# Patient Record
Sex: FEMALE | Race: BLACK OR AFRICAN AMERICAN | NOT HISPANIC OR LATINO | Employment: UNEMPLOYED | URBAN - METROPOLITAN AREA
[De-identification: names, ages, dates, MRNs, and addresses within clinical notes are randomized per-mention and may not be internally consistent; named-entity substitution may affect disease eponyms.]

---

## 2023-03-23 LAB
ALBUMIN (MG/L) IN URINE: 13.1 MG/L
ALBUMIN/CREATININE (UG/MG) IN URINE: 14.9 UG/MG CRT (ref 0–30)
CHOLESTEROL (MG/DL) IN SER/PLAS: 135 MG/DL (ref 0–199)
CHOLESTEROL IN HDL (MG/DL) IN SER/PLAS: 49.8 MG/DL
CHOLESTEROL/HDL RATIO: 2.7
CREATININE (MG/DL) IN URINE: 88.2 MG/DL (ref 20–320)
LDL: 61 MG/DL (ref 0–99)
TRIGLYCERIDE (MG/DL) IN SER/PLAS: 123 MG/DL (ref 0–149)
VLDL: 25 MG/DL (ref 0–40)

## 2023-04-11 ENCOUNTER — TELEPHONE (OUTPATIENT)
Dept: PRIMARY CARE | Facility: CLINIC | Age: 73
End: 2023-04-11
Payer: MEDICARE

## 2023-04-11 NOTE — TELEPHONE ENCOUNTER
Dr. Capps Pt. Called  to see if she can take a certain medication from her dermatologist  and for her heart monitor results. Please call Pt. Back at [phone].

## 2023-04-12 ENCOUNTER — TELEPHONE (OUTPATIENT)
Dept: PRIMARY CARE | Facility: CLINIC | Age: 73
End: 2023-04-12
Payer: MEDICARE

## 2023-04-12 NOTE — TELEPHONE ENCOUNTER
Miss Martinez would like to speak with you about something medical.    Thank you    At her Mobile Phone

## 2023-04-12 NOTE — TELEPHONE ENCOUNTER
Patient asked me about minoxidil but I warned patient about potential side effects if taking orally

## 2023-04-13 LAB
ALANINE AMINOTRANSFERASE (SGPT) (U/L) IN SER/PLAS: 18 U/L (ref 7–45)
ALBUMIN (G/DL) IN SER/PLAS: 4 G/DL (ref 3.4–5)
ALKALINE PHOSPHATASE (U/L) IN SER/PLAS: 63 U/L (ref 33–136)
ASPARTATE AMINOTRANSFERASE (SGOT) (U/L) IN SER/PLAS: 19 U/L (ref 9–39)
BILIRUBIN DIRECT (MG/DL) IN SER/PLAS: 0.1 MG/DL (ref 0–0.3)
BILIRUBIN TOTAL (MG/DL) IN SER/PLAS: 0.3 MG/DL (ref 0–1.2)
CHOLESTEROL (MG/DL) IN SER/PLAS: 132 MG/DL (ref 0–199)
CHOLESTEROL IN HDL (MG/DL) IN SER/PLAS: 54.1 MG/DL
CHOLESTEROL/HDL RATIO: 2.4
ESTIMATED AVERAGE GLUCOSE FOR HBA1C: 131 MG/DL
HEMOGLOBIN A1C/HEMOGLOBIN TOTAL IN BLOOD: 6.2 %
LDL: 59 MG/DL (ref 0–99)
PROTEIN TOTAL: 6.4 G/DL (ref 6.4–8.2)
TRIGLYCERIDE (MG/DL) IN SER/PLAS: 93 MG/DL (ref 0–149)
VLDL: 19 MG/DL (ref 0–40)

## 2023-04-17 NOTE — TELEPHONE ENCOUNTER
Dr. Capps Pt. Called inquiring do she still need to take the ECG recording because she already have a heart monitor. Pt. Would like for you to call her.

## 2023-04-20 ENCOUNTER — TELEPHONE (OUTPATIENT)
Dept: PRIMARY CARE | Facility: CLINIC | Age: 73
End: 2023-04-20
Payer: MEDICARE

## 2023-04-20 NOTE — TELEPHONE ENCOUNTER
I notified Pt.  Carolina Martinez of Dr. Capps message. Pt. expressed understanding of the message given

## 2023-04-20 NOTE — TELEPHONE ENCOUNTER
----- Message from Ashtyn Capps MD sent at 4/13/2023  9:10 PM EDT -----  Diabetes under good control

## 2023-06-17 DIAGNOSIS — I10 HYPERTENSION, UNSPECIFIED TYPE: ICD-10-CM

## 2023-06-19 RX ORDER — ROSUVASTATIN CALCIUM 20 MG/1
TABLET, COATED ORAL
Qty: 90 TABLET | Refills: 1 | Status: SHIPPED | OUTPATIENT
Start: 2023-06-19 | End: 2023-12-17

## 2023-06-19 RX ORDER — LOSARTAN POTASSIUM 25 MG/1
TABLET ORAL
Qty: 30 TABLET | Refills: 1 | Status: SHIPPED | OUTPATIENT
Start: 2023-06-19 | End: 2023-09-15

## 2023-06-27 ENCOUNTER — TELEPHONE (OUTPATIENT)
Dept: PRIMARY CARE | Facility: CLINIC | Age: 73
End: 2023-06-27
Payer: MEDICARE

## 2023-06-27 NOTE — TELEPHONE ENCOUNTER
Dr. Capps Pt. Called saying that she would like to speak with you to be referred to ENT ASAP having problems with throat call her at [phone].

## 2023-06-28 ENCOUNTER — OFFICE VISIT (OUTPATIENT)
Dept: PRIMARY CARE | Facility: CLINIC | Age: 73
End: 2023-06-28
Payer: MEDICARE

## 2023-06-28 VITALS
OXYGEN SATURATION: 97 % | RESPIRATION RATE: 19 BRPM | DIASTOLIC BLOOD PRESSURE: 76 MMHG | SYSTOLIC BLOOD PRESSURE: 115 MMHG | TEMPERATURE: 96.6 F | BODY MASS INDEX: 39.07 KG/M2 | HEART RATE: 85 BPM | WEIGHT: 199 LBS | HEIGHT: 60 IN

## 2023-06-28 DIAGNOSIS — J30.9 ALLERGIC RHINITIS, UNSPECIFIED SEASONALITY, UNSPECIFIED TRIGGER: ICD-10-CM

## 2023-06-28 DIAGNOSIS — K21.9 GASTROESOPHAGEAL REFLUX DISEASE, UNSPECIFIED WHETHER ESOPHAGITIS PRESENT: ICD-10-CM

## 2023-06-28 DIAGNOSIS — I10 HYPERTENSION, UNSPECIFIED TYPE: ICD-10-CM

## 2023-06-28 DIAGNOSIS — J02.9 SORE THROAT: Primary | ICD-10-CM

## 2023-06-28 DIAGNOSIS — E11.9 TYPE 2 DIABETES MELLITUS WITHOUT COMPLICATION, WITHOUT LONG-TERM CURRENT USE OF INSULIN (MULTI): ICD-10-CM

## 2023-06-28 DIAGNOSIS — E01.0 THYROMEGALY: ICD-10-CM

## 2023-06-28 LAB — POC RAPID STREP: NEGATIVE

## 2023-06-28 PROCEDURE — 1036F TOBACCO NON-USER: CPT | Performed by: PEDIATRICS

## 2023-06-28 PROCEDURE — 3074F SYST BP LT 130 MM HG: CPT | Performed by: PEDIATRICS

## 2023-06-28 PROCEDURE — 87880 STREP A ASSAY W/OPTIC: CPT | Performed by: PEDIATRICS

## 2023-06-28 PROCEDURE — 87635 SARS-COV-2 COVID-19 AMP PRB: CPT

## 2023-06-28 PROCEDURE — 87081 CULTURE SCREEN ONLY: CPT

## 2023-06-28 PROCEDURE — 3044F HG A1C LEVEL LT 7.0%: CPT | Performed by: PEDIATRICS

## 2023-06-28 PROCEDURE — 3078F DIAST BP <80 MM HG: CPT | Performed by: PEDIATRICS

## 2023-06-28 PROCEDURE — 99214 OFFICE O/P EST MOD 30 MIN: CPT | Performed by: PEDIATRICS

## 2023-06-28 PROCEDURE — 1159F MED LIST DOCD IN RCRD: CPT | Performed by: PEDIATRICS

## 2023-06-28 PROCEDURE — 4010F ACE/ARB THERAPY RXD/TAKEN: CPT | Performed by: PEDIATRICS

## 2023-06-28 RX ORDER — HYDROCHLOROTHIAZIDE 25 MG/1
1 TABLET ORAL DAILY
COMMUNITY
Start: 2021-04-07 | End: 2023-06-28 | Stop reason: SDUPTHER

## 2023-06-28 RX ORDER — POTASSIUM CHLORIDE 750 MG/1
TABLET, FILM COATED, EXTENDED RELEASE ORAL
COMMUNITY
Start: 2018-01-23 | End: 2023-06-28 | Stop reason: SDUPTHER

## 2023-06-28 RX ORDER — METOPROLOL TARTRATE 25 MG/1
1 TABLET, FILM COATED ORAL 2 TIMES DAILY
COMMUNITY
Start: 2014-09-23 | End: 2023-08-13

## 2023-06-28 RX ORDER — FLUTICASONE PROPIONATE 50 MCG
SPRAY, SUSPENSION (ML) NASAL
COMMUNITY
Start: 2023-01-12 | End: 2023-06-28 | Stop reason: SDUPTHER

## 2023-06-28 RX ORDER — TRIAMCINOLONE ACETONIDE 1 MG/G
OINTMENT TOPICAL
COMMUNITY
Start: 2023-06-06 | End: 2023-11-29 | Stop reason: WASHOUT

## 2023-06-28 RX ORDER — POTASSIUM CHLORIDE 750 MG/1
TABLET, FILM COATED, EXTENDED RELEASE ORAL
Qty: 90 TABLET | Refills: 1 | Status: SHIPPED | OUTPATIENT
Start: 2023-06-28

## 2023-06-28 RX ORDER — SEMAGLUTIDE 1.34 MG/ML
1 INJECTION, SOLUTION SUBCUTANEOUS WEEKLY
COMMUNITY
Start: 2022-12-02 | End: 2023-08-21 | Stop reason: ALTCHOICE

## 2023-06-28 RX ORDER — FLUTICASONE PROPIONATE 50 MCG
2 SPRAY, SUSPENSION (ML) NASAL DAILY
Qty: 16 G | Refills: 2 | Status: SHIPPED | OUTPATIENT
Start: 2023-06-28

## 2023-06-28 RX ORDER — BUPROPION HYDROCHLORIDE 150 MG/1
1 TABLET, EXTENDED RELEASE ORAL 2 TIMES DAILY
COMMUNITY
Start: 2023-01-12

## 2023-06-28 RX ORDER — OMEPRAZOLE 40 MG/1
40 CAPSULE, DELAYED RELEASE ORAL DAILY
Qty: 90 CAPSULE | Refills: 1 | Status: SHIPPED | OUTPATIENT
Start: 2023-06-28 | End: 2024-02-01

## 2023-06-28 RX ORDER — BLOOD SUGAR DIAGNOSTIC
STRIP MISCELLANEOUS
COMMUNITY
Start: 2021-01-04 | End: 2023-06-28 | Stop reason: SDUPTHER

## 2023-06-28 RX ORDER — ACETAMINOPHEN AND PHENYLEPHRINE HCL 325; 5 MG/1; MG/1
1 TABLET ORAL DAILY
COMMUNITY
Start: 2022-02-13 | End: 2024-04-23 | Stop reason: SDUPTHER

## 2023-06-28 RX ORDER — ALBUTEROL SULFATE 90 UG/1
2 AEROSOL, METERED RESPIRATORY (INHALATION)
COMMUNITY
Start: 2014-09-23 | End: 2023-11-14

## 2023-06-28 RX ORDER — SEMAGLUTIDE 2.68 MG/ML
2 INJECTION, SOLUTION SUBCUTANEOUS
COMMUNITY
Start: 2022-12-02

## 2023-06-28 RX ORDER — OMEPRAZOLE 40 MG/1
1 CAPSULE, DELAYED RELEASE ORAL DAILY
COMMUNITY
Start: 2022-07-11 | End: 2023-06-28 | Stop reason: SDUPTHER

## 2023-06-28 RX ORDER — LANCETS 33 GAUGE
EACH MISCELLANEOUS
COMMUNITY
Start: 2021-01-04

## 2023-06-28 RX ORDER — METFORMIN HYDROCHLORIDE 500 MG/1
1000 TABLET, EXTENDED RELEASE ORAL
COMMUNITY
Start: 2022-03-09 | End: 2023-11-15

## 2023-06-28 RX ORDER — NYSTATIN 100000 U/G
CREAM TOPICAL
COMMUNITY
Start: 2020-10-06 | End: 2023-08-21 | Stop reason: ALTCHOICE

## 2023-06-28 RX ORDER — HYDROCHLOROTHIAZIDE 25 MG/1
25 TABLET ORAL DAILY
Qty: 90 TABLET | Refills: 1 | Status: SHIPPED | OUTPATIENT
Start: 2023-06-28

## 2023-06-28 RX ORDER — TIRZEPATIDE 5 MG/.5ML
INJECTION, SOLUTION SUBCUTANEOUS
COMMUNITY
Start: 2023-05-24 | End: 2023-08-21 | Stop reason: ALTCHOICE

## 2023-06-28 RX ORDER — BLOOD SUGAR DIAGNOSTIC
STRIP MISCELLANEOUS
Qty: 100 STRIP | Refills: 2 | Status: SHIPPED | OUTPATIENT
Start: 2023-06-28

## 2023-06-28 RX ORDER — ASPIRIN 81 MG/1
1 TABLET ORAL DAILY
COMMUNITY
Start: 2021-01-04

## 2023-06-28 ASSESSMENT — PATIENT HEALTH QUESTIONNAIRE - PHQ9
SUM OF ALL RESPONSES TO PHQ9 QUESTIONS 1 AND 2: 0
2. FEELING DOWN, DEPRESSED OR HOPELESS: NOT AT ALL
1. LITTLE INTEREST OR PLEASURE IN DOING THINGS: NOT AT ALL

## 2023-06-28 ASSESSMENT — PAIN SCALES - GENERAL: PAINLEVEL: 8

## 2023-06-28 NOTE — PROGRESS NOTES
Subjective   Patient ID: Carolina Martinez is a 72 y.o. female who presents for Sore Throat and Earache.    HPI   Patient developed burning sore throat angela when laying down for 2 weeks; Has a feeling of lump in the throat which she can't get rid of; had bad cold and cough last week which improved. Has not tried mucinex; Clear phlegm.  Review of Systems    Objective   /76 (BP Location: Left arm, Patient Position: Sitting, BP Cuff Size: Large adult)   Pulse 85   Temp 35.9 °C (96.6 °F) (Temporal)   Resp 19   Ht 1.524 m (5')   Wt 90.3 kg (199 lb)   LMP  (LMP Unknown)   SpO2 97%   BMI 38.86 kg/m²     Physical Exam  Throat is red; Tms normal; thyroid seems to be a bit tender  Lungs clear  Heart reg    Assessment/Plan   Problem List Items Addressed This Visit       Sore throat - Primary    Relevant Orders    Sars-CoV-2 PCR, Symptomatic    POCT rapid strep A manually resulted    Group A Streptococcus, Culture    US thyroid    Allergic rhinitis    Relevant Medications    fluticasone (Flonase) 50 mcg/actuation nasal spray    GERD (gastroesophageal reflux disease)    Relevant Medications    omeprazole (PriLOSEC) 40 mg DR capsule    Thyromegaly    Relevant Orders    US thyroid     Other Visit Diagnoses       Hypertension, unspecified type        Relevant Medications    hydroCHLOROthiazide (HYDRODiuril) 25 mg tablet    potassium chloride CR 10 mEq ER tablet    Type 2 diabetes mellitus without complication, without long-term current use of insulin (CMS/[facility])        Relevant Medications    OneTouch Ultra Test strip

## 2023-06-28 NOTE — PATIENT INSTRUCTIONS
Take flonase, mucinex and for one week take omeprazole twice a day  Return in August as scheduled.

## 2023-06-29 LAB — SARS-COV-2 RESULT: NOT DETECTED

## 2023-06-30 PROBLEM — E11.9 DIABETES TYPE 2, CONTROLLED (MULTI): Status: ACTIVE | Noted: 2023-06-30

## 2023-06-30 PROBLEM — G47.33 OSA ON CPAP: Status: ACTIVE | Noted: 2023-06-30

## 2023-06-30 PROBLEM — R19.8 ABNORMAL FINDINGS ON ESOPHAGOGASTRODUODENOSCOPY (EGD): Status: ACTIVE | Noted: 2023-06-30

## 2023-06-30 PROBLEM — R91.8 MULTIPLE LUNG NODULES ON CT: Status: ACTIVE | Noted: 2023-06-30

## 2023-06-30 PROBLEM — G57.93 NEUROPATHIC PAIN OF BOTH FEET: Status: ACTIVE | Noted: 2023-06-30

## 2023-06-30 PROBLEM — R91.1 INCIDENTAL LUNG NODULE, > 3MM AND < 8MM: Status: ACTIVE | Noted: 2023-06-30

## 2023-06-30 PROBLEM — H91.90 HEARING LOSS: Status: ACTIVE | Noted: 2022-06-14

## 2023-06-30 PROBLEM — R22.42 MASS OF LEFT FOOT: Status: ACTIVE | Noted: 2023-06-30

## 2023-06-30 PROBLEM — M06.9 RHEUMATOID ARTHRITIS (MULTI): Status: ACTIVE | Noted: 2023-02-09

## 2023-06-30 PROBLEM — E55.9 VITAMIN D DEFICIENCY: Status: ACTIVE | Noted: 2023-06-30

## 2023-06-30 PROBLEM — G44.86 CERVICOGENIC HEADACHE: Status: ACTIVE | Noted: 2023-06-30

## 2023-06-30 PROBLEM — M54.50 CHRONIC LOWER BACK PAIN: Status: ACTIVE | Noted: 2023-06-30

## 2023-06-30 PROBLEM — R53.83 FATIGUE: Status: ACTIVE | Noted: 2023-06-30

## 2023-06-30 PROBLEM — R93.1 ELEVATED CORONARY ARTERY CALCIUM SCORE: Status: ACTIVE | Noted: 2023-06-30

## 2023-06-30 PROBLEM — G89.29 CHRONIC LOWER BACK PAIN: Status: ACTIVE | Noted: 2023-06-30

## 2023-06-30 PROBLEM — I73.9 INTERMITTENT CLAUDICATION (CMS-HCC): Status: ACTIVE | Noted: 2023-05-22

## 2023-06-30 PROBLEM — E78.5 HYPERLIPIDEMIA: Status: ACTIVE | Noted: 2023-06-30

## 2023-06-30 PROBLEM — M16.12 ARTHRITIS OF LEFT HIP: Status: ACTIVE | Noted: 2023-06-30

## 2023-06-30 PROBLEM — I10 HYPERTENSION: Status: ACTIVE | Noted: 2023-06-30

## 2023-06-30 PROBLEM — R80.9 MICROALBUMINURIA DUE TO TYPE 2 DIABETES MELLITUS (MULTI): Status: ACTIVE | Noted: 2023-06-30

## 2023-06-30 PROBLEM — E11.42 DIABETIC PERIPHERAL NEUROPATHY (MULTI): Status: ACTIVE | Noted: 2023-06-30

## 2023-06-30 PROBLEM — E83.52 HYPERCALCEMIA: Status: ACTIVE | Noted: 2023-06-30

## 2023-06-30 PROBLEM — E11.29 MICROALBUMINURIA DUE TO TYPE 2 DIABETES MELLITUS (MULTI): Status: ACTIVE | Noted: 2023-06-30

## 2023-06-30 PROBLEM — R49.0 CHRONIC HOARSENESS: Status: ACTIVE | Noted: 2022-05-10

## 2023-06-30 PROBLEM — F41.9 ANXIETY AND DEPRESSION: Status: ACTIVE | Noted: 2023-06-30

## 2023-06-30 PROBLEM — K58.1 IRRITABLE BOWEL SYNDROME WITH CONSTIPATION: Status: ACTIVE | Noted: 2023-06-30

## 2023-06-30 PROBLEM — G31.84 MILD COGNITIVE IMPAIRMENT: Status: ACTIVE | Noted: 2023-06-30

## 2023-06-30 PROBLEM — J44.9 COPD (CHRONIC OBSTRUCTIVE PULMONARY DISEASE) (MULTI): Status: ACTIVE | Noted: 2023-06-30

## 2023-06-30 PROBLEM — K76.0 FATTY LIVER: Status: ACTIVE | Noted: 2023-06-30

## 2023-06-30 PROBLEM — F32.A ANXIETY AND DEPRESSION: Status: ACTIVE | Noted: 2023-06-30

## 2023-06-30 PROBLEM — Z86.19 HISTORY OF HERPES GENITALIS: Status: ACTIVE | Noted: 2023-02-09

## 2023-06-30 LAB — GROUP A STREP SCREEN, CULTURE: NORMAL

## 2023-07-09 DIAGNOSIS — R59.0 ENLARGED LYMPH NODE IN NECK: ICD-10-CM

## 2023-07-09 DIAGNOSIS — E04.1 THYROID NODULE: Primary | ICD-10-CM

## 2023-07-18 ENCOUNTER — TELEPHONE (OUTPATIENT)
Dept: PRIMARY CARE | Facility: CLINIC | Age: 73
End: 2023-07-18
Payer: MEDICARE

## 2023-07-18 NOTE — TELEPHONE ENCOUNTER
I notified Pt. Carolina Martinez  of Dr. Capps message. Pt. expressed understanding of the message given. Pt. Said that the throat is fine it's the roof of her mouth and she did make  the  appointment with ENT which is scheduled for 08/16/23.

## 2023-07-18 NOTE — TELEPHONE ENCOUNTER
----- Message from Ashtyn Capps MD sent at 7/2/2023 10:50 PM EDT -----  The 2 day strep test was also negative; how is the sore throat?

## 2023-08-10 DIAGNOSIS — I10 HYPERTENSION, UNSPECIFIED TYPE: Primary | ICD-10-CM

## 2023-08-13 RX ORDER — METOPROLOL TARTRATE 25 MG/1
25 TABLET, FILM COATED ORAL 2 TIMES DAILY
Qty: 180 TABLET | Refills: 0 | Status: SHIPPED | OUTPATIENT
Start: 2023-08-13

## 2023-08-16 LAB
CREATININE (MG/DL) IN SER/PLAS: 0.84 MG/DL (ref 0.5–1.05)
GFR FEMALE: 74 ML/MIN/1.73M2
UREA NITROGEN (MG/DL) IN SER/PLAS: 13 MG/DL (ref 6–23)

## 2023-08-21 ENCOUNTER — OFFICE VISIT (OUTPATIENT)
Dept: PRIMARY CARE | Facility: CLINIC | Age: 73
End: 2023-08-21
Payer: MEDICARE

## 2023-08-21 VITALS
BODY MASS INDEX: 38.87 KG/M2 | RESPIRATION RATE: 17 BRPM | TEMPERATURE: 97.1 F | WEIGHT: 198 LBS | HEIGHT: 60 IN | DIASTOLIC BLOOD PRESSURE: 65 MMHG | SYSTOLIC BLOOD PRESSURE: 106 MMHG | OXYGEN SATURATION: 97 % | HEART RATE: 72 BPM

## 2023-08-21 DIAGNOSIS — G89.29 NECK PAIN, CHRONIC: ICD-10-CM

## 2023-08-21 DIAGNOSIS — F32.A ANXIETY AND DEPRESSION: ICD-10-CM

## 2023-08-21 DIAGNOSIS — F41.9 ANXIETY AND DEPRESSION: ICD-10-CM

## 2023-08-21 DIAGNOSIS — E04.1 THYROID NODULE: ICD-10-CM

## 2023-08-21 DIAGNOSIS — E11.9 CONTROLLED TYPE 2 DIABETES MELLITUS WITHOUT COMPLICATION, UNSPECIFIED WHETHER LONG TERM INSULIN USE (MULTI): ICD-10-CM

## 2023-08-21 DIAGNOSIS — K21.9 GASTROESOPHAGEAL REFLUX DISEASE, UNSPECIFIED WHETHER ESOPHAGITIS PRESENT: ICD-10-CM

## 2023-08-21 DIAGNOSIS — K76.0 FATTY LIVER: ICD-10-CM

## 2023-08-21 DIAGNOSIS — M06.9 RHEUMATOID ARTHRITIS, INVOLVING UNSPECIFIED SITE, UNSPECIFIED WHETHER RHEUMATOID FACTOR PRESENT (MULTI): ICD-10-CM

## 2023-08-21 DIAGNOSIS — G44.229 CHRONIC TENSION-TYPE HEADACHE, NOT INTRACTABLE: ICD-10-CM

## 2023-08-21 DIAGNOSIS — G44.86 CERVICOGENIC HEADACHE: ICD-10-CM

## 2023-08-21 DIAGNOSIS — J43.9 PULMONARY EMPHYSEMA, UNSPECIFIED EMPHYSEMA TYPE (MULTI): ICD-10-CM

## 2023-08-21 DIAGNOSIS — R06.09 DYSPNEA ON EXERTION: ICD-10-CM

## 2023-08-21 DIAGNOSIS — E66.01 CLASS 2 SEVERE OBESITY DUE TO EXCESS CALORIES WITH SERIOUS COMORBIDITY AND BODY MASS INDEX (BMI) OF 38.0 TO 38.9 IN ADULT (MULTI): ICD-10-CM

## 2023-08-21 DIAGNOSIS — G89.29 CHRONIC BILATERAL LOW BACK PAIN WITH LEFT-SIDED SCIATICA: ICD-10-CM

## 2023-08-21 DIAGNOSIS — E78.5 HYPERLIPIDEMIA, UNSPECIFIED HYPERLIPIDEMIA TYPE: Primary | ICD-10-CM

## 2023-08-21 DIAGNOSIS — M54.2 NECK PAIN, CHRONIC: ICD-10-CM

## 2023-08-21 DIAGNOSIS — M54.42 CHRONIC BILATERAL LOW BACK PAIN WITH LEFT-SIDED SCIATICA: ICD-10-CM

## 2023-08-21 DIAGNOSIS — R91.8 MULTIPLE LUNG NODULES ON CT: ICD-10-CM

## 2023-08-21 DIAGNOSIS — I73.9 INTERMITTENT CLAUDICATION (CMS-HCC): ICD-10-CM

## 2023-08-21 DIAGNOSIS — M16.12 ARTHRITIS OF LEFT HIP: ICD-10-CM

## 2023-08-21 DIAGNOSIS — E11.42 DIABETIC PERIPHERAL NEUROPATHY (MULTI): ICD-10-CM

## 2023-08-21 DIAGNOSIS — I10 HYPERTENSION, UNSPECIFIED TYPE: ICD-10-CM

## 2023-08-21 DIAGNOSIS — K59.00 CONSTIPATION, UNSPECIFIED CONSTIPATION TYPE: ICD-10-CM

## 2023-08-21 PROBLEM — G44.209 TENSION TYPE HEADACHE: Status: ACTIVE | Noted: 2023-08-21

## 2023-08-21 PROBLEM — E83.52 HYPERCALCEMIA: Status: RESOLVED | Noted: 2023-06-30 | Resolved: 2023-08-21

## 2023-08-21 PROBLEM — M25.552 LEFT HIP PAIN: Status: ACTIVE | Noted: 2023-08-21

## 2023-08-21 PROBLEM — R25.2 LEG CRAMPING: Status: ACTIVE | Noted: 2023-08-21

## 2023-08-21 PROBLEM — R94.2 DIFFUSION CAPACITY OF LUNG (DL), DECREASED: Status: ACTIVE | Noted: 2023-08-21

## 2023-08-21 PROBLEM — M54.50 LUMBAR PAIN: Status: ACTIVE | Noted: 2023-08-21

## 2023-08-21 PROBLEM — L65.9 HAIR THINNING: Status: ACTIVE | Noted: 2023-08-21

## 2023-08-21 PROBLEM — E66.813 OBESITY, CLASS III, BMI 40-49.9 (MORBID OBESITY): Status: ACTIVE | Noted: 2017-05-25

## 2023-08-21 PROBLEM — R22.1 MASS OF LEFT SIDE OF NECK: Status: ACTIVE | Noted: 2023-08-21

## 2023-08-21 PROBLEM — T14.8XXA LOCAL INFECTION OF WOUND: Status: ACTIVE | Noted: 2023-08-21

## 2023-08-21 PROBLEM — B37.9 MONILIA INFECTION: Status: ACTIVE | Noted: 2023-08-21

## 2023-08-21 PROBLEM — B37.9 MONILIA INFECTION: Status: RESOLVED | Noted: 2023-08-21 | Resolved: 2023-08-21

## 2023-08-21 PROBLEM — L08.9 LOCAL INFECTION OF WOUND: Status: ACTIVE | Noted: 2023-08-21

## 2023-08-21 PROBLEM — G47.30 SLEEP APNEA: Status: ACTIVE | Noted: 2023-08-21

## 2023-08-21 PROBLEM — R09.81 CONGESTION OF NASAL SINUS: Status: ACTIVE | Noted: 2023-08-21

## 2023-08-21 PROBLEM — J37.0 CHRONIC LARYNGITIS: Status: ACTIVE | Noted: 2022-05-10

## 2023-08-21 PROBLEM — U09.9 POST-COVID-19 CONDITION: Status: ACTIVE | Noted: 2023-08-21

## 2023-08-21 PROBLEM — M20.41 ACQUIRED HAMMERTOES OF BOTH FEET: Status: ACTIVE | Noted: 2023-08-21

## 2023-08-21 PROBLEM — R51.9 HEADACHE: Status: ACTIVE | Noted: 2023-08-21

## 2023-08-21 PROBLEM — M20.42 ACQUIRED HAMMERTOES OF BOTH FEET: Status: ACTIVE | Noted: 2023-08-21

## 2023-08-21 PROBLEM — J02.9 SORE THROAT: Status: RESOLVED | Noted: 2023-06-28 | Resolved: 2023-08-21

## 2023-08-21 PROBLEM — M79.609 LIMB PAIN: Status: ACTIVE | Noted: 2023-08-21

## 2023-08-21 PROBLEM — R63.5 WEIGHT GAIN: Status: ACTIVE | Noted: 2023-08-21

## 2023-08-21 PROBLEM — R42 DIZZINESS OF UNKNOWN CAUSE: Status: ACTIVE | Noted: 2023-08-21

## 2023-08-21 PROBLEM — R94.120 ABNORMAL OTOACOUSTIC EMISSIONS TEST: Status: ACTIVE | Noted: 2023-08-21

## 2023-08-21 PROBLEM — R49.0 CHRONIC HOARSENESS: Status: RESOLVED | Noted: 2022-05-10 | Resolved: 2023-08-21

## 2023-08-21 PROBLEM — R70.0 ELEVATED ERYTHROCYTE SEDIMENTATION RATE: Status: ACTIVE | Noted: 2023-08-21

## 2023-08-21 PROBLEM — U09.9 POST-COVID-19 CONDITION: Status: RESOLVED | Noted: 2023-08-21 | Resolved: 2023-08-21

## 2023-08-21 PROBLEM — E11.65 TYPE 2 DIABETES MELLITUS WITH HYPERGLYCEMIA (MULTI): Status: ACTIVE | Noted: 2023-08-21

## 2023-08-21 PROBLEM — T14.8XXA HEMATOMA: Status: ACTIVE | Noted: 2023-08-21

## 2023-08-21 PROBLEM — E66.812 CLASS 2 SEVERE OBESITY DUE TO EXCESS CALORIES WITH SERIOUS COMORBIDITY AND BODY MASS INDEX (BMI) OF 38.0 TO 38.9 IN ADULT: Status: ACTIVE | Noted: 2017-05-25

## 2023-08-21 PROCEDURE — 1125F AMNT PAIN NOTED PAIN PRSNT: CPT | Performed by: PEDIATRICS

## 2023-08-21 PROCEDURE — 3044F HG A1C LEVEL LT 7.0%: CPT | Performed by: PEDIATRICS

## 2023-08-21 PROCEDURE — 1159F MED LIST DOCD IN RCRD: CPT | Performed by: PEDIATRICS

## 2023-08-21 PROCEDURE — 99214 OFFICE O/P EST MOD 30 MIN: CPT | Performed by: PEDIATRICS

## 2023-08-21 PROCEDURE — 3074F SYST BP LT 130 MM HG: CPT | Performed by: PEDIATRICS

## 2023-08-21 PROCEDURE — 3078F DIAST BP <80 MM HG: CPT | Performed by: PEDIATRICS

## 2023-08-21 PROCEDURE — 3008F BODY MASS INDEX DOCD: CPT | Performed by: PEDIATRICS

## 2023-08-21 PROCEDURE — 4010F ACE/ARB THERAPY RXD/TAKEN: CPT | Performed by: PEDIATRICS

## 2023-08-21 PROCEDURE — 1036F TOBACCO NON-USER: CPT | Performed by: PEDIATRICS

## 2023-08-21 RX ORDER — TIZANIDINE 2 MG/1
2 TABLET ORAL EVERY 6 HOURS PRN
Qty: 30 TABLET | Refills: 0 | Status: SHIPPED | OUTPATIENT
Start: 2023-08-21 | End: 2024-04-23 | Stop reason: SDUPTHER

## 2023-08-21 RX ORDER — IBUPROFEN 100 MG/5ML
1000 SUSPENSION, ORAL (FINAL DOSE FORM) ORAL DAILY
COMMUNITY

## 2023-08-21 RX ORDER — LIDOCAINE 50 MG/G
OINTMENT TOPICAL
COMMUNITY

## 2023-08-21 ASSESSMENT — PAIN SCALES - GENERAL: PAINLEVEL: 10-WORST PAIN EVER

## 2023-08-21 NOTE — PROGRESS NOTES
Subjective   Patient ID: Carolina Martinez is a 72 y.o. female who presents for Diabetes, Hyperlipidemia, and Hypertension.    HPI  Chief complaint are her cervicogenic headaches  Colonoscopy due 2029; per pt was normal at [facility] in 2019.  Mammogram normal in April  Dexa normal in 2021  Eye doctor visit coming up in October  Had pneumonia and shingles vaccines  Cholesterol ok this year  Review of Systems    Objective   /65 (BP Location: Right arm, Patient Position: Sitting, BP Cuff Size: Large adult)   Pulse 72   Temp 36.2 °C (97.1 °F) (Temporal)   Resp 17   Ht 1.524 m (5')   Wt 89.8 kg (198 lb)   LMP  (LMP Unknown)   SpO2 97%   BMI 38.67 kg/m²     Physical Exam  Limited ROM of neck due to pain  Lungs clear  Heart reg  Abd soft NT  Sensation intact in feet  Assessment/Plan   Problem List Items Addressed This Visit       GERD (gastroesophageal reflux disease)    Current Assessment & Plan     Intermittent. Omeprazole helps         Anxiety and depression    Current Assessment & Plan     Wants to stick with 150 mg Wellbutrin         Arthritis of left hip    Current Assessment & Plan     Dr Lester gives cortisone shot every 3 months         Chronic lower back pain    Current Assessment & Plan     Mainly has low back pain and hip pain         Diabetes type 2, controlled (CMS/HCC)    Current Assessment & Plan     Takes sugar at home 100-110. Goal is between 80 and 120. Recent A1C 5.9         Diabetic peripheral neuropathy (CMS/HCC)    Current Assessment & Plan     Hands tingle and feet as well on left         Fatty liver    Relevant Orders    Fibroscan (Liver Elastography)    Cervicogenic headache    Current Assessment & Plan     Afraid of muscle relaxer because daughter committed suicide using them; pain is worse at night         Relevant Medications    tiZANidine (Zanaflex) 2 mg tablet    Other Relevant Orders    Referral to Physical Therapy    Hyperlipidemia - Primary    Current Assessment & Plan      Controlled with rosuvastatin         Hypertension    Current Assessment & Plan     Taking BP at home 110's over 70's         Multiple lung nodules on CT    Current Assessment & Plan     Followed by DR Jaimes and stable         Intermittent claudication (CMS/[facility])    Current Assessment & Plan     Has not had this for a while         Thyroid nodule    Current Assessment & Plan     Getting an US next week; followed by Dr Colby         Dyspnea on exertion    Current Assessment & Plan     Intermittent. Cannot walk fast. Does not have to stop         Class 2 severe obesity due to excess calories with serious comorbidity and body mass index (BMI) of 38.0 to 38.9 in adult (CMS/[facility])    Current Assessment & Plan     Trying Ozempic         Constipation    Current Assessment & Plan     Intermittent. Takes laxative sometimes.         Neck pain, chronic    Pulmonary emphysema (CMS/[facility])    Overview     Formatting of this note might be different from the original. Past h/o CHF, currently not a problem, per patient. Formatting of this note might be different from the original. Formatting of this note might be different from the original. Past h/o CHF, currently not a problem, per patient.         Current Assessment & Plan     Follows with Dr Jaimes         Tension type headache    Current Assessment & Plan     Takes Tylenol  headache daily; Also has headache associated with neck pain          Other Visit Diagnoses       Rheumatoid arthritis, involving unspecified site, unspecified whether rheumatoid factor present (CMS/[facility])             Dr Martinez saw her and did not think she had RA; dx removed

## 2023-08-21 NOTE — PATIENT INSTRUCTIONS
Low fat diet  Don't take Tylenol more than three times a week.  Take Tizanidine at bedtime  Moist heat to neck  Return in 3 or 4 months

## 2023-08-21 NOTE — PROGRESS NOTES
Subjective   Patient ID: Carolina Martinez is a 72 y.o. female who presents for Diabetes, Hyperlipidemia, and Hypertension.    HPI   Headache for a couple of weeks. Takes tylenol with little relief. Associated neck pain.  Mammogram done in 2023  Colonoscopy done in 2019  US of thyroid scheduled for next week.   Review of Systems  No CP  No SOB  Objective   /65 (BP Location: Right arm, Patient Position: Sitting, BP Cuff Size: Large adult)   Pulse 72   Temp 36.2 °C (97.1 °F) (Temporal)   Resp 17   Ht 1.524 m (5')   Wt 89.8 kg (198 lb)   LMP  (LMP Unknown)   SpO2 97%   BMI 38.67 kg/m²     Physical Exam    Assessment/Plan

## 2023-08-28 ENCOUNTER — TELEPHONE (OUTPATIENT)
Dept: PRIMARY CARE | Facility: CLINIC | Age: 73
End: 2023-08-28
Payer: MEDICARE

## 2023-08-28 NOTE — TELEPHONE ENCOUNTER
Dr. Capps Pt. left a voicemail message saying that you schedule her for therapy for her neck. Per Pt. physical therapy [facility] will not schedule her until you refer her to them. Pt. Contact number is [phone].

## 2023-09-14 ENCOUNTER — TELEPHONE (OUTPATIENT)
Dept: PHARMACY | Facility: HOSPITAL | Age: 73
End: 2023-09-14
Payer: MEDICARE

## 2023-09-14 DIAGNOSIS — I10 HYPERTENSION, UNSPECIFIED TYPE: ICD-10-CM

## 2023-09-14 NOTE — TELEPHONE ENCOUNTER
Population Health: Outreach by Ambulatory Pharmacy Team    Payor: [employer]  Reason: Adherence  Medication: Losartan 25mg  Outcome: Patient Reached: Will Refill, 9/14/23 will call [facility] to refill meds, saw PCP less than a month ago    KARUNA SMITH, PharmD

## 2023-09-15 RX ORDER — LOSARTAN POTASSIUM 25 MG/1
TABLET ORAL
Qty: 90 TABLET | Refills: 1 | Status: SHIPPED
Start: 2023-09-15 | End: 2023-11-29

## 2023-09-18 NOTE — TELEPHONE ENCOUNTER
I reviewed the progress note and agree with the resident’s findings and plans as written. Case discussed with resident.    Kale Gutiérrez, PharmD

## 2023-10-01 RX ORDER — TIRZEPATIDE 5 MG/.5ML
5 INJECTION, SOLUTION SUBCUTANEOUS
COMMUNITY
End: 2023-11-29 | Stop reason: WASHOUT

## 2023-10-01 RX ORDER — VITAMIN E MIXED 400 UNIT
1 CAPSULE ORAL DAILY
COMMUNITY
Start: 2022-02-13

## 2023-10-01 RX ORDER — LORATADINE 10 MG/1
10 TABLET ORAL DAILY
COMMUNITY

## 2023-10-04 ENCOUNTER — OFFICE VISIT (OUTPATIENT)
Dept: GASTROENTEROLOGY | Facility: CLINIC | Age: 73
End: 2023-10-04
Payer: MEDICARE

## 2023-10-04 VITALS — HEIGHT: 60 IN | BODY MASS INDEX: 37.03 KG/M2 | HEART RATE: 98 BPM | WEIGHT: 188.6 LBS

## 2023-10-04 DIAGNOSIS — K76.0 FATTY LIVER: Primary | ICD-10-CM

## 2023-10-04 DIAGNOSIS — K59.03 DRUG-INDUCED CONSTIPATION: ICD-10-CM

## 2023-10-04 PROCEDURE — 99214 OFFICE O/P EST MOD 30 MIN: CPT | Performed by: INTERNAL MEDICINE

## 2023-10-04 PROCEDURE — 1159F MED LIST DOCD IN RCRD: CPT | Performed by: INTERNAL MEDICINE

## 2023-10-04 PROCEDURE — 1036F TOBACCO NON-USER: CPT | Performed by: INTERNAL MEDICINE

## 2023-10-04 PROCEDURE — 3008F BODY MASS INDEX DOCD: CPT | Performed by: INTERNAL MEDICINE

## 2023-10-04 PROCEDURE — 3044F HG A1C LEVEL LT 7.0%: CPT | Performed by: INTERNAL MEDICINE

## 2023-10-04 PROCEDURE — 1125F AMNT PAIN NOTED PAIN PRSNT: CPT | Performed by: INTERNAL MEDICINE

## 2023-10-04 PROCEDURE — 4010F ACE/ARB THERAPY RXD/TAKEN: CPT | Performed by: INTERNAL MEDICINE

## 2023-10-04 ASSESSMENT — ENCOUNTER SYMPTOMS
EYE REDNESS: 0
ROS GI COMMENTS: AS PER HPI
FATIGUE: 0
DIFFICULTY URINATING: 0
FEVER: 0
CHILLS: 0
SORE THROAT: 0
HEADACHES: 0
MYALGIAS: 0
BRUISES/BLEEDS EASILY: 0
ARTHRALGIAS: 0
UNEXPECTED WEIGHT CHANGE: 0
NERVOUS/ANXIOUS: 0
ADENOPATHY: 0
SHORTNESS OF BREATH: 0

## 2023-10-04 NOTE — PROGRESS NOTES
Subjective     History of Present Illness:   Carolina Martinez is a 73 y.o. female with PMHx of obesity, EMERY, HTN, HLD, DM2, thyroid nodule, anxiety, depression, COPD who presents to GI clinic for evaluation of fatty liver.  She is concerned about the liver and pancreas  No history of jaundice, liver disease, or history of pancreatitis  Since taking Ozempic (started over a year ago) she has had problems with constipation, goes 3-4 days and has to take something to move her bowels  She takes senna+docusate as needed, sometimes has to take up to 5 pills to have a BM  Not on any daily bowel regimen  No blood in stools  Chart review:  Normal LFTs in April  EGD/Colonoscopy at [facility] in 2019 was normal, repeat in 10 years  Had normal GES in May 2021  Ultrasound RUQ in 2/2021 showed probably fatty liver, then had Fibroscan in 5/2021 that showed F1 (normal-mild fibrosis)    Review of Systems  Review of Systems   Constitutional:  Negative for chills, fatigue, fever and unexpected weight change.   HENT:  Negative for sore throat.    Eyes:  Negative for redness and visual disturbance.   Respiratory:  Negative for shortness of breath.    Cardiovascular:  Negative for chest pain.   Gastrointestinal:         As per HPI   Genitourinary:  Negative for difficulty urinating.   Musculoskeletal:  Negative for arthralgias and myalgias.   Skin:  Negative for rash.   Neurological:  Negative for headaches.   Hematological:  Negative for adenopathy. Does not bruise/bleed easily.   Psychiatric/Behavioral:  The patient is not nervous/anxious.    All other systems reviewed and are negative.      Past Medical History  As above.    Social History  she  reports that she has never smoked. She has never used smokeless tobacco. She reports current alcohol use of about 1.0 standard drink of alcohol per week. She reports that she does not use drugs.     Family History  her family history includes Coronary artery disease in her brother and  sister; Heart attack in her brother; Lung cancer in her sister; Suicide Attempts in her daughter.   No known FHX of colon cancer    Allergies  Allergies   Allergen Reactions    Codeine Hives, Itching and Rash    Ace Inhibitors Swelling and Other    Atorvastatin Other    Penicillin Unknown    Sulfa (Sulfonamide Antibiotics) Other and Unknown       Medications  Current Outpatient Medications   Medication Instructions    albuterol 90 mcg/actuation inhaler 2 puffs, inhalation, Before exercise, may repeat in 4hrs    ascorbic acid (VITAMIN C) 1,000 mg, oral, Daily    aspirin 81 mg EC tablet 1 tablet, oral, Daily    blood pressure test kit (BLOOD PRESSURE MONITOR MISC) USE AS DIRECTED  BP arm cuff with digital readout    buPROPion SR (Wellbutrin SR) 150 mg 12 hr tablet 1 tablet, oral, 2 times daily    cholecalciferol, vitamin D3, 125 mcg (5,000 unit) tablet,disintegrating 1 tablet, oral, Daily    fluticasone (Flonase) 50 mcg/actuation nasal spray 2 sprays, Each Nostril, Daily    hydroCHLOROthiazide (HYDRODIURIL) 25 mg, oral, Daily    lancets 33 gauge misc Use As Directed twice daily    lidocaine (Xylocaine) 5 % ointment     loratadine (CLARITIN) 10 mg, oral, Daily    losartan (Cozaar) 25 mg tablet TAKE 1 TABLET BY MOUNTH ONCE DAILY    metFORMIN XR (GLUCOPHAGE-XR) 1,000 mg, oral, Daily with evening meal    metoprolol tartrate (LOPRESSOR) 25 mg, oral, 2 times daily    Mounjaro 5 mg, subcutaneous, Weekly    omeprazole (PRILOSEC) 40 mg, oral, Daily    OneTouch Ultra Test strip USE 1 STRIP --test daily; type 2 diabetes    Ozempic 2 mg, subcutaneous, Weekly    potassium chloride CR 10 mEq ER tablet Take one a day    rosuvastatin (Crestor) 20 mg tablet TAKE ONE TABLET BY MOUTH ONCE DAILY    tiZANidine (ZANAFLEX) 2 mg, oral, Every 6 hours PRN    triamcinolone (Kenalog) 0.1 % ointment     vitamin E 90 mg (200 unit) capsule 1 tablet, oral, Daily    vitamin-B complex split tablet 1 tablet, oral, Daily        Objective   Visit  Vitals  Pulse 98      Physical Exam  Constitutional:       General: She is not in acute distress.  HENT:      Mouth/Throat:      Mouth: Mucous membranes are moist.   Eyes:      Extraocular Movements: Extraocular movements intact.   Cardiovascular:      Rate and Rhythm: Normal rate and regular rhythm.   Pulmonary:      Breath sounds: Normal breath sounds.   Abdominal:      General: Bowel sounds are normal. There is no distension.      Palpations: Abdomen is soft.      Tenderness: There is no abdominal tenderness. There is no guarding or rebound.   Musculoskeletal:         General: No swelling.   Skin:     General: Skin is warm and dry.   Neurological:      General: No focal deficit present.      Mental Status: She is alert.   Psychiatric:         Mood and Affect: Mood normal.         Behavior: Behavior normal.           Lab Results   Component Value Date    WBC 5.4 09/15/2022    WBC 5.4 12/30/2021    WBC 6.5 09/03/2021    HGB 13.4 09/15/2022    HGB 13.3 12/30/2021    HGB 12.7 09/03/2021    HCT 41.9 09/15/2022    HCT 42.8 12/30/2021    HCT 38.5 09/03/2021     09/15/2022     12/30/2021     09/03/2021     Lab Results   Component Value Date     09/15/2022     12/30/2021     09/03/2021    K 4.1 09/15/2022    K 4.6 12/30/2021    K 3.9 09/03/2021    CL 99 09/15/2022     12/30/2021     09/03/2021    CO2 31 09/15/2022    CO2 26 12/30/2021    CO2 32 09/03/2021    BUN 13 08/16/2023    BUN 18 09/15/2022    BUN 12 12/30/2021    CREATININE 0.84 08/16/2023    CREATININE 0.7 09/15/2022    CREATININE 0.72 12/30/2021    CALCIUM 10.2 09/15/2022    CALCIUM 9.5 12/30/2021    CALCIUM 10.1 09/03/2021    PROT 6.4 04/13/2023    PROT 7.1 09/15/2022    PROT 6.9 03/14/2022    BILITOT 0.3 04/13/2023    BILITOT 0.2 09/15/2022    BILITOT 0.4 12/30/2021    ALKPHOS 63 04/13/2023    ALKPHOS 71 09/15/2022    ALKPHOS 53 12/30/2021    ALT 18 04/13/2023    ALT 16 09/15/2022    ALT 22 12/30/2021    AST 19  04/13/2023    AST 19 09/15/2022    AST 24 12/30/2021    GLUCOSE 139 (H) 09/15/2022    GLUCOSE 123 (H) 12/30/2021    GLUCOSE 138 (H) 09/03/2021           Carolina Martinez is a 73 y.o. female who presents to GI clinic for evaluation of fatty liver, also having constipation as a side effect from Ozempic.    Fatty liver  Discussed that her LFTs are normal, and she had Fibroscan that did not show significant fibrosis.  Discussed that a small portion of patients can develop liver inflammation due to fatty infiltration and that this should be monitored, but currently there are no signs of liver damage.  - discussed lifestyle changes including exercise, weight loss, healthy diet, and managing diabetes and cholesterol as ways to improve fatty liver  - she can have regular lab work including LFTs as done by PCP  - no further GI testing needed at this time, can refer back if she develops LFT abnormalities    Constipation  Constipation likely due to side effect from Ozempic.  She is only taking medication PRN and so will go a few days between BMs then have to take large amounts of laxatives.  Discussed that she would do better with a daily regimen of a stool softener.  - recommend taking Colace (or Colace+Senna) daily to help prevent constipation  - she can also try fiber supplements if she would like     She is due for repeat colonoscopy in 2029.    She can follow up with me on a PRN basis.      Keith Tubbs MD

## 2023-10-04 NOTE — ASSESSMENT & PLAN NOTE
Constipation likely due to side effect from Ozempic.  She is only taking medication PRN and so will go a few days between BMs then have to take large amounts of laxatives.  Discussed that she would do better with a daily regimen of a stool softener.  - recommend taking Colace (or Colace+Senna) daily to help prevent constipation  - she can also try fiber supplements if she would like

## 2023-10-04 NOTE — ASSESSMENT & PLAN NOTE
Discussed that her LFTs are normal, and she had Fibroscan that did not show significant fibrosis.  Discussed that a small portion of patients can develop liver inflammation due to fatty infiltration and that this should be monitored, but currently there are no signs of liver damage.  - discussed lifestyle changes including exercise, weight loss, healthy diet, and managing diabetes and cholesterol as ways to improve fatty liver  - she can have regular lab work including LFTs as done by PCP  - no further GI testing needed at this time, can refer back if she develops LFT abnormalities

## 2023-10-04 NOTE — LETTER
October 4, 2023     Ashtyn Capps MD  [address]  [address]  [facility] [address]    Patient: Carolina Martinez   YOB: 1950   Date of Visit: 10/4/2023       Dear Dr. Ashtyn Capps MD:    Thank you for referring Carolina Martinez to me for evaluation. Below are my notes for this consultation.       Sincerely,     Keith Tubbs MD      CC: No Recipients  ______________________________________________________________________________________    Subjective    History of Present Illness:   Carolina Martinez is a 73 y.o. female with PMHx of obesity, EMERY, HTN, HLD, DM2, thyroid nodule, anxiety, depression, COPD who presents to GI clinic for evaluation of fatty liver.  She is concerned about the liver and pancreas  No history of jaundice, liver disease, or history of pancreatitis  Since taking Ozempic (started over a year ago) she has had problems with constipation, goes 3-4 days and has to take something to move her bowels  She takes senna+docusate as needed, sometimes has to take up to 5 pills to have a BM  Not on any daily bowel regimen  No blood in stools  Chart review:  Normal LFTs in April  EGD/Colonoscopy at [facility] in 2019 was normal, repeat in 10 years  Had normal GES in May 2021  Ultrasound RUQ in 2/2021 showed probably fatty liver, then had Fibroscan in 5/2021 that showed F1 (normal-mild fibrosis)    Review of Systems  Review of Systems   Constitutional:  Negative for chills, fatigue, fever and unexpected weight change.   HENT:  Negative for sore throat.    Eyes:  Negative for redness and visual disturbance.   Respiratory:  Negative for shortness of breath.    Cardiovascular:  Negative for chest pain.   Gastrointestinal:         As per HPI   Genitourinary:  Negative for difficulty urinating.   Musculoskeletal:  Negative for arthralgias and myalgias.   Skin:  Negative for rash.   Neurological:  Negative for headaches.   Hematological:  Negative for adenopathy. Does not  bruise/bleed easily.   Psychiatric/Behavioral:  The patient is not nervous/anxious.    All other systems reviewed and are negative.      Past Medical History  As above.    Social History  she  reports that she has never smoked. She has never used smokeless tobacco. She reports current alcohol use of about 1.0 standard drink of alcohol per week. She reports that she does not use drugs.     Family History  her family history includes Coronary artery disease in her brother and sister; Heart attack in her brother; Lung cancer in her sister; Suicide Attempts in her daughter.   No known FHX of colon cancer    Allergies  Allergies   Allergen Reactions   • Codeine Hives, Itching and Rash   • Ace Inhibitors Swelling and Other   • Atorvastatin Other   • Penicillin Unknown   • Sulfa (Sulfonamide Antibiotics) Other and Unknown       Medications  Current Outpatient Medications   Medication Instructions   • albuterol 90 mcg/actuation inhaler 2 puffs, inhalation, Before exercise, may repeat in 4hrs   • ascorbic acid (VITAMIN C) 1,000 mg, oral, Daily   • aspirin 81 mg EC tablet 1 tablet, oral, Daily   • blood pressure test kit (BLOOD PRESSURE MONITOR MISC) USE AS DIRECTED  BP arm cuff with digital readout   • buPROPion SR (Wellbutrin SR) 150 mg 12 hr tablet 1 tablet, oral, 2 times daily   • cholecalciferol, vitamin D3, 125 mcg (5,000 unit) tablet,disintegrating 1 tablet, oral, Daily   • fluticasone (Flonase) 50 mcg/actuation nasal spray 2 sprays, Each Nostril, Daily   • hydroCHLOROthiazide (HYDRODIURIL) 25 mg, oral, Daily   • lancets 33 gauge misc Use As Directed twice daily   • lidocaine (Xylocaine) 5 % ointment    • loratadine (CLARITIN) 10 mg, oral, Daily   • losartan (Cozaar) 25 mg tablet TAKE 1 TABLET BY [facility] ONCE DAILY   • metFORMIN XR (GLUCOPHAGE-XR) 1,000 mg, oral, Daily with evening meal   • metoprolol tartrate (LOPRESSOR) 25 mg, oral, 2 times daily   • Mounjaro 5 mg, subcutaneous, Weekly   • omeprazole (PRILOSEC) 40 mg,  oral, Daily   • OneTouch Ultra Test strip USE 1 STRIP --test daily; type 2 diabetes   • Ozempic 2 mg, subcutaneous, Weekly   • potassium chloride CR 10 mEq ER tablet Take one a day   • rosuvastatin (Crestor) 20 mg tablet TAKE ONE TABLET BY MOUTH ONCE DAILY   • tiZANidine (ZANAFLEX) 2 mg, oral, Every 6 hours PRN   • triamcinolone (Kenalog) 0.1 % ointment    • vitamin E 90 mg (200 unit) capsule 1 tablet, oral, Daily   • vitamin-B complex split tablet 1 tablet, oral, Daily        Objective  Visit Vitals  Pulse 98      Physical Exam  Constitutional:       General: She is not in acute distress.  HENT:      Mouth/Throat:      Mouth: Mucous membranes are moist.   Eyes:      Extraocular Movements: Extraocular movements intact.   Cardiovascular:      Rate and Rhythm: Normal rate and regular rhythm.   Pulmonary:      Breath sounds: Normal breath sounds.   Abdominal:      General: Bowel sounds are normal. There is no distension.      Palpations: Abdomen is soft.      Tenderness: There is no abdominal tenderness. There is no guarding or rebound.   Musculoskeletal:         General: No swelling.   Skin:     General: Skin is warm and dry.   Neurological:      General: No focal deficit present.      Mental Status: She is alert.   Psychiatric:         Mood and Affect: Mood normal.         Behavior: Behavior normal.           Lab Results   Component Value Date    WBC 5.4 09/15/2022    WBC 5.4 12/30/2021    WBC 6.5 09/03/2021    HGB 13.4 09/15/2022    HGB 13.3 12/30/2021    HGB 12.7 09/03/2021    HCT 41.9 09/15/2022    HCT 42.8 12/30/2021    HCT 38.5 09/03/2021     09/15/2022     12/30/2021     09/03/2021     Lab Results   Component Value Date     09/15/2022     12/30/2021     09/03/2021    K 4.1 09/15/2022    K 4.6 12/30/2021    K 3.9 09/03/2021    CL 99 09/15/2022     12/30/2021     09/03/2021    CO2 31 09/15/2022    CO2 26 12/30/2021    CO2 32 09/03/2021    BUN 13 08/16/2023    BUN  18 09/15/2022    BUN 12 12/30/2021    CREATININE 0.84 08/16/2023    CREATININE 0.7 09/15/2022    CREATININE 0.72 12/30/2021    CALCIUM 10.2 09/15/2022    CALCIUM 9.5 12/30/2021    CALCIUM 10.1 09/03/2021    PROT 6.4 04/13/2023    PROT 7.1 09/15/2022    PROT 6.9 03/14/2022    BILITOT 0.3 04/13/2023    BILITOT 0.2 09/15/2022    BILITOT 0.4 12/30/2021    ALKPHOS 63 04/13/2023    ALKPHOS 71 09/15/2022    ALKPHOS 53 12/30/2021    ALT 18 04/13/2023    ALT 16 09/15/2022    ALT 22 12/30/2021    AST 19 04/13/2023    AST 19 09/15/2022    AST 24 12/30/2021    GLUCOSE 139 (H) 09/15/2022    GLUCOSE 123 (H) 12/30/2021    GLUCOSE 138 (H) 09/03/2021           Carolina Martinez is a 73 y.o. female who presents to GI clinic for evaluation of fatty liver, also having constipation as a side effect from Ozempic.    Fatty liver  Discussed that her LFTs are normal, and she had Fibroscan that did not show significant fibrosis.  Discussed that a small portion of patients can develop liver inflammation due to fatty infiltration and that this should be monitored, but currently there are no signs of liver damage.  - discussed lifestyle changes including exercise, weight loss, healthy diet, and managing diabetes and cholesterol as ways to improve fatty liver  - she can have regular lab work including LFTs as done by PCP  - no further GI testing needed at this time, can refer back if she develops LFT abnormalities    Constipation  Constipation likely due to side effect from Ozempic.  She is only taking medication PRN and so will go a few days between BMs then have to take large amounts of laxatives.  Discussed that she would do better with a daily regimen of a stool softener.  - recommend taking Colace (or Colace+Senna) daily to help prevent constipation  - she can also try fiber supplements if she would like     She is due for repeat colonoscopy in 2029.    She can follow up with me on a PRN basis.      Keith Tubbs MD

## 2023-10-04 NOTE — PATIENT INSTRUCTIONS
For fatty liver, we recommend staying active, continuing to lose weight, and continuing good blood sugar and cholesterol control.    For constipation, you can take a daily stool softener such as Colace (docusate).  Take something daily to help prevent constipation.  You could also try MiraLAX or even psyllium (Benefiber, Metamucil) if needed.    You can change to Pepcid (famotidine) and use this as needed instead of omeprazole for heartburn.

## 2023-10-24 NOTE — PROGRESS NOTES
"Provider Impressions     Right abnormal level 2 node that I cannot appreciate clinically. I also cannot appreciate any worrisome lesions in the head and neck. She has had this discomfort on the left side of her neck and face and ear since COVID. I certainly cannot explain that. After discussion we agreed to get a CT scan of her neck.     I will see her after the CT scan.      Chief Complaint     Consultation for the work-up of a right-sided neck mass      History of Present IllnessThis patient is seen at the request of her family physician. Over the past 3 years she has had some discomfort on the left side of her neck and ear and face. That seems to have been caused by COVID. It really has not changed much over that period of time. The patient had an ultrasound of her thyroid that was done back in July 2023 that showed a \"abnormal\" level 2 node on the right side. The patient has no symptoms related to that side of her throat.        Physical Exam  The patient is alert and oriented. Examination of the external ears, ear canals, and eardrums, is within normal limits. Examination of the anterior and external nose is negative. Examination of the oral cavity and oropharynx is normal. There is no evidence of any mucosal lesions. There is good mobility of the tongue and palate. There is good mandibular excursion. Palpation of the parotid, neck, and thyroid field fails to show any worrisome masses or adenopathies. More specifically I cannot feel this abnormal node at the right level 2.     A flexible laryngoscopy was carried out. Under topical Xylocaine and Clifton-Synephrine the scope was introduced through the nostril. The nasopharynx, base of tongue, hypopharynx, and larynx are visualized. The vocal cords are normally mobile. There is no pooling of secretions in the piriform sinuses. There is no evidence of any mucosal lesions.     "

## 2023-10-25 ENCOUNTER — APPOINTMENT (OUTPATIENT)
Dept: OTOLARYNGOLOGY | Facility: CLINIC | Age: 73
End: 2023-10-25
Payer: MEDICARE

## 2023-11-14 ENCOUNTER — TELEPHONE (OUTPATIENT)
Dept: PRIMARY CARE | Facility: CLINIC | Age: 73
End: 2023-11-14

## 2023-11-14 ENCOUNTER — APPOINTMENT (OUTPATIENT)
Dept: RADIOLOGY | Facility: HOSPITAL | Age: 73
End: 2023-11-14
Payer: MEDICARE

## 2023-11-14 ENCOUNTER — HOSPITAL ENCOUNTER (EMERGENCY)
Facility: HOSPITAL | Age: 73
Discharge: HOME | End: 2023-11-14
Attending: EMERGENCY MEDICINE
Payer: MEDICARE

## 2023-11-14 VITALS
SYSTOLIC BLOOD PRESSURE: 137 MMHG | OXYGEN SATURATION: 96 % | HEART RATE: 80 BPM | DIASTOLIC BLOOD PRESSURE: 79 MMHG | HEIGHT: 60 IN | TEMPERATURE: 98.1 F | RESPIRATION RATE: 16 BRPM | BODY MASS INDEX: 36.12 KG/M2 | WEIGHT: 184 LBS

## 2023-11-14 DIAGNOSIS — J44.1 COPD EXACERBATION (MULTI): Primary | ICD-10-CM

## 2023-11-14 LAB
ALBUMIN SERPL BCP-MCNC: 4.3 G/DL (ref 3.4–5)
ALP SERPL-CCNC: 61 U/L (ref 33–136)
ALT SERPL W P-5'-P-CCNC: 17 U/L (ref 7–45)
ANION GAP SERPL CALC-SCNC: 12 MMOL/L (ref 10–20)
AST SERPL W P-5'-P-CCNC: 21 U/L (ref 9–39)
BILIRUB SERPL-MCNC: 0.5 MG/DL (ref 0–1.2)
BNP SERPL-MCNC: 10 PG/ML (ref 0–99)
BUN SERPL-MCNC: 13 MG/DL (ref 6–23)
CALCIUM SERPL-MCNC: 9.8 MG/DL (ref 8.6–10.3)
CARDIAC TROPONIN I PNL SERPL HS: 4 NG/L (ref 0–13)
CARDIAC TROPONIN I PNL SERPL HS: 4 NG/L (ref 0–13)
CHLORIDE SERPL-SCNC: 99 MMOL/L (ref 98–107)
CO2 SERPL-SCNC: 33 MMOL/L (ref 21–32)
CREAT SERPL-MCNC: 0.75 MG/DL (ref 0.5–1.05)
ERYTHROCYTE [DISTWIDTH] IN BLOOD BY AUTOMATED COUNT: 15.9 % (ref 11.5–14.5)
FLUAV RNA RESP QL NAA+PROBE: NOT DETECTED
FLUBV RNA RESP QL NAA+PROBE: NOT DETECTED
GFR SERPL CREATININE-BSD FRML MDRD: 85 ML/MIN/1.73M*2
GLUCOSE SERPL-MCNC: 93 MG/DL (ref 74–99)
HCT VFR BLD AUTO: 40.9 % (ref 36–46)
HGB BLD-MCNC: 13.2 G/DL (ref 12–16)
MCH RBC QN AUTO: 28.1 PG (ref 26–34)
MCHC RBC AUTO-ENTMCNC: 32.3 G/DL (ref 32–36)
MCV RBC AUTO: 87 FL (ref 80–100)
NRBC BLD-RTO: 0 /100 WBCS (ref 0–0)
PLATELET # BLD AUTO: 271 X10*3/UL (ref 150–450)
POTASSIUM SERPL-SCNC: 3.9 MMOL/L (ref 3.5–5.3)
PROT SERPL-MCNC: 6.8 G/DL (ref 6.4–8.2)
RBC # BLD AUTO: 4.7 X10*6/UL (ref 4–5.2)
SARS-COV-2 RNA RESP QL NAA+PROBE: NOT DETECTED
SODIUM SERPL-SCNC: 140 MMOL/L (ref 136–145)
WBC # BLD AUTO: 5.4 X10*3/UL (ref 4.4–11.3)

## 2023-11-14 PROCEDURE — 71045 X-RAY EXAM CHEST 1 VIEW: CPT

## 2023-11-14 PROCEDURE — 2500000004 HC RX 250 GENERAL PHARMACY W/ HCPCS (ALT 636 FOR OP/ED): Performed by: EMERGENCY MEDICINE

## 2023-11-14 PROCEDURE — 94640 AIRWAY INHALATION TREATMENT: CPT

## 2023-11-14 PROCEDURE — 84484 ASSAY OF TROPONIN QUANT: CPT | Performed by: EMERGENCY MEDICINE

## 2023-11-14 PROCEDURE — 99285 EMERGENCY DEPT VISIT HI MDM: CPT | Mod: 25 | Performed by: EMERGENCY MEDICINE

## 2023-11-14 PROCEDURE — 85027 COMPLETE CBC AUTOMATED: CPT | Performed by: EMERGENCY MEDICINE

## 2023-11-14 PROCEDURE — 71045 X-RAY EXAM CHEST 1 VIEW: CPT | Performed by: RADIOLOGY

## 2023-11-14 PROCEDURE — 87636 SARSCOV2 & INF A&B AMP PRB: CPT | Performed by: EMERGENCY MEDICINE

## 2023-11-14 PROCEDURE — 83880 ASSAY OF NATRIURETIC PEPTIDE: CPT | Performed by: EMERGENCY MEDICINE

## 2023-11-14 PROCEDURE — 36415 COLL VENOUS BLD VENIPUNCTURE: CPT | Performed by: EMERGENCY MEDICINE

## 2023-11-14 PROCEDURE — 80053 COMPREHEN METABOLIC PANEL: CPT | Performed by: EMERGENCY MEDICINE

## 2023-11-14 PROCEDURE — 2500000002 HC RX 250 W HCPCS SELF ADMINISTERED DRUGS (ALT 637 FOR MEDICARE OP, ALT 636 FOR OP/ED): Performed by: EMERGENCY MEDICINE

## 2023-11-14 PROCEDURE — 99283 EMERGENCY DEPT VISIT LOW MDM: CPT | Mod: 25

## 2023-11-14 RX ORDER — PREDNISONE 20 MG/1
20 TABLET ORAL DAILY
Qty: 5 TABLET | Refills: 0 | Status: SHIPPED | OUTPATIENT
Start: 2023-11-14 | End: 2023-11-19

## 2023-11-14 RX ORDER — ALBUTEROL SULFATE 0.83 MG/ML
2.5 SOLUTION RESPIRATORY (INHALATION)
Status: DISPENSED | OUTPATIENT
Start: 2023-11-14 | End: 2023-11-14

## 2023-11-14 RX ORDER — PREDNISONE 20 MG/1
20 TABLET ORAL ONCE
Status: COMPLETED | OUTPATIENT
Start: 2023-11-14 | End: 2023-11-14

## 2023-11-14 RX ORDER — ALBUTEROL SULFATE 90 UG/1
2 AEROSOL, METERED RESPIRATORY (INHALATION) EVERY 4 HOURS PRN
Qty: 18 G | Refills: 0 | Status: SHIPPED | OUTPATIENT
Start: 2023-11-14 | End: 2023-12-14

## 2023-11-14 RX ADMIN — ALBUTEROL SULFATE 2.5 MG: 2.5 SOLUTION RESPIRATORY (INHALATION) at 09:36

## 2023-11-14 RX ADMIN — PREDNISONE 20 MG: 20 TABLET ORAL at 09:36

## 2023-11-14 ASSESSMENT — PAIN DESCRIPTION - DESCRIPTORS
DESCRIPTORS: TIGHTNESS
DESCRIPTORS: ACHING

## 2023-11-14 ASSESSMENT — PAIN SCALES - GENERAL: PAINLEVEL_OUTOF10: 5 - MODERATE PAIN

## 2023-11-14 ASSESSMENT — PAIN - FUNCTIONAL ASSESSMENT: PAIN_FUNCTIONAL_ASSESSMENT: 0-10

## 2023-11-14 ASSESSMENT — PAIN DESCRIPTION - LOCATION: LOCATION: CHEST

## 2023-11-14 ASSESSMENT — COLUMBIA-SUICIDE SEVERITY RATING SCALE - C-SSRS
1. IN THE PAST MONTH, HAVE YOU WISHED YOU WERE DEAD OR WISHED YOU COULD GO TO SLEEP AND NOT WAKE UP?: NO
2. HAVE YOU ACTUALLY HAD ANY THOUGHTS OF KILLING YOURSELF?: NO
6. HAVE YOU EVER DONE ANYTHING, STARTED TO DO ANYTHING, OR PREPARED TO DO ANYTHING TO END YOUR LIFE?: NO

## 2023-11-14 ASSESSMENT — PAIN DESCRIPTION - ORIENTATION: ORIENTATION: MID

## 2023-11-14 NOTE — ED TRIAGE NOTES
Pt presents with the c/o mid sternal chest pressure that she feels into her throat. Pt states that pain started last night. Pt denies SOB, denies dizziness.

## 2023-11-14 NOTE — ED PROVIDER NOTES
HPI   Chief Complaint   Patient presents with    Chest Pain       This is a 73-year-old woman with past medical history of diabetes, hyperlipidemia, hypertension, COPD who does present with complaint of worsening cough and shortness of breath x1 to 2 days.  Positive worsening wheezing.  Did attempt albuterol inhaler minimal improvement.  No fever or chills.  No vomiting or diarrhea.  No dysuria hematuria flank pain.                        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    Other specified soft tissue disorders 09/15/2021    Calf swelling    Personal history of diseases of the blood and blood-forming organs and certain disorders involving the immune mechanism 11/05/2021    History of anemia    Personal history of other diseases of the circulatory system 09/30/2021    History of intermittent claudication    Personal history of other diseases of the respiratory system 09/07/2022    History of chronic obstructive lung disease    Personal history of other specified conditions 08/01/2022    History of dizziness    Shortness of breath 09/01/2021    SOB (shortness of breath) on exertion     History reviewed. No pertinent surgical history.  Family History   Problem Relation Name Age of Onset    Coronary artery disease Sister          Stent patent    Lung cancer Sister      Coronary artery disease Brother      Heart attack Brother      Suicide Attempts Daughter       Social History     Tobacco Use    Smoking status: Never    Smokeless tobacco: Never   Substance Use Topics    Alcohol use: Yes     Alcohol/week: 1.0 standard drink of alcohol     Types: 1 Glasses of wine per week    Drug use: Never       Physical Exam   ED Triage Vitals   Temp Heart Rate Resp BP   11/14/23 0800 11/14/23 0800 11/14/23 0800 11/14/23 0800   36.7 °C (98.1 °F) 87 20 (!) 136/100      SpO2 Temp Source Heart Rate Source Patient Position   11/14/23  0800 11/14/23 0800 11/14/23 0940 11/14/23 0940   100 % Temporal Monitor Sitting      BP Location FiO2 (%)     -- --             Physical Exam  Vitals and nursing note reviewed.   Constitutional:       Appearance: She is well-developed and normal weight.   HENT:      Head: Atraumatic.   Eyes:      Extraocular Movements: Extraocular movements intact.      Pupils: Pupils are equal, round, and reactive to light.   Cardiovascular:      Rate and Rhythm: Normal rate.      Heart sounds: Normal heart sounds.   Pulmonary:      Effort: Tachypnea present.      Breath sounds: No stridor. Examination of the right-upper field reveals wheezing. Examination of the left-upper field reveals wheezing. Examination of the right-middle field reveals wheezing. Examination of the left-middle field reveals wheezing. Wheezing present. No rhonchi or rales.   Abdominal:      General: Bowel sounds are normal.      Palpations: Abdomen is soft.   Musculoskeletal:         General: Normal range of motion.      Cervical back: Normal range of motion and neck supple.   Skin:     General: Skin is warm and dry.      Capillary Refill: Capillary refill takes less than 2 seconds.   Neurological:      General: No focal deficit present.      Mental Status: She is alert and oriented to person, place, and time.   Psychiatric:         Mood and Affect: Mood normal.         Behavior: Behavior normal.         ED Course & MDM   Diagnoses as of 11/14/23 1628   COPD exacerbation (CMS/[facility])       Medical Decision Making  EKG interpreted by me showsNormal sinus rhythm at a rate of 81 with no acute ischemic changes  IV is placed and labs drawn including CBC, CMP, troponin.  Troponin was negative for ACS.  COVID and flu were negative.  No elevated blood cell count or stomach infection.  No acute kidney injury.  No metabolic [place] acidosis.  BNP negative for CHF.  Chest x-ray was negative for acute pathology.  She received albuterol 3 rounds as well as prednisone here in  the ED with resolution of symptoms.  She is feeling improved.  Patient safely discharged home with plan to continue albuterol, prednisone and to follow-up with her outpatient provider.  Return precautions were discussed.        Procedure  Procedures     Samy Alatorre MD  11/14/23 [id number]       Samy Alatorre MD  11/14/23 2124

## 2023-11-14 NOTE — DISCHARGE INSTRUCTIONS
Please use your albuterol every 4 hours needed for wheezing for the next 3 to 5 days.  Please take the prednisone as prescribed to treat for your COPD exacerbation.  Please follow-up with your primary care physician next 1 to 2 days repeat exam to ensure improvement in symptoms or please return to ED for worsening chest pain, palpitation or shortness of breath or any other concerning symptoms.

## 2023-11-15 DIAGNOSIS — E11.9 CONTROLLED TYPE 2 DIABETES MELLITUS WITHOUT COMPLICATION, UNSPECIFIED WHETHER LONG TERM INSULIN USE (MULTI): Primary | ICD-10-CM

## 2023-11-15 RX ORDER — METFORMIN HYDROCHLORIDE 500 MG/1
TABLET, EXTENDED RELEASE ORAL
Qty: 180 TABLET | Refills: 1 | Status: SHIPPED
Start: 2023-11-15 | End: 2024-04-23

## 2023-11-16 ENCOUNTER — OFFICE VISIT (OUTPATIENT)
Dept: NEUROLOGY | Facility: CLINIC | Age: 73
End: 2023-11-16
Payer: MEDICARE

## 2023-11-16 VITALS
HEART RATE: 87 BPM | BODY MASS INDEX: 36.71 KG/M2 | SYSTOLIC BLOOD PRESSURE: 121 MMHG | DIASTOLIC BLOOD PRESSURE: 74 MMHG | WEIGHT: 187 LBS | HEIGHT: 60 IN

## 2023-11-16 DIAGNOSIS — R51.9 NONINTRACTABLE EPISODIC HEADACHE, UNSPECIFIED HEADACHE TYPE: ICD-10-CM

## 2023-11-16 DIAGNOSIS — M25.519 NECK AND SHOULDER PAIN: Primary | ICD-10-CM

## 2023-11-16 DIAGNOSIS — M54.2 NECK AND SHOULDER PAIN: Primary | ICD-10-CM

## 2023-11-16 PROCEDURE — 3078F DIAST BP <80 MM HG: CPT | Performed by: PSYCHIATRY & NEUROLOGY

## 2023-11-16 PROCEDURE — 3008F BODY MASS INDEX DOCD: CPT | Performed by: PSYCHIATRY & NEUROLOGY

## 2023-11-16 PROCEDURE — 4010F ACE/ARB THERAPY RXD/TAKEN: CPT | Performed by: PSYCHIATRY & NEUROLOGY

## 2023-11-16 PROCEDURE — 3074F SYST BP LT 130 MM HG: CPT | Performed by: PSYCHIATRY & NEUROLOGY

## 2023-11-16 PROCEDURE — 1036F TOBACCO NON-USER: CPT | Performed by: PSYCHIATRY & NEUROLOGY

## 2023-11-16 PROCEDURE — 1125F AMNT PAIN NOTED PAIN PRSNT: CPT | Performed by: PSYCHIATRY & NEUROLOGY

## 2023-11-16 PROCEDURE — 99214 OFFICE O/P EST MOD 30 MIN: CPT | Performed by: PSYCHIATRY & NEUROLOGY

## 2023-11-16 PROCEDURE — 1159F MED LIST DOCD IN RCRD: CPT | Performed by: PSYCHIATRY & NEUROLOGY

## 2023-11-16 PROCEDURE — 3044F HG A1C LEVEL LT 7.0%: CPT | Performed by: PSYCHIATRY & NEUROLOGY

## 2023-11-16 NOTE — PROGRESS NOTES
Subjective     Carolina Martinez is a 73 y.o. year old female seen in follow-up for headache, neck pain, suspected small fiber neuropathy in context of diabetes.    HPI    73-year-old -American woman with past medical history significant for type 2 diabetes, hypertension, hyperlipidemia, COPD, fatty liver, former smoking.     I evaluated her initially on 3/14/2022. As detailed in my ambulatory EMR note from that date, she has been bothered by left hemicranial headaches associated with left lateral cervical pain radiating into the shoulder. She has undergone imaging studies and lab evaluations as detailed in my initial note. She additionally complained of pain in both feet without notable findings on exam to suggest a component of large fiber neuropathy. I have suspected small fiber neuropathy related to diabetes but I did send her for labs including SIEP and cryoglobulins which were normal.     I recommended lidocaine 5% ointment applied to the feet twice a day, and baclofen 5 mg 3 times daily for headaches and neck pain.     I evaluated her again on 7/20/2022, by A/V visit. She indicated difficulty tolerating baclofen and was relying strictly on acetaminophen for headaches and neck pain. She continued noting left hemicranial headache associate with left lateral cervical pain about once a week on average. Her previous foot complaints had resolved, possibly with the benefit of lidocaine ointment.    I evaluated her most recently on 3/6/2023 in the office. She continued to experience left hemicranial headaches and also emphasized episodes of left hemibody pain. I discussed a suspected cervicogenic component and recommended a course of physical therapy targeting the cervical spine. I advised that she continue lidocaine ointment for foot pain.     She is evaluated again today in the office.      She indicates headaches have been manageable.  Headache frequency is relatively low.  She takes acetaminophen with  variable efficacy for breakthrough headache.    She still notes pain and subjective weakness in the left limbs which again she attributes to a history of COVID.  However, she is most notably bothered today by pain in the left neck and upper trapezius region as well as to the shoulder.  I reviewed a neck soft tissue CT from 8/30/2023 which shows reversed lordosis and moderate multilevel degenerative change without high-grade canal stenosis.  She has noted limited relief from Tylenol.  She has not tried a TENS unit.    She is stable on her feet without falls or need for assistive devices.    She indicates no visual complaints.    She reports occasional postural lightheadedness.    Neuropathic symptoms in her feet are intermittent and fleeting.    Interval medical history is notable for presentation to the ED 2 days ago with acute on chronic bronchitis.  She was placed on a short prednisone course which has been helping.  She indicates she was not febrile and that she tested negative for COVID and influenza.    Review of Systems    As per the history of present illness    Patient Active Problem List   Diagnosis    Allergic rhinitis    GERD (gastroesophageal reflux disease)    Thyromegaly    Anxiety and depression    Arthritis of left hip    Chronic lower back pain    COPD (chronic obstructive pulmonary disease) (CMS/HCC)    Diabetes type 2, controlled (CMS/HCC)    Diabetic peripheral neuropathy (CMS/HCC)    Elevated coronary artery calcium score    Fatigue    Fatty liver    Cervicogenic headache    Hearing loss    History of herpes genitalis    Hyperlipidemia    Hypertension    Abnormal findings on esophagogastroduodenoscopy (EGD)    Irritable bowel syndrome with constipation    Microalbuminuria due to type 2 diabetes mellitus (CMS/HCC)    Mild cognitive impairment    Multiple lung nodules on CT    Intermittent claudication (CMS/HCC)    Mass of left foot    Neuropathic pain of both feet    EMERY on CPAP    Vitamin D  deficiency    Thyroid nodule    Enlarged lymph node in neck    Abnormal otoacoustic emissions test    Acquired hammertoes of both feet    Chronic laryngitis    Congestion of nasal sinus    Diffusion capacity of lung (dl), decreased    Dizziness of unknown cause    Dyspnea on exertion    Elevated erythrocyte sedimentation rate    Encounter for diabetic foot exam (CMS/[facility])    Hair thinning    Leg cramping    Mass of left side of neck    Morbid obesity (CMS/[facility])    Class 2 severe obesity due to excess calories with serious comorbidity and body mass index (BMI) of 38.0 to 38.9 in adult (CMS/[facility])    Type 2 diabetes mellitus with hyperglycemia (CMS/[facility])    Weight gain    Constipation    Neck pain, chronic    Pulmonary emphysema (CMS/[facility])    Gastroesophageal reflux disease without esophagitis    Sleep apnea    Tension type headache    BMI 40.0-44.9, adult (CMS/[facility])     Past Medical History:   Diagnosis Date    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    Carotidynia    Other specified soft tissue disorders 09/15/2021    Calf swelling    Personal history of diseases of the blood and blood-forming organs and certain disorders involving the immune mechanism 11/05/2021    History of anemia    Personal history of other diseases of the circulatory system 09/30/2021    History of intermittent claudication    Personal history of other diseases of the respiratory system 09/07/2022    History of chronic obstructive lung disease    Personal history of other specified conditions 08/01/2022    History of dizziness    Shortness of breath 09/01/2021    SOB (shortness of breath) on exertion     No past surgical history on file.  Social History     Tobacco Use    Smoking status: Never    Smokeless tobacco: Never   Substance Use Topics    Alcohol use: Yes     Alcohol/week: 1.0 standard drink of alcohol     Types: 1 Glasses of wine per week     family history includes Coronary artery disease in her brother and sister; Heart attack in her brother;  Lung cancer in her sister; Suicide Attempts in her daughter.    Current Outpatient Medications:     albuterol 90 mcg/actuation inhaler, Inhale 2 puffs every 4 hours if needed for wheezing., Disp: 18 g, Rfl: 0    ascorbic acid (Vitamin C) 1,000 mg tablet, Take 1 tablet (1,000 mg) by mouth once daily., Disp: , Rfl:     aspirin 81 mg EC tablet, Take 1 tablet (81 mg) by mouth once daily., Disp: , Rfl:     blood pressure test kit (BLOOD PRESSURE MONITOR [facility]), USE AS DIRECTED  BP arm cuff with digital readout, Disp: , Rfl:     buPROPion SR (Wellbutrin SR) 150 mg 12 hr tablet, Take 1 tablet (150 mg) by mouth 2 times a day., Disp: , Rfl:     cholecalciferol, vitamin D3, 125 mcg (5,000 unit) tablet,disintegrating, Take 1 tablet by mouth once daily., Disp: , Rfl:     fluticasone (Flonase) 50 mcg/actuation nasal spray, Administer 2 sprays into each nostril once daily., Disp: 16 g, Rfl: 2    hydroCHLOROthiazide (HYDRODiuril) 25 mg tablet, Take 1 tablet (25 mg) by mouth once daily., Disp: 90 tablet, Rfl: 1    lancets 33 gauge [facility], Use As Directed twice daily, Disp: , Rfl:     lidocaine (Xylocaine) 5 % ointment, , Disp: , Rfl:     loratadine (Claritin) 10 mg tablet, Take 1 tablet (10 mg) by mouth once daily., Disp: , Rfl:     losartan (Cozaar) 25 mg tablet, TAKE 1 TABLET BY [facility] ONCE DAILY, Disp: 90 tablet, Rfl: 1    metFORMIN  mg 24 hr tablet, TAKE TWO TABLETS BY MOUTH once DAILY WITH EVENING MEAL, Disp: 180 tablet, Rfl: 1    metoprolol tartrate (Lopressor) 25 mg tablet, TAKE 1 TABLET BY MOUTH TWICE A DAY, Disp: 180 tablet, Rfl: 0    omeprazole (PriLOSEC) 40 mg DR capsule, Take 1 capsule (40 mg) by mouth once daily., Disp: 90 capsule, Rfl: 1    OneTouch Ultra Test strip, USE 1 STRIP --test daily; type 2 diabetes, Disp: 100 strip, Rfl: 2    Ozempic 2 mg/dose (8 mg/3 mL) pen injector, Inject 2 mg under the skin 1 (one) time per week., Disp: , Rfl:     potassium chloride CR 10 mEq ER tablet, Take one a day, Disp: 90  tablet, Rfl: 1    predniSONE (Deltasone) 20 mg tablet, Take 1 tablet (20 mg) by mouth once daily for 5 days., Disp: 5 tablet, Rfl: 0    rosuvastatin (Crestor) 20 mg tablet, TAKE ONE TABLET BY MOUTH ONCE DAILY, Disp: 90 tablet, Rfl: 1    tirzepatide (Mounjaro) 5 mg/0.5 mL pen injector, Inject 5 mg under the skin 1 (one) time per week., Disp: , Rfl:     tiZANidine (Zanaflex) 2 mg tablet, Take 1 tablet (2 mg) by mouth every 6 hours if needed for muscle spasms for up to 10 days., Disp: 30 tablet, Rfl: 0    triamcinolone (Kenalog) 0.1 % ointment, , Disp: , Rfl:     vitamin E 90 mg (200 unit) capsule, Take 1 tablet by mouth once daily., Disp: , Rfl:     vitamin-B complex split tablet, Take 1 tablet (2 half tablet) by mouth once daily., Disp: , Rfl:   Allergies   Allergen Reactions    Codeine Hives, Itching and Rash    Ace Inhibitors Swelling and Other    Atorvastatin Other    Penicillin Unknown    Sulfa (Sulfonamide Antibiotics) Other and Unknown       Objective   Neurological Exam  Physical Exam    General: Alert woman who was ambulatory without assistive devices.      Cranial Nerves:  Funduscopic exam was  not well visualized bilaterally on nondilated exam.  Pupils were equal, round and reactive to light with no relative afferent pupillary defect.  Extraocular movements were intact and conjugate without nystagmus.  No ptosis.  Visual fields were full to confrontation tested binocularly.  Facial sensation was symmetric to pin.  Facial motor function was symmetrically intact.  Hearing was grossly intact.  No dysarthria.  Shoulder shrug was symmetric.  Tongue protrusion was midline.    Motor: Muscle bulk and tone were normal throughout. There was no pronator drift or asymmetry of finger taps. Confrontation strength was symmetrically 5/5 throughout the upper and lower extremities.     Sensation: Vibration thresholds were normal at both great toes.    Station: Intact and stable.    Gait: Stable and unremarkable.    Other:  Left shoulder active range of motion was full including reaching her hand to the back of her neck, reaching her hand to her low back and raising the arm above her head in a statue of liberty posture.  Neck active range of motion was full and associated with modest discomfort on rotation of her head to either side      Assessment/Plan     She reports a stable low headache frequency.    We mainly discussed her left neck and upper trapezius/shoulder region pain today.  There is a likely cervicogenic basis as supported by multilevel degenerative change on the recent neck soft tissue CT, although there may also be some pathology at the shoulder joint.  The latter however would not appear to be severe as she does not have a painful catch on shoulder range of motion.  Tylenol is not adequately helping.  I suggested use of a TENS unit and I reviewed with her how this works.  I gave her an order in suggested she try the TENS unit for 20 minutes at a time on a as needed basis, as tolerated.  She is to notify my office if this is not effective and then I might suggest a formal pain management consultation.    I advised her to follow-up in the office in 8 months.

## 2023-11-16 NOTE — PATIENT INSTRUCTIONS
Your neurologic exam is stable today.    I am glad to hear that your headaches are not occurring frequently.    We discussed that Tylenol is not adequately helping your neck and shoulder pain.  I recommend trying a TENS unit.  This is a transcutaneous electrical stimulator.  You attach electrodes to your skin over the painful area using sticky pads.  There are different stimulator settings that can help to relieve the pain.  You can do this for 15-20 minutes throughout the day as needed when you are noting more pain.  See if this helps.    Please see me in the office in 8 months.

## 2023-11-29 ENCOUNTER — OFFICE VISIT (OUTPATIENT)
Dept: PRIMARY CARE | Facility: CLINIC | Age: 73
End: 2023-11-29
Payer: MEDICARE

## 2023-11-29 VITALS
SYSTOLIC BLOOD PRESSURE: 143 MMHG | DIASTOLIC BLOOD PRESSURE: 89 MMHG | WEIGHT: 187 LBS | BODY MASS INDEX: 36.52 KG/M2 | TEMPERATURE: 98.2 F | HEART RATE: 73 BPM

## 2023-11-29 DIAGNOSIS — I10 HYPERTENSION, UNSPECIFIED TYPE: ICD-10-CM

## 2023-11-29 DIAGNOSIS — R94.2 DIFFUSION CAPACITY OF LUNG (DL), DECREASED: ICD-10-CM

## 2023-11-29 DIAGNOSIS — F32.A ANXIETY AND DEPRESSION: ICD-10-CM

## 2023-11-29 DIAGNOSIS — G47.30 SLEEP APNEA, UNSPECIFIED TYPE: ICD-10-CM

## 2023-11-29 DIAGNOSIS — J42 CHRONIC BRONCHITIS, UNSPECIFIED CHRONIC BRONCHITIS TYPE (MULTI): Primary | ICD-10-CM

## 2023-11-29 DIAGNOSIS — J43.9 PULMONARY EMPHYSEMA, UNSPECIFIED EMPHYSEMA TYPE (MULTI): ICD-10-CM

## 2023-11-29 DIAGNOSIS — E78.5 HYPERLIPIDEMIA, UNSPECIFIED HYPERLIPIDEMIA TYPE: ICD-10-CM

## 2023-11-29 DIAGNOSIS — F41.9 ANXIETY AND DEPRESSION: ICD-10-CM

## 2023-11-29 DIAGNOSIS — R91.8 MULTIPLE LUNG NODULES ON CT: ICD-10-CM

## 2023-11-29 DIAGNOSIS — K21.9 GASTROESOPHAGEAL REFLUX DISEASE, UNSPECIFIED WHETHER ESOPHAGITIS PRESENT: ICD-10-CM

## 2023-11-29 DIAGNOSIS — E11.9 CONTROLLED TYPE 2 DIABETES MELLITUS WITHOUT COMPLICATION, UNSPECIFIED WHETHER LONG TERM INSULIN USE (MULTI): ICD-10-CM

## 2023-11-29 DIAGNOSIS — Z83.719 FAMILY HISTORY OF COLONIC POLYPS: ICD-10-CM

## 2023-11-29 DIAGNOSIS — E55.9 VITAMIN D DEFICIENCY: ICD-10-CM

## 2023-11-29 DIAGNOSIS — G47.33 OSA ON CPAP: ICD-10-CM

## 2023-11-29 PROBLEM — R22.1 MASS OF LEFT SIDE OF NECK: Status: RESOLVED | Noted: 2023-08-21 | Resolved: 2023-11-29

## 2023-11-29 LAB
EST. AVERAGE GLUCOSE BLD GHB EST-MCNC: 126 MG/DL
HBA1C MFR BLD: 6 %

## 2023-11-29 PROCEDURE — 99214 OFFICE O/P EST MOD 30 MIN: CPT | Performed by: PEDIATRICS

## 2023-11-29 PROCEDURE — 1159F MED LIST DOCD IN RCRD: CPT | Performed by: PEDIATRICS

## 2023-11-29 PROCEDURE — 1125F AMNT PAIN NOTED PAIN PRSNT: CPT | Performed by: PEDIATRICS

## 2023-11-29 PROCEDURE — 83036 HEMOGLOBIN GLYCOSYLATED A1C: CPT

## 2023-11-29 PROCEDURE — 1036F TOBACCO NON-USER: CPT | Performed by: PEDIATRICS

## 2023-11-29 PROCEDURE — 3008F BODY MASS INDEX DOCD: CPT | Performed by: PEDIATRICS

## 2023-11-29 PROCEDURE — 36415 COLL VENOUS BLD VENIPUNCTURE: CPT

## 2023-11-29 PROCEDURE — 3079F DIAST BP 80-89 MM HG: CPT | Performed by: PEDIATRICS

## 2023-11-29 PROCEDURE — 3077F SYST BP >= 140 MM HG: CPT | Performed by: PEDIATRICS

## 2023-11-29 PROCEDURE — 3044F HG A1C LEVEL LT 7.0%: CPT | Performed by: PEDIATRICS

## 2023-11-29 PROCEDURE — 4010F ACE/ARB THERAPY RXD/TAKEN: CPT | Performed by: PEDIATRICS

## 2023-11-29 RX ORDER — LOSARTAN POTASSIUM 50 MG/1
50 TABLET ORAL DAILY
Qty: 90 TABLET | Refills: 0 | Status: SHIPPED | OUTPATIENT
Start: 2023-11-29 | End: 2024-02-29 | Stop reason: SDUPTHER

## 2023-11-29 RX ORDER — ACETAMINOPHEN 500 MG
TABLET ORAL
Qty: 1 KIT | Refills: 0 | Status: SHIPPED | OUTPATIENT
Start: 2023-11-29

## 2023-11-29 RX ORDER — BUDESONIDE, GLYCOPYRROLATE, AND FORMOTEROL FUMARATE 160; 9; 4.8 UG/1; UG/1; UG/1
2 AEROSOL, METERED RESPIRATORY (INHALATION)
Qty: 10.7 G | Refills: 3 | Status: SHIPPED | OUTPATIENT
Start: 2023-11-29

## 2023-11-29 NOTE — PATIENT INSTRUCTIONS
Return in 3 months   Increase Losartan to 50  Take Omeprazole to twice a day and stop caffeine; Let me know if that does not work

## 2023-11-29 NOTE — PROGRESS NOTES
Subjective   Patient ID: Carolina Martinez is a 73 y.o. female who presents for COPD    HPI     Seen in ED 11/14 for SOB, hx of COPD. Work-up unremarkable, improved with albuterol and prednisone, finished steroids Sunday. Coughs up white frothy mucus;  Covid and flu negative  Colonoscopy negative in 2019 at [facility] but states dad had colon polyps  DEXA due 2026  Sugars 108, 106    Heartburn despite being on omeprazole for 2 months, feels like lump in throat; occ stomach discomfot  Review of Systems    Objective   /89   Pulse 73   Temp 36.8 °C (98.2 °F)   Wt 84.8 kg (187 lb)   LMP  (LMP Unknown)   BMI 36.52 kg/m²     Physical Exam  Lungs clear  Heart RRR  Feet ok  Assessment/Plan   Problem List Items Addressed This Visit             ICD-10-CM    GERD (gastroesophageal reflux disease) K21.9     Omeprazole not helping, feels like lump in throat         Relevant Orders    H. pylori antigen, stool    EGD    Anxiety and depression F41.9, F32.A     Well controlled on welbutrin         COPD (chronic obstructive pulmonary disease) (CMS/HCC) - Primary J44.9    Relevant Medications    budesonide-glycopyr-formoterol (Breztri Aerosphere) 160-9-4.8 mcg/actuation HFA aerosol inhaler    dextromethorphan-guaifenesin (Mucinex DM)  mg 12 hr tablet    Diabetes type 2, controlled (CMS/HCC) E11.9     Follows with endocrinologist   Last A1C good at 6.2         Relevant Orders    Hemoglobin A1C    Hemoglobin A1C (Completed)    Hyperlipidemia E78.5     Cholesterol 132 seven months ago; continue rosuva         Hypertension I10     On losartan 25 mg, metoprolol 25 mg, hydrochlorothiazide 25 mg         Relevant Medications    losartan (Cozaar) 50 mg tablet    blood pressure monitor kit    Multiple lung nodules on CT R91.8    EMERY on CPAP G47.33    Vitamin D deficiency E55.9     Cont vit D supplementation         Diffusion capacity of lung (dl), decreased R94.2    Pulmonary emphysema (CMS/HCC) J43.9    Sleep apnea G47.30      Wears cpap         Family history of colonic polyps Z83.719    Relevant Orders    Colonoscopy Screening; High Risk Patient

## 2023-12-04 DIAGNOSIS — Z12.11 SPECIAL SCREENING FOR MALIGNANT NEOPLASMS, COLON: ICD-10-CM

## 2023-12-05 RX ORDER — POLYETHYLENE GLYCOL 3350, SODIUM SULFATE ANHYDROUS, SODIUM BICARBONATE, SODIUM CHLORIDE, POTASSIUM CHLORIDE 236; 22.74; 6.74; 5.86; 2.97 G/4L; G/4L; G/4L; G/4L; G/4L
POWDER, FOR SOLUTION ORAL
Qty: 4000 ML | Refills: 0 | Status: SHIPPED
Start: 2023-12-05 | End: 2024-04-23 | Stop reason: ALTCHOICE

## 2023-12-05 NOTE — PROGRESS NOTES
"Provider Impressions     Right abnormal level 2 node that I could not appreciate clinically. I also could not appreciate any worrisome lesions in the head and neck. She has had this discomfort on the left side of her neck and face and ear since COVID. I certainly cannot explain that. After discussion we agreed to get a CT scan of her neck.     I will see her on a as needed basis.    Chief Complaint     Follow-up regarding a right-sided neck mass      History of Present Illness    This patient is seen at the request of her family physician. Over the past 3 years she has had some discomfort on the left side of her neck and ear and face. That seems to have been caused by COVID. It really has not changed much over that period of time. The patient had an ultrasound of her thyroid that was done back in July 2023 that showed a \"abnormal\" level 2 node on the right side. The patient has no symptoms related to that side of her throat.  She had a CT scan of her neck done in August 2023.  I personally reviewed that scan and do not appreciate anything worrisome.  Her only complaint today is a little scratchiness in her throat.    Physical Exam    Examination of the oral cavity and oropharynx is normal. There is no evidence of any mucosal lesions. There is good mobility of the tongue and palate. There is good mandibular excursion. Palpation of the parotid, neck, and thyroid field fails to show any worrisome masses or adenopathies. More specifically I cannot feel this abnormal node at the right level 2.     A flexible laryngoscopy was carried out. Under topical Xylocaine and Clifton-Synephrine the scope was introduced through the nostril. The nasopharynx, base of tongue, hypopharynx, and larynx are visualized. The vocal cords are normally mobile. There is no pooling of secretions in the piriform sinuses. There is no evidence of any mucosal lesions.   "

## 2023-12-06 ENCOUNTER — OFFICE VISIT (OUTPATIENT)
Dept: OTOLARYNGOLOGY | Facility: CLINIC | Age: 73
End: 2023-12-06
Payer: MEDICARE

## 2023-12-06 VITALS — BODY MASS INDEX: 37.6 KG/M2 | WEIGHT: 191.5 LBS | HEIGHT: 60 IN | TEMPERATURE: 97.7 F

## 2023-12-06 DIAGNOSIS — R59.0 ENLARGED LYMPH NODE IN NECK: Primary | ICD-10-CM

## 2023-12-06 PROCEDURE — 1036F TOBACCO NON-USER: CPT | Performed by: OTOLARYNGOLOGY

## 2023-12-06 PROCEDURE — 1125F AMNT PAIN NOTED PAIN PRSNT: CPT | Performed by: OTOLARYNGOLOGY

## 2023-12-06 PROCEDURE — 31575 DIAGNOSTIC LARYNGOSCOPY: CPT | Performed by: OTOLARYNGOLOGY

## 2023-12-06 PROCEDURE — 4010F ACE/ARB THERAPY RXD/TAKEN: CPT | Performed by: OTOLARYNGOLOGY

## 2023-12-06 PROCEDURE — 3044F HG A1C LEVEL LT 7.0%: CPT | Performed by: OTOLARYNGOLOGY

## 2023-12-06 PROCEDURE — 1159F MED LIST DOCD IN RCRD: CPT | Performed by: OTOLARYNGOLOGY

## 2023-12-06 PROCEDURE — 99213 OFFICE O/P EST LOW 20 MIN: CPT | Performed by: OTOLARYNGOLOGY

## 2023-12-06 PROCEDURE — 1160F RVW MEDS BY RX/DR IN RCRD: CPT | Performed by: OTOLARYNGOLOGY

## 2023-12-06 PROCEDURE — 3008F BODY MASS INDEX DOCD: CPT | Performed by: OTOLARYNGOLOGY

## 2023-12-12 ENCOUNTER — OFFICE VISIT (OUTPATIENT)
Dept: ORTHOPEDIC SURGERY | Facility: CLINIC | Age: 73
End: 2023-12-12
Payer: MEDICARE

## 2023-12-12 DIAGNOSIS — M16.12 ARTHRITIS OF LEFT HIP: Primary | ICD-10-CM

## 2023-12-12 PROCEDURE — 4010F ACE/ARB THERAPY RXD/TAKEN: CPT | Performed by: FAMILY MEDICINE

## 2023-12-12 PROCEDURE — 3008F BODY MASS INDEX DOCD: CPT | Performed by: FAMILY MEDICINE

## 2023-12-12 PROCEDURE — 99213 OFFICE O/P EST LOW 20 MIN: CPT | Performed by: FAMILY MEDICINE

## 2023-12-12 PROCEDURE — 3044F HG A1C LEVEL LT 7.0%: CPT | Performed by: FAMILY MEDICINE

## 2023-12-12 PROCEDURE — 1125F AMNT PAIN NOTED PAIN PRSNT: CPT | Performed by: FAMILY MEDICINE

## 2023-12-12 PROCEDURE — 1159F MED LIST DOCD IN RCRD: CPT | Performed by: FAMILY MEDICINE

## 2023-12-12 PROCEDURE — 20611 DRAIN/INJ JOINT/BURSA W/US: CPT | Performed by: FAMILY MEDICINE

## 2023-12-12 PROCEDURE — 1160F RVW MEDS BY RX/DR IN RCRD: CPT | Performed by: FAMILY MEDICINE

## 2023-12-12 PROCEDURE — 1036F TOBACCO NON-USER: CPT | Performed by: FAMILY MEDICINE

## 2023-12-12 RX ORDER — TRIAMCINOLONE ACETONIDE 40 MG/ML
80 INJECTION, SUSPENSION INTRA-ARTICULAR; INTRAMUSCULAR
Status: COMPLETED | OUTPATIENT
Start: 2023-12-12 | End: 2023-12-12

## 2023-12-12 RX ORDER — LIDOCAINE HYDROCHLORIDE 10 MG/ML
4 INJECTION INFILTRATION; PERINEURAL
Status: COMPLETED | OUTPATIENT
Start: 2023-12-12 | End: 2023-12-12

## 2023-12-12 RX ADMIN — LIDOCAINE HYDROCHLORIDE 4 ML: 10 INJECTION INFILTRATION; PERINEURAL at 07:56

## 2023-12-12 RX ADMIN — TRIAMCINOLONE ACETONIDE 80 MG: 40 INJECTION, SUSPENSION INTRA-ARTICULAR; INTRAMUSCULAR at 07:56

## 2023-12-12 NOTE — PROGRESS NOTES
** Please excuse any errors in grammar or translation related to this dictation. Voice recognition software was utilized to prepare this document. **    Assessment & Plan:  Patient here for ongoing management of left hip arthritis. Did well following last CSI in September.  Given previous positive response to steroid injection, offered to repeat today which patient was agreeable to have completed. Can have this repeated in 3 or more months as dictated by symptoms. Continue otc Tylenol as needed. If symptoms progressing, next option would be for referral to surgeon to discuss arthroplasty. All questions answered and patient agrees to this plan.       Chief complaint: L hip pain    HPI:  12/12/23:  Patient reports injection on 9/12 provided close to the 3 months relief. Occasional use of Tylenol if needed for breakthrough pain but this is infrequent.   Denies any new injuries.    9/12/23: F/u for left hip arthritis management. Patient reports injection on 6/15 provided close to 3 months relief. No reported progression in symptoms. She is requesting another injection.      6/15/23: Follow-up for left hip pain secondary to arthritis. Reports that steroid injection helped provide pain relief for nearly 3 months. Physical therapy has also been helping with her strengthening. States her therapist told her her range of motion of her hip is also been improving.      3/15/23: 71 y/o F, hx of obesity, DM and HTN, presents with left hip pain. Pain has been intermittently occurring for the past 4-5 years. Most recent pain has been continuous for 4 weeks. Pain is present in lateral hip and into groin. Pain has reduced her ROM. Pain increases with activity along with quick changes in weather temperature. Has been managing with otc analgesics Tylenol and Aleve which do not help at all. No previous injections or therapy for hip.    Exam:  No erythema, warmth or ecchymosis overlying injection site of left hip joint.   Limited hip  flexion and IR.  + FADIR.    Results:  3/15/23: Xrays of L hip demonstrate moderate degenerative changes.     Procedure:  Patient ID: Carolina Martinez is a 73 y.o. female.    L Inj/Asp: L hip joint on 12/12/2023 7:56 AM  Indications: pain  Details: 22 G needle, ultrasound-guided anterior approach  Medications: 80 mg triamcinolone acetonide 40 mg/mL; 4 mL lidocaine 10 mg/mL (1 %)  Outcome: tolerated well, no immediate complications  Procedure, treatment alternatives, risks and benefits explained, specific risks discussed. Consent was given by the patient. Immediately prior to procedure a time out was called to verify the correct patient, procedure, equipment, support staff and site/side marked as required. Patient was prepped and draped in the usual sterile fashion.

## 2023-12-14 DIAGNOSIS — I10 HYPERTENSION, UNSPECIFIED TYPE: ICD-10-CM

## 2023-12-14 NOTE — PROGRESS NOTES
FUV for diabetes. LV with me 2023.    History of Present Illness   73 y.o. female with hx of type 2 diabetes, obesity class III, EMERY, HTN, and HLD.     Dx:   HbA1c: 6.0% 2023), 5.9%(2023), 6.0% (2023), 6.8% (2022), 6.6% (2022), 6.5% (2021)  Current regimen: metformin  mg Qevening, Ozempic 2 mg/week  Past medications: glipizide, Trulicity (injection site rash)  Complications: neuropathy  Comorbidities: obesity, HTN, HLD, EMERY     SMBG: once a day (fasting)  Fastins-120s     Hypoglycemia: no     Diet: dinner is biggest meal  Grazes during the day on cookies, peanuts, pieces of chicken  Dinner (*biggest meal): subway/corned beef sandwich     Exercise: nothing regular    ROS  General: no fever or chills  CV: no chest pain   Respiratory: no shortness of breath  MSK: no lower extremity edema  Neuro: no headache or dizziness  See HPI for Endocrine ROS    Past Medical History:   Diagnosis Date    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    Carotidynia    Other specified soft tissue disorders 09/15/2021    Calf swelling    Personal history of diseases of the blood and blood-forming organs and certain disorders involving the immune mechanism 2021    History of anemia    Personal history of other diseases of the circulatory system 2021    History of intermittent claudication    Personal history of other diseases of the respiratory system 2022    History of chronic obstructive lung disease    Personal history of other specified conditions 2022    History of dizziness    Shortness of breath 2021    SOB (shortness of breath) on exertion       No past surgical history on file.    Social History     Socioeconomic History    Marital status:      Spouse name: Not on file    Number of children: Not on file    Years of education: Not on file    Highest education level: Not on file   Occupational History    Not on file   Tobacco Use    Smoking status: Former      Packs/day: 1.00     Years: 33.00     Additional pack years: 0.00     Total pack years: 33.00     Types: Cigarettes     Quit date:      Years since quittin.9    Smokeless tobacco: Never   Substance and Sexual Activity    Alcohol use: Yes     Alcohol/week: 1.0 standard drink of alcohol     Types: 1 Glasses of wine per week    Drug use: Never    Sexual activity: Not on file   Other Topics Concern    Not on file   Social History Narrative    Not on file     Social Determinants of Health     Financial Resource Strain: Not on file   Food Insecurity: Not on file   Transportation Needs: Not on file   Physical Activity: Not on file   Stress: Not on file   Social Connections: Not on file   Intimate Partner Violence: Not on file   Housing Stability: Not on file       Physical Exam   height is 1.524 m (5') and weight is 84.9 kg (187 lb 3.2 oz). Her blood pressure is 142/76 and her pulse is 88.   General: not in acute distress  HEENT: ARMAND TSE  Thyroid: no goiter  Neuro: alert and oriented x 3    Current Outpatient Medications   Medication Sig Dispense Refill    ascorbic acid (Vitamin C) 1,000 mg tablet Take 1 tablet (1,000 mg) by mouth once daily.      aspirin 81 mg EC tablet Take 1 tablet (81 mg) by mouth once daily.      blood pressure monitor kit Check BP daily 1 kit 0    blood pressure test kit (BLOOD PRESSURE MONITOR MISC) USE AS DIRECTED  BP arm cuff with digital readout      budesonide-glycopyr-formoterol (Breztri Aerosphere) 160-9-4.8 mcg/actuation HFA aerosol inhaler Inhale 2 puffs 2 times a day. 10.7 g 3    buPROPion SR (Wellbutrin SR) 150 mg 12 hr tablet Take 1 tablet (150 mg) by mouth 2 times a day.      cholecalciferol, vitamin D3, 125 mcg (5,000 unit) tablet,disintegrating Take 1 tablet by mouth once daily.      fluticasone (Flonase) 50 mcg/actuation nasal spray Administer 2 sprays into each nostril once daily. 16 g 2    hydroCHLOROthiazide (HYDRODiuril) 25 mg tablet Take 1 tablet (25 mg) by mouth once  daily. 90 tablet 1    lancets 33 gauge misc Use As Directed twice daily      lidocaine (Xylocaine) 5 % ointment       loratadine (Claritin) 10 mg tablet Take 1 tablet (10 mg) by mouth once daily.      losartan (Cozaar) 50 mg tablet Take 1 tablet (50 mg) by mouth once daily. 90 tablet 0    metFORMIN  mg 24 hr tablet TAKE TWO TABLETS BY MOUTH once DAILY WITH EVENING MEAL 180 tablet 1    metoprolol tartrate (Lopressor) 25 mg tablet TAKE 1 TABLET BY MOUTH TWICE A  tablet 0    omeprazole (PriLOSEC) 40 mg DR capsule Take 1 capsule (40 mg) by mouth once daily. 90 capsule 1    OneTouch Ultra Test strip USE 1 STRIP --test daily; type 2 diabetes 100 strip 2    Ozempic 2 mg/dose (8 mg/3 mL) pen injector Inject 2 mg under the skin 1 (one) time per week.      potassium chloride CR 10 mEq ER tablet Take one a day 90 tablet 1    rosuvastatin (Crestor) 20 mg tablet TAKE 1 TABLET BY MOUTH ONCE DAILY 90 tablet 0    vitamin E 90 mg (200 unit) capsule Take 1 tablet by mouth once daily.      vitamin-B complex split tablet Take 1 tablet (2 half tablet) by mouth once daily.      albuterol 90 mcg/actuation inhaler Inhale 2 puffs every 4 hours if needed for wheezing. 18 g 0    polyethylene glycol-electrolytes (Golytely) 420 gram solution Begin drinking first half of prep 6pm the night before procedure. Start second half 5 hours before procedure time. (Patient not taking: Reported on 12/19/2023) 4000 mL 0    tiZANidine (Zanaflex) 2 mg tablet Take 1 tablet (2 mg) by mouth every 6 hours if needed for muscle spasms for up to 10 days. 30 tablet 0     No current facility-administered medications for this visit.       Assessment and Plan  73 y.o. female with hx of type 2 diabetes, obesity class III, EMERY, HTN, and HLD, here for management of diabetes and obesity.     1. Type 2 diabetes mellitus, controlled  2. Class III obesity  HbA1c: 6.0% 11/29/2023), 5.9% (7/18/2023), 6.0% (03/2023), 6.8% (9/7/2022), 6.6% (5/17/2022), 6.5%  (09/2021)  Current regimen: metformin 500 mg Q evening, Ozempic 2 mg/week  Eye exam: no DR per patient (10/2023)  Urine microalbumin: 14.9 ug/gm (03/2023)  Podiatry: LV 3/22/2023  Lipids: HDL 50, LDL 61,  (03/2023) on rosuvastatin 20 mg     A1c: 6.0% in November.  Continues to maintain excellent control.  She reports one episode of having symptoms of hypoglycemia (sweating, shakiness). She barely had anything to eat the day before and was busy the next morning as well.  She did not check her blood sugars but symptoms resolved with glucose tablets.  It would be unusual for her current diabetes regimen to cause hypoglycemia. She does not think she took metformin the evening before.   She may have experience relative hypoglycemia given the prolonged fasting.  Advised her to check her blood sugars next time she has these symptoms.  Of note, she has not had any hypoglycemia or hypoglycemia symptoms for the past several years.     PLAN:  -continue Ozempic 2 mg/week (enrolled in [place] PAP-reapply in July 2024)  -continue metformin  mg Qevening  -check blood sugars once a day (alternate fasting and before dinner)  -check HbA1c, urine microalbumin, lipids before next visit    Follow-up in 5 months (labs prior)

## 2023-12-17 RX ORDER — ROSUVASTATIN CALCIUM 20 MG/1
20 TABLET, COATED ORAL DAILY
Qty: 90 TABLET | Refills: 0 | Status: SHIPPED | OUTPATIENT
Start: 2023-12-17 | End: 2024-03-19

## 2023-12-19 ENCOUNTER — OFFICE VISIT (OUTPATIENT)
Dept: ENDOCRINOLOGY | Facility: CLINIC | Age: 73
End: 2023-12-19
Payer: MEDICARE

## 2023-12-19 VITALS
DIASTOLIC BLOOD PRESSURE: 76 MMHG | WEIGHT: 187.2 LBS | HEART RATE: 88 BPM | HEIGHT: 60 IN | BODY MASS INDEX: 36.75 KG/M2 | SYSTOLIC BLOOD PRESSURE: 142 MMHG

## 2023-12-19 DIAGNOSIS — E11.65 TYPE 2 DIABETES MELLITUS WITH HYPERGLYCEMIA, WITHOUT LONG-TERM CURRENT USE OF INSULIN (MULTI): Primary | ICD-10-CM

## 2023-12-19 PROCEDURE — 3077F SYST BP >= 140 MM HG: CPT | Performed by: STUDENT IN AN ORGANIZED HEALTH CARE EDUCATION/TRAINING PROGRAM

## 2023-12-19 PROCEDURE — 1125F AMNT PAIN NOTED PAIN PRSNT: CPT | Performed by: STUDENT IN AN ORGANIZED HEALTH CARE EDUCATION/TRAINING PROGRAM

## 2023-12-19 PROCEDURE — 1160F RVW MEDS BY RX/DR IN RCRD: CPT | Performed by: STUDENT IN AN ORGANIZED HEALTH CARE EDUCATION/TRAINING PROGRAM

## 2023-12-19 PROCEDURE — 99215 OFFICE O/P EST HI 40 MIN: CPT | Performed by: STUDENT IN AN ORGANIZED HEALTH CARE EDUCATION/TRAINING PROGRAM

## 2023-12-19 PROCEDURE — 3044F HG A1C LEVEL LT 7.0%: CPT | Performed by: STUDENT IN AN ORGANIZED HEALTH CARE EDUCATION/TRAINING PROGRAM

## 2023-12-19 PROCEDURE — 1159F MED LIST DOCD IN RCRD: CPT | Performed by: STUDENT IN AN ORGANIZED HEALTH CARE EDUCATION/TRAINING PROGRAM

## 2023-12-19 PROCEDURE — 4010F ACE/ARB THERAPY RXD/TAKEN: CPT | Performed by: STUDENT IN AN ORGANIZED HEALTH CARE EDUCATION/TRAINING PROGRAM

## 2023-12-19 PROCEDURE — 3078F DIAST BP <80 MM HG: CPT | Performed by: STUDENT IN AN ORGANIZED HEALTH CARE EDUCATION/TRAINING PROGRAM

## 2023-12-19 PROCEDURE — 3008F BODY MASS INDEX DOCD: CPT | Performed by: STUDENT IN AN ORGANIZED HEALTH CARE EDUCATION/TRAINING PROGRAM

## 2023-12-19 PROCEDURE — 1036F TOBACCO NON-USER: CPT | Performed by: STUDENT IN AN ORGANIZED HEALTH CARE EDUCATION/TRAINING PROGRAM

## 2023-12-19 NOTE — PATIENT INSTRUCTIONS
Please have blood and urine tests a few days before your next appointment with me    2.   Continue Ozempic and metformin as you are

## 2024-01-29 ENCOUNTER — DOCUMENTATION (OUTPATIENT)
Dept: PHYSICAL THERAPY | Facility: CLINIC | Age: 74
End: 2024-01-29
Payer: MEDICARE

## 2024-01-29 NOTE — PROGRESS NOTES
Physical Therapy    Discharge Summary    Name: Carolina Martinez  MRN: [id number]  : 1950  Date: 24    Discharge Summary: PT    Discharge Information: Date of discharge 24    Therapy Summary: pt doing well overall    Discharge Status: discharge to HEP     Rehab Discharge Reason: Achieved all and/or the most significant goals(s)

## 2024-01-31 DIAGNOSIS — K21.9 GASTROESOPHAGEAL REFLUX DISEASE, UNSPECIFIED WHETHER ESOPHAGITIS PRESENT: ICD-10-CM

## 2024-02-01 RX ORDER — OMEPRAZOLE 40 MG/1
40 CAPSULE, DELAYED RELEASE ORAL DAILY
Qty: 90 CAPSULE | Refills: 0 | Status: SHIPPED | OUTPATIENT
Start: 2024-02-01 | End: 2024-04-30

## 2024-02-06 ENCOUNTER — OFFICE VISIT (OUTPATIENT)
Dept: GASTROENTEROLOGY | Facility: EXTERNAL LOCATION | Age: 74
End: 2024-02-06
Payer: MEDICARE

## 2024-02-06 DIAGNOSIS — K57.30 DIVERTICULOSIS OF LARGE INTESTINE WITHOUT DIVERTICULITIS: Primary | ICD-10-CM

## 2024-02-06 DIAGNOSIS — D12.3 BENIGN NEOPLASM OF TRANSVERSE COLON: ICD-10-CM

## 2024-02-06 DIAGNOSIS — K44.9 HIATAL HERNIA: ICD-10-CM

## 2024-02-06 DIAGNOSIS — K21.9 GASTROESOPHAGEAL REFLUX DISEASE, UNSPECIFIED WHETHER ESOPHAGITIS PRESENT: ICD-10-CM

## 2024-02-06 DIAGNOSIS — Z12.11 SPECIAL SCREENING FOR MALIGNANT NEOPLASMS, COLON: ICD-10-CM

## 2024-02-06 DIAGNOSIS — R12 HEARTBURN: ICD-10-CM

## 2024-02-06 DIAGNOSIS — K64.8 INTERNAL HEMORRHOIDS: ICD-10-CM

## 2024-02-06 DIAGNOSIS — Z83.719 FAMILY HISTORY OF COLONIC POLYPS: ICD-10-CM

## 2024-02-06 PROCEDURE — 43239 EGD BIOPSY SINGLE/MULTIPLE: CPT | Performed by: INTERNAL MEDICINE

## 2024-02-06 PROCEDURE — 1036F TOBACCO NON-USER: CPT | Performed by: INTERNAL MEDICINE

## 2024-02-06 PROCEDURE — 0753T DGTZ GLS MCRSCP SLD LEVEL IV: CPT

## 2024-02-06 PROCEDURE — 45380 COLONOSCOPY AND BIOPSY: CPT | Performed by: INTERNAL MEDICINE

## 2024-02-06 PROCEDURE — 3008F BODY MASS INDEX DOCD: CPT | Performed by: INTERNAL MEDICINE

## 2024-02-06 PROCEDURE — 1157F ADVNC CARE PLAN IN RCRD: CPT | Performed by: INTERNAL MEDICINE

## 2024-02-06 PROCEDURE — 1125F AMNT PAIN NOTED PAIN PRSNT: CPT | Performed by: INTERNAL MEDICINE

## 2024-02-06 PROCEDURE — 4010F ACE/ARB THERAPY RXD/TAKEN: CPT | Performed by: INTERNAL MEDICINE

## 2024-02-06 PROCEDURE — 88305 TISSUE EXAM BY PATHOLOGIST: CPT | Performed by: PATHOLOGY

## 2024-02-06 PROCEDURE — 88305 TISSUE EXAM BY PATHOLOGIST: CPT

## 2024-02-07 ENCOUNTER — LAB REQUISITION (OUTPATIENT)
Dept: LAB | Facility: HOSPITAL | Age: 74
End: 2024-02-07
Payer: MEDICARE

## 2024-02-09 LAB
LABORATORY COMMENT REPORT: NORMAL
PATH REPORT.FINAL DX SPEC: NORMAL
PATH REPORT.GROSS SPEC: NORMAL
PATH REPORT.RELEVANT HX SPEC: NORMAL
PATH REPORT.TOTAL CANCER: NORMAL

## 2024-02-23 NOTE — RESULT ENCOUNTER NOTE
The biopsies from your esophagus are normal. The biopsies from your stomach show mild nonspecific inflammation but no evidence of infection.     The polyp removed during your colonoscopy is an adenoma which is benign, but has the potential to turn into cancer if not removed.  Your polyp was completely removed.  Since adenomas have a tendency to recur, I recommend a repeat colonoscopy in 7 years.  We will contact you at that time to schedule an appointment. Please contact the office with any questions or concerns.      Lakeshia Hairston

## 2024-02-29 DIAGNOSIS — I10 HYPERTENSION, UNSPECIFIED TYPE: ICD-10-CM

## 2024-03-01 RX ORDER — LOSARTAN POTASSIUM 50 MG/1
100 TABLET ORAL DAILY
Qty: 90 TABLET | Refills: 0 | Status: SHIPPED | OUTPATIENT
Start: 2024-03-01 | End: 2024-04-20

## 2024-03-12 ENCOUNTER — OFFICE VISIT (OUTPATIENT)
Dept: ORTHOPEDIC SURGERY | Facility: CLINIC | Age: 74
End: 2024-03-12
Payer: MEDICARE

## 2024-03-12 DIAGNOSIS — M16.12 ARTHRITIS OF LEFT HIP: ICD-10-CM

## 2024-03-12 PROCEDURE — 20611 DRAIN/INJ JOINT/BURSA W/US: CPT | Performed by: FAMILY MEDICINE

## 2024-03-12 PROCEDURE — 1125F AMNT PAIN NOTED PAIN PRSNT: CPT | Performed by: FAMILY MEDICINE

## 2024-03-12 PROCEDURE — 4010F ACE/ARB THERAPY RXD/TAKEN: CPT | Performed by: FAMILY MEDICINE

## 2024-03-12 PROCEDURE — 1036F TOBACCO NON-USER: CPT | Performed by: FAMILY MEDICINE

## 2024-03-12 PROCEDURE — 1157F ADVNC CARE PLAN IN RCRD: CPT | Performed by: FAMILY MEDICINE

## 2024-03-12 PROCEDURE — 99214 OFFICE O/P EST MOD 30 MIN: CPT | Performed by: FAMILY MEDICINE

## 2024-03-12 PROCEDURE — 1159F MED LIST DOCD IN RCRD: CPT | Performed by: FAMILY MEDICINE

## 2024-03-12 PROCEDURE — 3008F BODY MASS INDEX DOCD: CPT | Performed by: FAMILY MEDICINE

## 2024-03-12 RX ORDER — TRIAMCINOLONE ACETONIDE 40 MG/ML
80 INJECTION, SUSPENSION INTRA-ARTICULAR; INTRAMUSCULAR
Status: COMPLETED | OUTPATIENT
Start: 2024-03-12 | End: 2024-03-12

## 2024-03-12 RX ORDER — LIDOCAINE HYDROCHLORIDE 10 MG/ML
4 INJECTION INFILTRATION; PERINEURAL
Status: COMPLETED | OUTPATIENT
Start: 2024-03-12 | End: 2024-03-12

## 2024-03-12 RX ADMIN — LIDOCAINE HYDROCHLORIDE 4 ML: 10 INJECTION INFILTRATION; PERINEURAL at 07:47

## 2024-03-12 RX ADMIN — TRIAMCINOLONE ACETONIDE 80 MG: 40 INJECTION, SUSPENSION INTRA-ARTICULAR; INTRAMUSCULAR at 07:47

## 2024-03-12 NOTE — PROGRESS NOTES
** Please excuse any errors in grammar or translation related to this dictation. Voice recognition software was utilized to prepare this document. **    Assessment & Plan:  Patient here for ongoing management of left hip arthritis. Recent exacerbation of symptoms.  Did well following last CSI in December 2023. Given previous positive response to steroid injection, offered to repeat today which patient was agreeable to have completed. Can have this repeated in 3 or more months as dictated by symptoms. Continue otc Tylenol as needed. If symptoms progressing, next option would be for referral to surgeon to discuss arthroplasty. All questions answered and patient agrees to this plan.       Chief complaint: L hip pain    HPI:  3/12/24:  Patient reports injection on 12/12/23 provided 3 months of relief. She only started having pain the last few days.  Denies any new injuries. Notes that she is using Ozempic for weight loss and that this has helped lessen the severity of her symptoms.  Will take Tylenol or naproxen intermittently for breakthrough symptoms.     12/12/23:  Patient reports injection on 9/12 provided close to the 3 months relief. Occasional use of Tylenol if needed for breakthrough pain but this is infrequent.   Denies any new injuries.    9/12/23: F/u for left hip arthritis management. Patient reports injection on 6/15 provided close to 3 months relief. No reported progression in symptoms. She is requesting another injection.      6/15/23: Follow-up for left hip pain secondary to arthritis. Reports that steroid injection helped provide pain relief for nearly 3 months. Physical therapy has also been helping with her strengthening. States her therapist told her her range of motion of her hip is also been improving.      3/15/23: 71 y/o F, hx of obesity, DM and HTN, presents with left hip pain. Pain has been intermittently occurring for the past 4-5 years. Most recent pain has been continuous for 4 weeks. Pain  is present in lateral hip and into groin. Pain has reduced her ROM. Pain increases with activity along with quick changes in weather temperature. Has been managing with otc analgesics Tylenol and Aleve which do not help at all. No previous injections or therapy for hip.    Exam:  No erythema, warmth or ecchymosis overlying injection site of left hip joint.   Limited hip flexion and IR.  + FADIR.    General Exam:  Constitutional - NAD, AAO x 3, conversing appropriately.  HEENT- Normocephalic and atraumatic. EOMI, PERRLA, No scleral icterus. No facial deformities. Hearing grossly normal.  Lungs - Breathing non-labored with normal rate. No accessory muscle use.  CV - Extremities warm and well-perfused, brisk capillary refill present.   Neuro - CN II-XII grossly intact.    Results:  3/15/23: Xrays of L hip demonstrate moderate degenerative changes.     Procedure:  Patient ID: Carolina Martinez is a 73 y.o. female.    L Inj/Asp: L hip joint on 3/12/2024 7:47 AM  Indications: pain  Details: 22 G needle, ultrasound-guided anterior approach  Medications: 80 mg triamcinolone acetonide 40 mg/mL; 4 mL lidocaine 10 mg/mL (1 %)  Outcome: tolerated well, no immediate complications    Procedure risk factors to include increased pain, bleeding, infection, neurovascular injury, soft tissue injury, transient elevation of blood glucose and blood pressure, and adverse reaction to medication were discussed with the patient. Patient understands there is a moderate risk of morbidity from undergoing the procedure.      Procedure, treatment alternatives, risks and benefits explained, specific risks discussed. Consent was given by the patient. Immediately prior to procedure a time out was called to verify the correct patient, procedure, equipment, support staff and site/side marked as required. Patient was prepped and draped in the usual sterile fashion.

## 2024-03-16 DIAGNOSIS — I10 HYPERTENSION, UNSPECIFIED TYPE: ICD-10-CM

## 2024-03-19 RX ORDER — ROSUVASTATIN CALCIUM 20 MG/1
20 TABLET, COATED ORAL DAILY
Qty: 90 TABLET | Refills: 0 | Status: SHIPPED | OUTPATIENT
Start: 2024-03-19

## 2024-04-02 ENCOUNTER — APPOINTMENT (OUTPATIENT)
Dept: RADIOLOGY | Facility: HOSPITAL | Age: 74
End: 2024-04-02
Payer: MEDICARE

## 2024-04-02 ENCOUNTER — APPOINTMENT (OUTPATIENT)
Dept: CARDIOLOGY | Facility: HOSPITAL | Age: 74
End: 2024-04-02
Payer: MEDICARE

## 2024-04-02 ENCOUNTER — HOSPITAL ENCOUNTER (EMERGENCY)
Facility: HOSPITAL | Age: 74
Discharge: HOME | End: 2024-04-02
Payer: MEDICARE

## 2024-04-02 VITALS
RESPIRATION RATE: 16 BRPM | TEMPERATURE: 98.7 F | BODY MASS INDEX: 34.36 KG/M2 | HEART RATE: 78 BPM | OXYGEN SATURATION: 97 % | HEIGHT: 60 IN | WEIGHT: 175 LBS | SYSTOLIC BLOOD PRESSURE: 154 MMHG | DIASTOLIC BLOOD PRESSURE: 88 MMHG

## 2024-04-02 DIAGNOSIS — M54.12 CERVICAL RADICULOPATHY: Primary | ICD-10-CM

## 2024-04-02 DIAGNOSIS — M50.20 HERNIATED CERVICAL DISC: ICD-10-CM

## 2024-04-02 DIAGNOSIS — M47.12 OSTEOARTHRITIS OF CERVICAL SPINE WITH MYELOPATHY: ICD-10-CM

## 2024-04-02 LAB
P OFFSET: 130 MS
P ONSET: 110 MS
Q ONSET: 224 MS
QRS COUNT: 12 BEATS
QRS DURATION: 54 MS
QT INTERVAL: 354 MS
QTC CALCULATION(BAZETT): 400 MS
QTC FREDERICIA: 384 MS
R AXIS: 25 DEGREES
T AXIS: 36 DEGREES
T OFFSET: 401 MS
VENTRICULAR RATE: 77 BPM

## 2024-04-02 PROCEDURE — 2500000004 HC RX 250 GENERAL PHARMACY W/ HCPCS (ALT 636 FOR OP/ED): Performed by: PHYSICIAN ASSISTANT

## 2024-04-02 PROCEDURE — 99284 EMERGENCY DEPT VISIT MOD MDM: CPT | Mod: 25

## 2024-04-02 PROCEDURE — 96372 THER/PROPH/DIAG INJ SC/IM: CPT | Performed by: PHYSICIAN ASSISTANT

## 2024-04-02 PROCEDURE — 72125 CT NECK SPINE W/O DYE: CPT | Performed by: RADIOLOGY

## 2024-04-02 PROCEDURE — 93005 ELECTROCARDIOGRAM TRACING: CPT

## 2024-04-02 PROCEDURE — 72125 CT NECK SPINE W/O DYE: CPT

## 2024-04-02 RX ORDER — ORPHENADRINE CITRATE 30 MG/ML
30 INJECTION INTRAMUSCULAR; INTRAVENOUS ONCE
Status: COMPLETED | OUTPATIENT
Start: 2024-04-02 | End: 2024-04-02

## 2024-04-02 RX ORDER — KETOROLAC TROMETHAMINE 30 MG/ML
30 INJECTION, SOLUTION INTRAMUSCULAR; INTRAVENOUS ONCE
Status: COMPLETED | OUTPATIENT
Start: 2024-04-02 | End: 2024-04-02

## 2024-04-02 RX ORDER — GABAPENTIN 100 MG/1
100 CAPSULE ORAL EVERY 8 HOURS PRN
Qty: 15 CAPSULE | Refills: 0 | Status: SHIPPED | OUTPATIENT
Start: 2024-04-02 | End: 2024-04-23 | Stop reason: ALTCHOICE

## 2024-04-02 RX ORDER — METHOCARBAMOL 500 MG/1
500 TABLET, FILM COATED ORAL EVERY 12 HOURS PRN
Qty: 20 TABLET | Refills: 0 | Status: SHIPPED | OUTPATIENT
Start: 2024-04-02

## 2024-04-02 RX ADMIN — KETOROLAC TROMETHAMINE 30 MG: 30 INJECTION, SOLUTION INTRAMUSCULAR at 10:37

## 2024-04-02 RX ADMIN — ORPHENADRINE CITRATE 30 MG: 60 INJECTION INTRAMUSCULAR; INTRAVENOUS at 10:37

## 2024-04-02 ASSESSMENT — COLUMBIA-SUICIDE SEVERITY RATING SCALE - C-SSRS
2. HAVE YOU ACTUALLY HAD ANY THOUGHTS OF KILLING YOURSELF?: NO
1. IN THE PAST MONTH, HAVE YOU WISHED YOU WERE DEAD OR WISHED YOU COULD GO TO SLEEP AND NOT WAKE UP?: NO
6. HAVE YOU EVER DONE ANYTHING, STARTED TO DO ANYTHING, OR PREPARED TO DO ANYTHING TO END YOUR LIFE?: NO

## 2024-04-02 ASSESSMENT — PAIN SCALES - GENERAL: PAINLEVEL_OUTOF10: 10 - WORST POSSIBLE PAIN

## 2024-04-02 ASSESSMENT — PAIN - FUNCTIONAL ASSESSMENT: PAIN_FUNCTIONAL_ASSESSMENT: 0-10

## 2024-04-02 NOTE — ED PROVIDER NOTES
HPI   Chief Complaint   Patient presents with    Torticollis    Shoulder Pain       History of present illness: 73-year-old female complains of neck and shoulder pain for 1 day.  She says she has had similar pain in the past but it has not been recent.  Says the pain is 10 and 10, aching, rating from her right shoulder down her right arm.  Denies any specific injuries.  Denies any paresthesias or weakness.  Denies headache, diplopia, blurred vision, incontinence, chest pain, shortness of breath, lightheadedness, dizziness.  Patient has tried Salonpas and Tylenol arthritis and heating pad which is not provided significant improvement of symptoms.    Review of systems: Constitutional, eye, ENT, cardiovascular, respiratory, gastrointestinal, genitourinary, neurologic, musculoskeletal, dermatologic, hematologic, endocrine systems were evaluated and were negative unless otherwise specified in history of present illness.    Medications: Reviewed and per nursing note.    Family history: Denies relevant medical conditions.    Social history: Denies tobacco, alcohol, drug use.      Physical exam:    Appearance: Well-developed, well-nourished, nontoxic-appearing, alert and oriented x3. Talking in complete sentences.    HEENT:  Head normocephalic atraumatic, extraocular movements intact, pupils equal round reactive to light, mucous membranes are moist and pink.  Normal tympanic membranes.    NECK: Right paracervical tenderness and spasm with no vertebral point tenderness.  Nml Inspection, no meningismus, no thyromegaly, no lymphadenopathy, no JVD, trachea is midline.    Respiratory: Clear to auscultation bilaterally with normal bilateral excursion. No wheezes, rhonchi, crackles.    Cardiovascular: Regular rate and rhythm, no murmurs, rubs or gallops. Pulses 2+ symmetrically in the dorsalis pedis and radial pulses.    Abdomen/GI:  Soft, nontender, nondistended, normal bowel sounds x4. No masses or organomegaly.    :  No CVA  tenderness    Neuro:  Oriented x 3, Speech Clear, cranial nerves grossly intact. Normal sensation to light touch in all 4 extremities.    Musculoskeletal: Patient spontaneously moves all 4 extremities.    Skin:  No cyanosis, clubbing, edema, open wounds, or rashes.                          No data recorded                   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    Carotidynia    Other specified soft tissue disorders 09/15/2021    Calf swelling    Personal history of diseases of the blood and blood-forming organs and certain disorders involving the immune mechanism 2021    History of anemia    Personal history of other diseases of the circulatory system 2021    History of intermittent claudication    Personal history of other diseases of the respiratory system 2022    History of chronic obstructive lung disease    Personal history of other specified conditions 2022    History of dizziness    Shortness of breath 2021    SOB (shortness of breath) on exertion     History reviewed. No pertinent surgical history.  Family History   Problem Relation Name Age of Onset    Coronary artery disease Sister          Stent patent    Lung cancer Sister      Coronary artery disease Brother      Heart attack Brother      Suicide Attempts Daughter       Social History     Tobacco Use    Smoking status: Former     Packs/day: 1.00     Years: 33.00     Additional pack years: 0.00     Total pack years: 33.00     Types: Cigarettes     Quit date:      Years since quittin.2    Smokeless tobacco: Never   Substance Use Topics    Alcohol use: Yes     Alcohol/week: 1.0 standard drink of alcohol     Types: 1 Glasses of wine per week    Drug use: Never       Physical Exam   ED Triage Vitals [24 1006]   Temperature Heart Rate Respirations BP   37.1 °C (98.7 °F) 82 15 154/88      Pulse Ox Temp src Heart Rate Source Patient Position   98 % -- -- --       BP Location FiO2 (%)     -- --       Physical Exam    ED Course & MDM   Diagnoses as of 04/02/24 1332   Cervical radiculopathy   Osteoarthritis of cervical spine with myelopathy   Herniated cervical disc       Medical Decision Making  EKG: Normal sinus rhythm with no ST segment elevation or ectopy.  Baseline quality is poor in some leads.  First-degree AV block as has been seen previously.  QTc 400.  No clear acute ischemia.  This interpreted by myself.    CT cervical spine wo IV contrast   Final Result    Mild multilevel cervical spondylosis.          Straightening of cervical lordosis and mild dextro convexity of the    cervical spine.          No sign of acute fracture or subluxation. Mild multilevel bulging    disc with facet and uncovertebral arthrosis. There is mild to    moderate multilevel bilateral neural foraminal narrowing as detailed    above. There is no significant central canal stenosis.                MACRO:    None          Signed by: Doroteo Nagy 4/2/2024 12:26 PM    Dictation workstation:   [id number]         73-year-old female complains of neck pain since yesterday.  Differential diagnosis of fracture dislocation sprain strain cervical radiculopathy carotid dissection coronary syndrome.  No carotid bruit or neurologic symptoms making carotid dissection unlikely.  Patient has paracervical tenderness and spasm with radicular type pain rating down the right arm.  This is worse with movement.    CT cervical spine EKG ordered.  Given Toradol Norflex IM.    Patient had some improvement of symptoms.  CT shows disc herniations and degenerative changes with neuroforaminal narrowing.  No fracture or subluxation.  Presentation consistent with cervical radiculopathy likely secondary to herniated disc and osteoarthritis.    Patient will be discharged to home with prescription for gabapentin and Robaxin.  Patient is educated in signs and symptoms of worsening symptoms and reasons to come back to the  emergency department.  Will need to follow up with spine.  Patient does not report social determinants of health impacting ability to obtain care that is needed.  Patient agrees with plan.    This is a transcription.  Text was reviewed for errors, but some transcription errors may remain.  Please call for any questions.              Procedure  Procedures     Emanuel Rodriguez PA-C  04/02/24 5312

## 2024-04-02 NOTE — ED TRIAGE NOTES
PT TO ED FROM HOME WITH C/O RIGHT SIDED NECK AND SHOULDER PAIN THAT STARTED YESTERDAY WITH NO KNOWN INJURY. PAIN NOT RELIVED WITH OTC MEDICATIONS

## 2024-04-16 ENCOUNTER — OFFICE VISIT (OUTPATIENT)
Dept: ORTHOPEDIC SURGERY | Facility: CLINIC | Age: 74
End: 2024-04-16
Payer: MEDICARE

## 2024-04-16 VITALS — HEIGHT: 60 IN | BODY MASS INDEX: 34.36 KG/M2 | WEIGHT: 175 LBS

## 2024-04-16 DIAGNOSIS — M54.12 CERVICAL RADICULOPATHY: ICD-10-CM

## 2024-04-16 PROCEDURE — 3008F BODY MASS INDEX DOCD: CPT | Performed by: ORTHOPAEDIC SURGERY

## 2024-04-16 PROCEDURE — 1159F MED LIST DOCD IN RCRD: CPT | Performed by: ORTHOPAEDIC SURGERY

## 2024-04-16 PROCEDURE — 4010F ACE/ARB THERAPY RXD/TAKEN: CPT | Performed by: ORTHOPAEDIC SURGERY

## 2024-04-16 PROCEDURE — 99203 OFFICE O/P NEW LOW 30 MIN: CPT | Performed by: ORTHOPAEDIC SURGERY

## 2024-04-16 PROCEDURE — 1157F ADVNC CARE PLAN IN RCRD: CPT | Performed by: ORTHOPAEDIC SURGERY

## 2024-04-16 PROCEDURE — 99213 OFFICE O/P EST LOW 20 MIN: CPT | Performed by: ORTHOPAEDIC SURGERY

## 2024-04-16 ASSESSMENT — PAIN - FUNCTIONAL ASSESSMENT: PAIN_FUNCTIONAL_ASSESSMENT: 0-10

## 2024-04-16 NOTE — LETTER
April 16, 2024     Ashtyn Capps MD  [address]  [address]  [facility] [address]    Patient: Carolina Martinez   YOB: 1950   Date of Visit: 4/16/2024       Dear Dr. Ashtyn Capps MD:    Thank you for referring Carolina Martinez to me for evaluation. Below are my notes for this consultation.  If you have questions, please do not hesitate to call me. I look forward to following your patient along with you.       Sincerely,     Keith Arias MD      CC: No Recipients  ______________________________________________________________________________________    HPI:Caroilna Martinez is a 73-year-old woman, who comes in with complaints of acute right-sided neck pain shoulder pain arm pain.  It was bad enough that she went to the emergency room.  The symptoms have gradually improved on gabapentin and Tylenol arthritis.  She has not had any recent physical therapy.  She denies any myelopathic symptoms.      ROS:  Reviewed on EMR and patient intake sheet.    PMH/SH:   Reviewed on EMR and patient intake sheet.    Exam:  Physical Exam    Constitutional: Well appearing; no acute distress  Eyes: pupils are equal and round  Psych: normal affect  Respiratory: non-labored breathing  Cardiovascular: regular rate and rhythm  GI: non-distended abdomen  Musculoskeletal: no pain with range of motion of the shoulders bilaterally; no signs of impingement  Neurologic: [4]/5 strength in the upper extremities bilaterally]; [negative] Serna's; [no hyper-reflexia]    Radiology:  CT scan demonstrates moderate foraminal narrowing at C5-6 secondary to a disc osteophyte complex.  No significant central canal stenosis.    Diagnosis:  Acute cervical radiculopathy     Assessment and Plan:   73-year-old woman, with an acute cervical radiculopathy secondary to foraminal stenosis.  At this time I recommended physical therapy and cervical traction.  If her symptoms persist despite this management, then an MRI and follow-up would be  appropriate.    The patient was in agreement with the plan. At the end of the visit today, the patient felt that all questions had been answered satisfactorily.  The patient was pleased with the visit and very appreciative for the care rendered.     Thank you very much for the kind referral.  It is a privilege, and a pleasure, to partner with you in the care of your patients.  I would be delighted to assist you with any further consultations as needed.      Keith Arias MD    Chief of Spine Surgery, [facility]  Director of Spine Service, [facility]  , Department of Orthopaedics  [facility] [employer]  [address] [id number]  P: [phone]  [facility]    This note was dictated with voice recognition software.  It has not been proofread for grammatical errors, typographical mistakes or other semantic inconsistencies.

## 2024-04-16 NOTE — PROGRESS NOTES
HPI:Carolina Martinez is a 73-year-old woman, who comes in with complaints of acute right-sided neck pain shoulder pain arm pain.  It was bad enough that she went to the emergency room.  The symptoms have gradually improved on gabapentin and Tylenol arthritis.  She has not had any recent physical therapy.  She denies any myelopathic symptoms.      ROS:  Reviewed on EMR and patient intake sheet.    PMH/SH:   Reviewed on EMR and patient intake sheet.    Exam:  Physical Exam    Constitutional: Well appearing; no acute distress  Eyes: pupils are equal and round  Psych: normal affect  Respiratory: non-labored breathing  Cardiovascular: regular rate and rhythm  GI: non-distended abdomen  Musculoskeletal: no pain with range of motion of the shoulders bilaterally; no signs of impingement  Neurologic: [4]/5 strength in the upper extremities bilaterally]; [negative] Serna's; [no hyper-reflexia]    Radiology:  CT scan demonstrates moderate foraminal narrowing at C5-6 secondary to a disc osteophyte complex.  No significant central canal stenosis.    Diagnosis:  Acute cervical radiculopathy     Assessment and Plan:   73-year-old woman, with an acute cervical radiculopathy secondary to foraminal stenosis.  At this time I recommended physical therapy and cervical traction.  If her symptoms persist despite this management, then an MRI and follow-up would be appropriate.    The patient was in agreement with the plan. At the end of the visit today, the patient felt that all questions had been answered satisfactorily.  The patient was pleased with the visit and very appreciative for the care rendered.     Thank you very much for the kind referral.  It is a privilege, and a pleasure, to partner with you in the care of your patients.  I would be delighted to assist you with any further consultations as needed.      Keith Arias MD    Chief of Spine Surgery, [facility]  Director of Spine Service,  [facility]  , Department of Orthopaedics  [facility] [employer]  [address]  [place] [id number]  P: [phone]  [email or web address]    This note was dictated with voice recognition software.  It has not been proofread for grammatical errors, typographical mistakes or other semantic inconsistencies.

## 2024-04-19 DIAGNOSIS — I10 HYPERTENSION, UNSPECIFIED TYPE: ICD-10-CM

## 2024-04-20 RX ORDER — LOSARTAN POTASSIUM 50 MG/1
100 TABLET ORAL DAILY
Qty: 30 TABLET | Refills: 0 | Status: SHIPPED | OUTPATIENT
Start: 2024-04-20

## 2024-04-23 ENCOUNTER — OFFICE VISIT (OUTPATIENT)
Dept: PRIMARY CARE | Facility: CLINIC | Age: 74
End: 2024-04-23
Payer: MEDICARE

## 2024-04-23 VITALS
OXYGEN SATURATION: 100 % | SYSTOLIC BLOOD PRESSURE: 116 MMHG | HEIGHT: 60 IN | RESPIRATION RATE: 19 BRPM | BODY MASS INDEX: 34.55 KG/M2 | HEART RATE: 80 BPM | WEIGHT: 176 LBS | DIASTOLIC BLOOD PRESSURE: 69 MMHG | TEMPERATURE: 97.5 F

## 2024-04-23 DIAGNOSIS — R42 DIZZINESS OF UNKNOWN CAUSE: ICD-10-CM

## 2024-04-23 DIAGNOSIS — G47.33 OSA ON CPAP: ICD-10-CM

## 2024-04-23 DIAGNOSIS — R59.0 ENLARGED LYMPH NODE IN NECK: ICD-10-CM

## 2024-04-23 DIAGNOSIS — G44.86 CERVICOGENIC HEADACHE: ICD-10-CM

## 2024-04-23 DIAGNOSIS — E11.42 DIABETIC PERIPHERAL NEUROPATHY (MULTI): ICD-10-CM

## 2024-04-23 DIAGNOSIS — M54.12 CERVICAL RADICULOPATHY: Primary | ICD-10-CM

## 2024-04-23 DIAGNOSIS — E04.1 THYROID NODULE: ICD-10-CM

## 2024-04-23 DIAGNOSIS — Z83.719 FAMILY HISTORY OF COLONIC POLYPS: ICD-10-CM

## 2024-04-23 DIAGNOSIS — I10 HYPERTENSION, UNSPECIFIED TYPE: ICD-10-CM

## 2024-04-23 DIAGNOSIS — M16.12 ARTHRITIS OF LEFT HIP: ICD-10-CM

## 2024-04-23 DIAGNOSIS — E66.01 CLASS 2 SEVERE OBESITY DUE TO EXCESS CALORIES WITH SERIOUS COMORBIDITY AND BODY MASS INDEX (BMI) OF 36.0 TO 36.9 IN ADULT (MULTI): ICD-10-CM

## 2024-04-23 DIAGNOSIS — E11.65 TYPE 2 DIABETES MELLITUS WITH HYPERGLYCEMIA, WITHOUT LONG-TERM CURRENT USE OF INSULIN (MULTI): ICD-10-CM

## 2024-04-23 DIAGNOSIS — E11.9 CONTROLLED TYPE 2 DIABETES MELLITUS WITHOUT COMPLICATION, UNSPECIFIED WHETHER LONG TERM INSULIN USE (MULTI): ICD-10-CM

## 2024-04-23 DIAGNOSIS — Z12.31 SCREENING MAMMOGRAM FOR BREAST CANCER: ICD-10-CM

## 2024-04-23 DIAGNOSIS — E78.5 HYPERLIPIDEMIA, UNSPECIFIED HYPERLIPIDEMIA TYPE: ICD-10-CM

## 2024-04-23 DIAGNOSIS — K76.0 FATTY LIVER: ICD-10-CM

## 2024-04-23 PROBLEM — E01.0 THYROMEGALY: Status: RESOLVED | Noted: 2023-06-28 | Resolved: 2024-04-23

## 2024-04-23 PROBLEM — R53.83 FATIGUE: Status: RESOLVED | Noted: 2023-06-30 | Resolved: 2024-04-23

## 2024-04-23 PROBLEM — M54.50 CHRONIC LOWER BACK PAIN: Status: RESOLVED | Noted: 2023-06-30 | Resolved: 2024-04-23

## 2024-04-23 PROBLEM — G89.29 CHRONIC LOWER BACK PAIN: Status: RESOLVED | Noted: 2023-06-30 | Resolved: 2024-04-23

## 2024-04-23 PROBLEM — R63.5 WEIGHT GAIN: Status: RESOLVED | Noted: 2023-08-21 | Resolved: 2024-04-23

## 2024-04-23 LAB
CREAT UR-MCNC: 63.5 MG/DL (ref 20–320)
MICROALBUMIN UR-MCNC: <7 MG/L
MICROALBUMIN/CREAT UR: NORMAL MG/G{CREAT}

## 2024-04-23 PROCEDURE — 85025 COMPLETE CBC W/AUTO DIFF WBC: CPT | Performed by: PEDIATRICS

## 2024-04-23 PROCEDURE — 1126F AMNT PAIN NOTED NONE PRSNT: CPT | Performed by: PEDIATRICS

## 2024-04-23 PROCEDURE — 99214 OFFICE O/P EST MOD 30 MIN: CPT | Performed by: PEDIATRICS

## 2024-04-23 PROCEDURE — 80053 COMPREHEN METABOLIC PANEL: CPT | Performed by: PEDIATRICS

## 2024-04-23 PROCEDURE — 3078F DIAST BP <80 MM HG: CPT | Performed by: PEDIATRICS

## 2024-04-23 PROCEDURE — 82570 ASSAY OF URINE CREATININE: CPT

## 2024-04-23 PROCEDURE — 84443 ASSAY THYROID STIM HORMONE: CPT | Performed by: PEDIATRICS

## 2024-04-23 PROCEDURE — 1170F FXNL STATUS ASSESSED: CPT | Performed by: PEDIATRICS

## 2024-04-23 PROCEDURE — 82043 UR ALBUMIN QUANTITATIVE: CPT

## 2024-04-23 PROCEDURE — 80061 LIPID PANEL: CPT | Performed by: PEDIATRICS

## 2024-04-23 PROCEDURE — 83036 HEMOGLOBIN GLYCOSYLATED A1C: CPT | Performed by: PEDIATRICS

## 2024-04-23 PROCEDURE — 3062F POS MACROALBUMINURIA REV: CPT | Performed by: PEDIATRICS

## 2024-04-23 PROCEDURE — 3008F BODY MASS INDEX DOCD: CPT | Performed by: PEDIATRICS

## 2024-04-23 PROCEDURE — 4010F ACE/ARB THERAPY RXD/TAKEN: CPT | Performed by: PEDIATRICS

## 2024-04-23 PROCEDURE — 1157F ADVNC CARE PLAN IN RCRD: CPT | Performed by: PEDIATRICS

## 2024-04-23 PROCEDURE — 1159F MED LIST DOCD IN RCRD: CPT | Performed by: PEDIATRICS

## 2024-04-23 PROCEDURE — 3074F SYST BP LT 130 MM HG: CPT | Performed by: PEDIATRICS

## 2024-04-23 PROCEDURE — 1036F TOBACCO NON-USER: CPT | Performed by: PEDIATRICS

## 2024-04-23 RX ORDER — METFORMIN HYDROCHLORIDE 500 MG/1
500 TABLET, EXTENDED RELEASE ORAL
Qty: 90 TABLET | Refills: 1 | Status: SHIPPED | OUTPATIENT
Start: 2024-04-23

## 2024-04-23 RX ORDER — CHOLECALCIFEROL (VITAMIN D3) 50 MCG
TABLET ORAL
COMMUNITY

## 2024-04-23 RX ORDER — GABAPENTIN 300 MG/1
300 CAPSULE ORAL 3 TIMES DAILY
Qty: 90 CAPSULE | Refills: 5 | Status: SHIPPED | OUTPATIENT
Start: 2024-04-23 | End: 2024-10-20

## 2024-04-23 RX ORDER — TIZANIDINE 2 MG/1
TABLET ORAL
Qty: 60 TABLET | Refills: 1 | Status: SHIPPED | OUTPATIENT
Start: 2024-04-23

## 2024-04-23 ASSESSMENT — ACTIVITIES OF DAILY LIVING (ADL)
GROCERY_SHOPPING: INDEPENDENT
DOING_HOUSEWORK: INDEPENDENT
TAKING_MEDICATION: INDEPENDENT
BATHING: INDEPENDENT
DRESSING: INDEPENDENT
MANAGING_FINANCES: INDEPENDENT

## 2024-04-23 ASSESSMENT — PATIENT HEALTH QUESTIONNAIRE - PHQ9
2. FEELING DOWN, DEPRESSED OR HOPELESS: NOT AT ALL
1. LITTLE INTEREST OR PLEASURE IN DOING THINGS: NOT AT ALL
SUM OF ALL RESPONSES TO PHQ9 QUESTIONS 1 AND 2: 0

## 2024-04-23 ASSESSMENT — PAIN SCALES - GENERAL: PAINLEVEL: 0-NO PAIN

## 2024-04-23 NOTE — PROGRESS NOTES
Subjective   Reason for Visit: Carolina Martinez is an 73 y.o. female here for a Medicare Wellness visit.          Reviewed all medications by prescribing practitioner or clinical pharmacist (such as prescriptions, OTCs, herbal therapies and supplements) and documented in the medical record.    HPI  Patient was in ER for neck pain with radiation down right arm (sometimes left arm in the past) recently and anticipates going to PT. Gabapentin 200 has helped bring pain down from 10 to 7. Will try 300 mg;  Notices stomach gets upset on Metformin 1000; sugars low 100s; will try cutting Metformin to 500.  Colonoscopy due  Dexa due 2026  Patient Care Team:  Ashtyn Capps MD as PCP - General (Pediatrics)  Ashtyn Capps MD as PCP - [employer] PCP     Review of Systems  Had a recent cold; might have white phlegm when she coughs  Objective   Vitals:  /69 (BP Location: Right arm, Patient Position: Sitting, BP Cuff Size: Large adult)   Pulse 80   Temp 36.4 °C (97.5 °F) (Temporal)   Resp 19   Ht 1.524 m (5')   Wt 79.8 kg (176 lb)   LMP  (LMP Unknown)   SpO2 100%   BMI 34.37 kg/m²       Physical Exam  Vitals reviewed.   Constitutional:       General: She is not in acute distress.  HENT:      Head: Normocephalic.      Right Ear: Tympanic membrane normal.      Left Ear: Tympanic membrane normal.      Nose: Nose normal.      Mouth/Throat:      Pharynx: Oropharynx is clear.   Cardiovascular:      Rate and Rhythm: Normal rate and regular rhythm.      Heart sounds: Normal heart sounds.   Pulmonary:      Breath sounds: Normal breath sounds.   Abdominal:      Palpations: Abdomen is soft.      Tenderness: There is no abdominal tenderness.   Musculoskeletal:         General: No tenderness.   Skin:     Findings: No rash.   Neurological:      General: No focal deficit present.      Mental Status: She is alert.   Psychiatric:         Mood and Affect: Mood normal.         Assessment/Plan   Problem List Items  Addressed This Visit    None    Problem List Items Addressed This Visit       Arthritis of left hip    Current Assessment & Plan     Dr Lester gives her an injection every 3 months         Diabetes type 2, controlled (Multi)    Relevant Medications    metFORMIN  mg 24 hr tablet    Diabetic peripheral neuropathy (Multi)    Fatty liver    Relevant Orders    Fibroscan (Liver Elastography) GI    Cervicogenic headache    Relevant Medications    tiZANidine (Zanaflex) 2 mg tablet    Hyperlipidemia    Relevant Orders    Comprehensive Metabolic Panel (Completed)    Lipid Panel (Completed)    Thyroid Stimulating Hormone (Completed)    Hypertension    EMERY on CPAP    Current Assessment & Plan     Uses CPAP and oxygen at night         Relevant Orders    CBC (Completed)    RESOLVED: Thyroid nodule    Enlarged lymph node in neck    Relevant Orders    US neck    RESOLVED: Dizziness of unknown cause    Class 2 severe obesity due to excess calories with serious comorbidity and body mass index (BMI) of 36.0 to 36.9 in adult (Multi)    Type 2 diabetes mellitus with hyperglycemia (Multi)    Relevant Orders    Albumin , Urine Random (Completed)    Hemoglobin A1C (Completed)    Family history of colonic polyps    Cervical radiculopathy - Primary    Relevant Medications    gabapentin (Neurontin) 300 mg capsule     Other Visit Diagnoses       Screening mammogram for breast cancer        Relevant Orders    BI mammo bilateral screening tomosynthesis

## 2024-04-27 DIAGNOSIS — K21.9 GASTROESOPHAGEAL REFLUX DISEASE, UNSPECIFIED WHETHER ESOPHAGITIS PRESENT: ICD-10-CM

## 2024-04-30 RX ORDER — OMEPRAZOLE 40 MG/1
40 CAPSULE, DELAYED RELEASE ORAL DAILY
Qty: 90 CAPSULE | Refills: 0 | Status: SHIPPED | OUTPATIENT
Start: 2024-04-30

## 2024-05-01 ENCOUNTER — APPOINTMENT (OUTPATIENT)
Dept: PULMONOLOGY | Facility: CLINIC | Age: 74
End: 2024-05-01
Payer: MEDICARE

## 2024-05-01 ENCOUNTER — HOSPITAL ENCOUNTER (OUTPATIENT)
Dept: RADIOLOGY | Facility: CLINIC | Age: 74
Discharge: HOME | End: 2024-05-01
Payer: MEDICARE

## 2024-05-01 VITALS — HEIGHT: 60 IN | WEIGHT: 175.93 LBS | BODY MASS INDEX: 34.54 KG/M2

## 2024-05-01 DIAGNOSIS — R59.0 ENLARGED LYMPH NODE IN NECK: ICD-10-CM

## 2024-05-01 DIAGNOSIS — Z12.31 SCREENING MAMMOGRAM FOR BREAST CANCER: ICD-10-CM

## 2024-05-01 PROCEDURE — 76536 US EXAM OF HEAD AND NECK: CPT

## 2024-05-01 PROCEDURE — 77067 SCR MAMMO BI INCL CAD: CPT | Performed by: RADIOLOGY

## 2024-05-01 PROCEDURE — 77067 SCR MAMMO BI INCL CAD: CPT

## 2024-05-01 PROCEDURE — 76536 US EXAM OF HEAD AND NECK: CPT | Performed by: RADIOLOGY

## 2024-05-01 PROCEDURE — 77063 BREAST TOMOSYNTHESIS BI: CPT | Performed by: RADIOLOGY

## 2024-05-10 ENCOUNTER — TELEPHONE (OUTPATIENT)
Dept: PHARMACY | Facility: HOSPITAL | Age: 74
End: 2024-05-10
Payer: MEDICARE

## 2024-05-10 NOTE — TELEPHONE ENCOUNTER
Population Health: Outreach by Ambulatory Pharmacy Team    Payor: [employer] [place]  Reason: Adherence  Medication: Losartan 50mg  Outcome: Left Voicemail    Mica Beltran, PharmD

## 2024-05-13 ENCOUNTER — CLINICAL SUPPORT (OUTPATIENT)
Dept: GASTROENTEROLOGY | Facility: HOSPITAL | Age: 74
End: 2024-05-13
Payer: MEDICARE

## 2024-05-13 DIAGNOSIS — K76.0 FATTY LIVER: ICD-10-CM

## 2024-05-13 PROCEDURE — 91200 LIVER ELASTOGRAPHY: CPT | Performed by: INTERNAL MEDICINE

## 2024-05-13 NOTE — LETTER
May 17, 2024     Ashtyn Capps MD  [address]  [address]  [facility] [address]    Patient: Carolian Martinez   YOB: 1950   Date of Visit: 5/13/2024       Dear Dr. Ashtyn Capps MD:    Thank you for referring Carolina Martinez to me for evaluation. Below are my notes for this consultation.  If you have questions, please do not hesitate to call me. I look forward to following your patient along with you.       Sincerely,     [id number]      CC: No Recipients  ______________________________________________________________________________________      Results:  E (median liver stiffness measurement):  4.8  kPa  CAP (controlled attenuation parameter):  229  dB/m    Interpretation:  This was a technically adequate study. The Fibrosis score is consistent with Metavir F0. The CAP score is consistent with 0 - 5 % hepatocyte steatosis (steatosis stage 0 of 3 ) .    Eduardo Joyner MD  Hepatology

## 2024-05-17 ENCOUNTER — EVALUATION (OUTPATIENT)
Dept: PHYSICAL THERAPY | Facility: CLINIC | Age: 74
End: 2024-05-17
Payer: MEDICARE

## 2024-05-17 DIAGNOSIS — G89.29 NECK PAIN, CHRONIC: Primary | ICD-10-CM

## 2024-05-17 DIAGNOSIS — M54.2 NECK PAIN, CHRONIC: Primary | ICD-10-CM

## 2024-05-17 DIAGNOSIS — M54.12 CERVICAL RADICULOPATHY: ICD-10-CM

## 2024-05-17 PROCEDURE — 97110 THERAPEUTIC EXERCISES: CPT | Mod: GP | Performed by: PHYSICAL THERAPIST

## 2024-05-17 PROCEDURE — 97161 PT EVAL LOW COMPLEX 20 MIN: CPT | Mod: GP | Performed by: PHYSICAL THERAPIST

## 2024-05-17 ASSESSMENT — ENCOUNTER SYMPTOMS
OCCASIONAL FEELINGS OF UNSTEADINESS: 0
DEPRESSION: 0
LOSS OF SENSATION IN FEET: 0

## 2024-05-17 NOTE — PROGRESS NOTES
Physical Therapy    Physical Therapy Evaluation and Treatment      Patient Name: Carolina Martinez  MRN: [id number]  Today's Date: 5/17/2024  Time Calculation  Start Time: 0815  Stop Time: 0900  Time Calculation (min): 45 min  PT Evaluation Time Entry  PT Evaluation (Low) Time Entry: 30  PT Therapeutic Procedures Time Entry  Therapeutic Exercise Time Entry: 10     Insurance: [employer]  Certification: 5/17/24 - 8/15/24   PT visit limit: Medical necessity  Visit #1    Assessment:  Carolina is a 73 y.o. R hand dominant female presenting with B neck and shoulder/arm pain. Exam shows increased tone and muscle tension in upper trapezius/levators, decreased strength in scapular stabilizers and 3/4 positive tests in cervical radiculopathy cluster. She is a good candidate for skilled PT to address these impairments and reduce pain to improve ADLs.    Plan:  OP PT Plan  Treatment/Interventions: Cryotherapy, Dry needling, Education/ Instruction, Hot pack, Manual therapy, Mechanical traction, Therapeutic exercises  PT Plan: Skilled PT  PT Frequency: 1 time per week  Duration: 8 weeks  Onset Date: 04/01/24  Certification Period Start Date: 05/17/24  Certification Period End Date: 08/15/24  Number of Treatments Authorized: Medical necessity  Rehab Potential: Good  Plan of Care Agreement: Patient    Current Problem:   1. Neck pain, chronic  Follow Up In Physical Therapy      2. Cervical radiculopathy  Referral to Physical Therapy    Follow Up In Physical Therapy        General  Reason for Referral: Cervical radiculopathy  Referred By: Dr. Arias  Preferred Learning Style: verbal, visual, written    Subjective    Carolina presents with c/o B neck and radiating pain into arms. She has had chronic neck pain for dating back to 2019 and had acute exacerbation 1 1/2 months ago without specific incident. She went to the ED on 4/2/24 secondary to severe pain in R shoulder and arm. She had CT which showed disc herniations and degenerative  "changes with neuroforaminal narrowing. She states today L shoulder and arm symptoms are worse than R. She occasionally experiences n/t into 5th fingers. “My pain is mainly on the L side but sometimes shifts over to the R. It's bad in the L right now.” She has been taking Gabapentin and Tylenol and using heating pad which helps. She denies any dizziness or nausea. She endorses headaches \"seldomly.\" She is limited in lifting, otherwise states \"I can do just about anything but I have to take my time.\" Pain is aggravated with cold air and better with her meds and heat. PMH: HTN, diabetes, COPD    Pt Goals: “Maybe I can do some things to help it feel better.”    Precautions:  Precautions  STEADI Fall Risk Score (The score of 4 or more indicates an increased risk of falling): 0  Pain:  Constant 8-9/10 currently, up to 10/10 “excruciating at times”    Prior Level of Function:  Independent in all ADLs.    Objective   Observation: moderate forward head and rounded shoulders; increased thoracic kyphosis; B scapular protraction    Palpation: TTP and increased tone in B upper trap and levator     Neck AROM (R/L in degrees):   Flexion- 50  Extension- 35  Lateral flexion- 35 / 35  Rotation- 60 / 62    Shoulder AROM symmetrical and WFL    Dermatomes symmetrical and WNL    Myotomes symmetrical and WNL    Shoulder MMT (R/L):   Flexion- 5/5; 5/5  Abduction- 5/5; 5/5  ER- 4+/5; 4+/5  IR- 4+/5; 4+/5  Serratus anterior- 3-/5; 3-/5    Special tests:   (+) Cervical Distraction  (+) Spurling's  (+) ULTT-A    Outcome Measures:  Neck Disability Index: 30 = 60%    Treatments:  Therapeutic Exercise:   Supine chin tucks x10 w/ 3 sec hold  Supine yellow band horizontal abd/ER x10 ea   Seated scap retractions x10 w/ 3 sec hold    EDUCATION:  Issued/reviewed HEP to be performed 1-2x/day.    Goals:  Active       PT Problem       Increase B serratus anterior MMT to at least 4/5.       Start:  05/17/24    Expected End:  07/31/24            Pt will " report at least 75% decrease in B neck and shoulder/arm symptoms to improve ADLs.       Start:  05/17/24    Expected End:  07/31/24            Improve NDI score by at least 10 points.       Start:  05/17/24    Expected End:  07/31/24            Pt will demonstrate improved postural awareness to reduce stress on neck and shoulder while sitting and standing.       Start:  05/17/24    Expected End:  07/31/24            Pt will demonstrate independence with HEP.       Start:  05/17/24    Expected End:  07/31/24

## 2024-05-17 NOTE — PROGRESS NOTES
Results:  E (median liver stiffness measurement):  4.8  kPa  CAP (controlled attenuation parameter):  229  dB/m    Interpretation:  This was a technically adequate study. The Fibrosis score is consistent with Metavir F0. The CAP score is consistent with 0 - 5 % hepatocyte steatosis (steatosis stage 0 of 3 ) .    Eduardo Joyner MD  Hepatology

## 2024-05-22 NOTE — PROGRESS NOTES
FUV for diabetes. LV with me 2023.    History of Present Illness   73 y.o. female with hx of type 2 diabetes, obesity class III, EMERY, HTN, and HLD.     Dx:   HbA1c: 5.8% (2024), 6.1% (2024), 6.0% (2023), 5.9%(2023), 6.0% (2023), 6.8% (2022), 6.6% (2022), 6.5% (2021)  Current regimen: metformin  mg Qevening, Ozempic 2 mg/week  Past medications: glipizide, Trulicity (injection site rash)  Complications: neuropathy  Comorbidities: obesity, HTN, HLD, EMERY     SMBG: once a day (fasting)  Fastins-120s     Hypoglycemia: no     Diet: dinner is biggest meal  Grazes during the day on cookies, peanuts, pieces of chicken  Dinner (*biggest meal): subway/corned beef sandwich     Exercise: nothing regular    ROS  General: no fever or chills  CV: no chest pain   Respiratory: no shortness of breath  MSK: no lower extremity edema  Neuro: no headache or dizziness  See HPI for Endocrine ROS    Past Medical History:   Diagnosis Date    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    Carotidynia    Other specified soft tissue disorders 09/15/2021    Calf swelling    Personal history of diseases of the blood and blood-forming organs and certain disorders involving the immune mechanism 2021    History of anemia    Personal history of other diseases of the circulatory system 2021    History of intermittent claudication    Personal history of other diseases of the respiratory system 2022    History of chronic obstructive lung disease    Personal history of other specified conditions 2022    History of dizziness    Shortness of breath 2021    SOB (shortness of breath) on exertion       No past surgical history on file.    Social History     Socioeconomic History    Marital status:      Spouse name: Not on file    Number of children: Not on file    Years of education: Not on file    Highest education level: Not on file   Occupational History    Not on file   Tobacco  Use    Smoking status: Former     Current packs/day: 0.00     Average packs/day: 1 pack/day for 33.0 years (33.0 ttl pk-yrs)     Types: Cigarettes     Start date:      Quit date: 2006     Years since quittin.4    Smokeless tobacco: Never   Substance and Sexual Activity    Alcohol use: Yes     Alcohol/week: 1.0 standard drink of alcohol     Types: 1 Glasses of wine per week    Drug use: Never    Sexual activity: Not on file   Other Topics Concern    Not on file   Social History Narrative    Not on file     Social Determinants of Health     Financial Resource Strain: Not on file   Food Insecurity: Not on file   Transportation Needs: Not on file   Physical Activity: Not on file   Stress: Not on file   Social Connections: Not on file   Intimate Partner Violence: Not on file   Housing Stability: Not on file       Physical Exam   height is 1.524 m (5') and weight is 78.9 kg (174 lb). Her tympanic temperature is 36.7 °C (98.1 °F). Her blood pressure is 127/73 and her pulse is 88. Her respiration is 22.   General: not in acute distress  HEENT: ARMAND TSE  Thyroid: no goiter  Neuro: alert and oriented x 3    Current Outpatient Medications   Medication Sig Dispense Refill    ascorbic acid (Vitamin C) 1,000 mg tablet Take 1 tablet (1,000 mg) by mouth once daily.      aspirin 81 mg EC tablet Take 1 tablet (81 mg) by mouth once daily.      blood pressure monitor kit Check BP daily 1 kit 0    budesonide-glycopyr-formoterol (Breztri Aerosphere) 160-9-4.8 mcg/actuation HFA aerosol inhaler Inhale 2 puffs 2 times a day. 10.7 g 3    cholecalciferol (Vitamin D-3) 50 MCG (2000 UT) tablet Take by mouth.      fluticasone (Flonase) 50 mcg/actuation nasal spray Administer 2 sprays into each nostril once daily. 16 g 2    gabapentin (Neurontin) 300 mg capsule Take 1 capsule (300 mg) by mouth 3 times a day. 90 capsule 5    hydroCHLOROthiazide (HYDRODiuril) 25 mg tablet Take 1 tablet (25 mg) by mouth once daily. 90 tablet 1    lancets  33 gauge misc Use As Directed twice daily      lidocaine (Xylocaine) 5 % ointment       loratadine (Claritin) 10 mg tablet Take 1 tablet (10 mg) by mouth once daily.      losartan (Cozaar) 50 mg tablet TAKE TWO TABLETS BY MOUTH ONCE DAILY 30 tablet 0    metFORMIN  mg 24 hr tablet Take 1 tablet (500 mg) by mouth once daily in the evening. Take with meals. Do not crush, chew, or split. 90 tablet 1    methocarbamol (Robaxin) 500 mg tablet Take 1 tablet (500 mg) by mouth every 12 hours if needed for muscle spasms (May cause sleepiness). May cause sleepiness 20 tablet 0    metoprolol tartrate (Lopressor) 25 mg tablet TAKE 1 TABLET BY MOUTH TWICE A  tablet 0    omeprazole (PriLOSEC) 40 mg DR capsule TAKE ONE CAPSULE BY MOUTH ONCE DAILY 90 capsule 0    OneTouch Ultra Test strip USE 1 STRIP --test daily; type 2 diabetes 100 strip 2    Ozempic 2 mg/dose (8 mg/3 mL) pen injector Inject 2 mg under the skin 1 (one) time per week.      potassium chloride CR 10 mEq ER tablet Take one a day 90 tablet 1    rosuvastatin (Crestor) 20 mg tablet TAKE 1 TABLET BY MOUTH ONCE DAILY 90 tablet 0    tiZANidine (Zanaflex) 2 mg tablet Take one  or two at bedtime 60 tablet 1    vitamin E 90 mg (200 unit) capsule Take 1 tablet by mouth once daily.      vitamin-B complex split tablet Take 1 tablet (2 half tablet) by mouth once daily.      albuterol 90 mcg/actuation inhaler Inhale 2 puffs every 4 hours if needed for wheezing. 18 g 0    blood pressure test kit (BLOOD PRESSURE MONITOR MISC) USE AS DIRECTED  BP arm cuff with digital readout      buPROPion SR (Wellbutrin SR) 150 mg 12 hr tablet Take 1 tablet (150 mg) by mouth 2 times a day.       No current facility-administered medications for this visit.       Assessment and Plan  73 y.o. female with hx of type 2 diabetes, obesity class III, EMERY, HTN, and HLD, here for management of diabetes and obesity.     1. Type 2 diabetes mellitus, controlled  2. Class III obesity  HbA1c: 6.1%  (04/2024), 6.0% 11/29/2023), 5.9% (7/18/2023), 6.0% (03/2023), 6.8% (9/7/2022), 6.6% (5/17/2022), 6.5% (09/2021)  Current regimen: metformin 500 mg Q evening, Ozempic 2 mg/week  Eye exam: no DR per patient (10/2023)  Urine microalbumin: neg (04/2024)  Podiatry: LV 04/2024  Lipids: HDL NA, LDL 59, TG 95 (04/2024) on rosuvastatin 20 mg     A1c: 5.8% today.  Continues to do very well.  Has lost 40 lbs since starting Ozempic in 12/2022. She is happy with this.    Provided patient with [employer] forms today for re-enrollment.  She prefers to  Ozempic at the main campus. She will drop off her forms there as well.  Prefers to be seen at [place].    PLAN:  -continue Ozempic 2 mg/week (enrolled in [employer] PAP-reapply in July 2024)  -continue metformin  mg Qevening  -check blood sugars once a day (alternate fasting and before dinner)    Follow-up in 6 months

## 2024-05-28 ENCOUNTER — OFFICE VISIT (OUTPATIENT)
Dept: ENDOCRINOLOGY | Facility: CLINIC | Age: 74
End: 2024-05-28
Payer: MEDICARE

## 2024-05-28 VITALS
HEART RATE: 88 BPM | WEIGHT: 174 LBS | SYSTOLIC BLOOD PRESSURE: 127 MMHG | DIASTOLIC BLOOD PRESSURE: 73 MMHG | TEMPERATURE: 98.1 F | HEIGHT: 60 IN | RESPIRATION RATE: 22 BRPM | BODY MASS INDEX: 34.16 KG/M2

## 2024-05-28 DIAGNOSIS — E11.65 TYPE 2 DIABETES MELLITUS WITH HYPERGLYCEMIA, WITHOUT LONG-TERM CURRENT USE OF INSULIN (MULTI): ICD-10-CM

## 2024-05-28 LAB — POC HEMOGLOBIN A1C: 5.8 % (ref 4.2–6.5)

## 2024-05-28 PROCEDURE — 3078F DIAST BP <80 MM HG: CPT | Performed by: STUDENT IN AN ORGANIZED HEALTH CARE EDUCATION/TRAINING PROGRAM

## 2024-05-28 PROCEDURE — 1157F ADVNC CARE PLAN IN RCRD: CPT | Performed by: STUDENT IN AN ORGANIZED HEALTH CARE EDUCATION/TRAINING PROGRAM

## 2024-05-28 PROCEDURE — 83036 HEMOGLOBIN GLYCOSYLATED A1C: CPT | Performed by: STUDENT IN AN ORGANIZED HEALTH CARE EDUCATION/TRAINING PROGRAM

## 2024-05-28 PROCEDURE — 1036F TOBACCO NON-USER: CPT | Performed by: STUDENT IN AN ORGANIZED HEALTH CARE EDUCATION/TRAINING PROGRAM

## 2024-05-28 PROCEDURE — 4010F ACE/ARB THERAPY RXD/TAKEN: CPT | Performed by: STUDENT IN AN ORGANIZED HEALTH CARE EDUCATION/TRAINING PROGRAM

## 2024-05-28 PROCEDURE — 3074F SYST BP LT 130 MM HG: CPT | Performed by: STUDENT IN AN ORGANIZED HEALTH CARE EDUCATION/TRAINING PROGRAM

## 2024-05-28 PROCEDURE — 1159F MED LIST DOCD IN RCRD: CPT | Performed by: STUDENT IN AN ORGANIZED HEALTH CARE EDUCATION/TRAINING PROGRAM

## 2024-05-28 PROCEDURE — 99214 OFFICE O/P EST MOD 30 MIN: CPT | Performed by: STUDENT IN AN ORGANIZED HEALTH CARE EDUCATION/TRAINING PROGRAM

## 2024-05-28 PROCEDURE — 3008F BODY MASS INDEX DOCD: CPT | Performed by: STUDENT IN AN ORGANIZED HEALTH CARE EDUCATION/TRAINING PROGRAM

## 2024-05-28 PROCEDURE — 3062F POS MACROALBUMINURIA REV: CPT | Performed by: STUDENT IN AN ORGANIZED HEALTH CARE EDUCATION/TRAINING PROGRAM

## 2024-05-30 ENCOUNTER — TREATMENT (OUTPATIENT)
Dept: PHYSICAL THERAPY | Facility: CLINIC | Age: 74
End: 2024-05-30
Payer: MEDICARE

## 2024-05-30 DIAGNOSIS — M54.12 CERVICAL RADICULOPATHY: ICD-10-CM

## 2024-05-30 DIAGNOSIS — M54.2 NECK PAIN, CHRONIC: Primary | ICD-10-CM

## 2024-05-30 DIAGNOSIS — G89.29 NECK PAIN, CHRONIC: Primary | ICD-10-CM

## 2024-05-30 PROCEDURE — 97140 MANUAL THERAPY 1/> REGIONS: CPT | Mod: GP | Performed by: PHYSICAL THERAPIST

## 2024-05-30 PROCEDURE — 97110 THERAPEUTIC EXERCISES: CPT | Mod: GP | Performed by: PHYSICAL THERAPIST

## 2024-05-30 NOTE — PROGRESS NOTES
"Physical Therapy    Physical Therapy Treatment    Patient Name: Carolina Martinez  MRN: [id number]  Today's Date: 5/30/2024  Time Calculation  Start Time: 1115  Stop Time: 1200  Time Calculation (min): 45 min     PT Therapeutic Procedures Time Entry  Manual Therapy Time Entry: 10  Therapeutic Exercise Time Entry: 30  Insurance: [facility] Medicare  Certification: 5/17/24 - 8/15/24   PT visit limit: Medical necessity  Visit #1    Assessment:  Shows good pain response to tx today. Needs mod verbal and tactile cues to maintain elbows extended during supine band horiz abd.    Plan:  Continue manual therapy and deep neck flexor/ scapular strengthening.    Current Problem  1. Neck pain, chronic  Follow Up In Physical Therapy      2. Cervical radiculopathy  Follow Up In Physical Therapy          Subjective    \"When I do the exercises it makes a difference. The cold air aggravates.\"    Pain  7/10 pre-tx, 4/10 post-tx    Objective   Treatments:  Manual Therapy:   Supine manual c-spine traction  Suboccipital release    Therapeutic Exercise:   Supine chin tucks x20 w/ 3 sec hold  Supine yellow band horizontal abd/ER 2x10 ea  Supine serratus press 1# 2x10   Seated scap retractions 2x10 w/ 3 sec hold  Serratus wall slides w/ towel 2x10    OP EDUCATION:  Updated HEP.    Goals:  Active       PT Problem       Increase B serratus anterior MMT to at least 4/5.       Start:  05/17/24    Expected End:  07/31/24            Pt will report at least 75% decrease in B neck and shoulder/arm symptoms to improve ADLs.       Start:  05/17/24    Expected End:  07/31/24            Improve NDI score by at least 10 points.       Start:  05/17/24    Expected End:  07/31/24            Pt will demonstrate improved postural awareness to reduce stress on neck and shoulder while sitting and standing.       Start:  05/17/24    Expected End:  07/31/24            Pt will demonstrate independence with HEP.       Start:  05/17/24    Expected End:  07/31/24          "

## 2024-06-04 ENCOUNTER — TREATMENT (OUTPATIENT)
Dept: PHYSICAL THERAPY | Facility: CLINIC | Age: 74
End: 2024-06-04
Payer: MEDICARE

## 2024-06-04 DIAGNOSIS — M54.12 CERVICAL RADICULOPATHY: ICD-10-CM

## 2024-06-04 DIAGNOSIS — M54.2 NECK PAIN, CHRONIC: ICD-10-CM

## 2024-06-04 DIAGNOSIS — G89.29 NECK PAIN, CHRONIC: ICD-10-CM

## 2024-06-04 PROCEDURE — 97110 THERAPEUTIC EXERCISES: CPT | Mod: GP,CQ

## 2024-06-04 PROCEDURE — 97140 MANUAL THERAPY 1/> REGIONS: CPT | Mod: GP,CQ

## 2024-06-04 NOTE — PROGRESS NOTES
"  Physical Therapy    Physical Therapy Treatment    Patient Name: Carolina Martinez  MRN: [id number]  Today's Date: 6/4/2024  Time Calculation  Start Time: 0755  Stop Time: 0845  Time Calculation (min): 50 min     PT Therapeutic Procedures Time Entry  Manual Therapy Time Entry: 15  Therapeutic Exercise Time Entry: 30  Insurance: [facility] Medicare  Certification: 5/17/24 - 8/15/24   PT visit limit: Medical necessity  Visit #3    Assessment:  Tenderness at L clavicle/anterior shoulder.  Pt was able to gently progress    Plan:  Continue manual therapy and deep neck flexor/ scapular strengthening.  Bent over rows and hor abd, lat pulls    Current Problem  1. Neck pain, chronic  Follow Up In Physical Therapy      2. Cervical radiculopathy  Follow Up In Physical Therapy          Subjective    \"Ex's OK, no pain so far this morning.\"\"    Pain  AROM c-spine rotation 51` R, 62` L, w/ bilat UT pain    Objective   Treatments:  Manual Therapy:   Sub occ release, distraction lat glides, scalenes/clavicle, L shoulder    Therapeutic Exercise:   Supine pec/biceps stretch x 3 min  Supine chin tucks x10 w/ 3 sec hold  DNF w/ towel lift off x 10  Supine red band horizontal abd/ER 2x10 ea  Supine serratus press 2# 2x10   Seated scap retractions 2x10 w/ 3 sec hold  Serratus wall slides w/ towel 2x10    OP EDUCATION:  Updated HEP.    Goals:  Active       PT Problem       Increase B serratus anterior MMT to at least 4/5.       Start:  05/17/24    Expected End:  07/31/24            Pt will report at least 75% decrease in B neck and shoulder/arm symptoms to improve ADLs.       Start:  05/17/24    Expected End:  07/31/24            Improve NDI score by at least 10 points.       Start:  05/17/24    Expected End:  07/31/24            Pt will demonstrate improved postural awareness to reduce stress on neck and shoulder while sitting and standing.       Start:  05/17/24    Expected End:  07/31/24            Pt will demonstrate independence with HEP.  "      Start:  05/17/24    Expected End:  07/31/24

## 2024-06-11 ENCOUNTER — TREATMENT (OUTPATIENT)
Dept: PHYSICAL THERAPY | Facility: CLINIC | Age: 74
End: 2024-06-11
Payer: MEDICARE

## 2024-06-11 DIAGNOSIS — G89.29 NECK PAIN, CHRONIC: ICD-10-CM

## 2024-06-11 DIAGNOSIS — M54.12 CERVICAL RADICULOPATHY: ICD-10-CM

## 2024-06-11 DIAGNOSIS — M54.2 NECK PAIN, CHRONIC: ICD-10-CM

## 2024-06-11 DIAGNOSIS — I10 HYPERTENSION, UNSPECIFIED TYPE: ICD-10-CM

## 2024-06-11 PROCEDURE — 97140 MANUAL THERAPY 1/> REGIONS: CPT | Mod: GP,CQ

## 2024-06-11 PROCEDURE — 97110 THERAPEUTIC EXERCISES: CPT | Mod: GP,CQ

## 2024-06-11 NOTE — PROGRESS NOTES
"  Physical Therapy    Physical Therapy Treatment    Patient Name: Carolina Martinez  MRN: [id number]  Today's Date: 6/11/2024  Time Calculation  Start Time: 0800  Stop Time: 0850  Time Calculation (min): 50 min     PT Therapeutic Procedures Time Entry  Manual Therapy Time Entry: 15  Therapeutic Exercise Time Entry: 30  Insurance: [facility] Medicare  Certification: 5/17/24 - 8/15/24   PT visit limit: Medical necessity  Visit #4    Assessment:  VC's to increase posterior shoulder strength    Plan:  Continue manual therapy and deep neck flexor/ scapular strengthening.  Lat pulls, upright posture    Current Problem  1. Neck pain, chronic  Follow Up In Physical Therapy      2. Cervical radiculopathy  Follow Up In Physical Therapy          Subjective    \"Ex's OK, a little bilat UT pain today.\"    Pain  AROM c-spine rotation 58` R, 57` L    Objective   Treatments:  Manual Therapy:   Sub occ release, distraction lat gliraoul scalenes    Therapeutic Exercise:   Supine pec/biceps stretch x 3 min  Supine chin tucks x10 w/ 3 sec hold  DNF w/ towel lift off x 10  Supine red band horizontal abd/ER 2x10 ea  Supine serratus press 2# 2x10   Serratus wall slides w/ towel 2x10  Red band rows x 20 ea  Bent over hor abd, rows w/ #2 x 20 ea    OP EDUCATION:  Updated HEP.    Goals:  Active       PT Problem       Increase B serratus anterior MMT to at least 4/5.       Start:  05/17/24    Expected End:  07/31/24            Pt will report at least 75% decrease in B neck and shoulder/arm symptoms to improve ADLs.       Start:  05/17/24    Expected End:  07/31/24            Improve NDI score by at least 10 points.       Start:  05/17/24    Expected End:  07/31/24            Pt will demonstrate improved postural awareness to reduce stress on neck and shoulder while sitting and standing.       Start:  05/17/24    Expected End:  07/31/24            Pt will demonstrate independence with HEP.       Start:  05/17/24    Expected End:  07/31/24              "

## 2024-06-12 DIAGNOSIS — I10 HYPERTENSION, UNSPECIFIED TYPE: Primary | ICD-10-CM

## 2024-06-12 RX ORDER — ROSUVASTATIN CALCIUM 20 MG/1
20 TABLET, COATED ORAL DAILY
Qty: 90 TABLET | Refills: 0 | Status: SHIPPED | OUTPATIENT
Start: 2024-06-12

## 2024-06-12 RX ORDER — LOSARTAN POTASSIUM 50 MG/1
100 TABLET ORAL DAILY
Qty: 30 TABLET | Refills: 0 | OUTPATIENT
Start: 2024-06-12

## 2024-06-12 RX ORDER — METOPROLOL TARTRATE 25 MG/1
25 TABLET, FILM COATED ORAL 2 TIMES DAILY
Qty: 180 TABLET | Refills: 0 | Status: SHIPPED | OUTPATIENT
Start: 2024-06-12

## 2024-06-12 RX ORDER — HYDROCHLOROTHIAZIDE 25 MG/1
25 TABLET ORAL DAILY
Qty: 90 TABLET | Refills: 0 | Status: SHIPPED | OUTPATIENT
Start: 2024-06-12

## 2024-06-12 RX ORDER — LOSARTAN POTASSIUM 100 MG/1
100 TABLET ORAL DAILY
Qty: 90 TABLET | Refills: 0 | Status: SHIPPED | OUTPATIENT
Start: 2024-06-12 | End: 2025-06-12

## 2024-06-18 ENCOUNTER — HOSPITAL ENCOUNTER (OUTPATIENT)
Dept: RADIOLOGY | Facility: EXTERNAL LOCATION | Age: 74
Discharge: HOME | End: 2024-06-18

## 2024-06-18 ENCOUNTER — APPOINTMENT (OUTPATIENT)
Dept: ORTHOPEDIC SURGERY | Facility: CLINIC | Age: 74
End: 2024-06-18
Payer: MEDICARE

## 2024-06-18 DIAGNOSIS — M16.12 ARTHRITIS OF LEFT HIP: Primary | ICD-10-CM

## 2024-06-18 PROCEDURE — 1157F ADVNC CARE PLAN IN RCRD: CPT | Performed by: FAMILY MEDICINE

## 2024-06-18 PROCEDURE — 3062F POS MACROALBUMINURIA REV: CPT | Performed by: FAMILY MEDICINE

## 2024-06-18 PROCEDURE — 4010F ACE/ARB THERAPY RXD/TAKEN: CPT | Performed by: FAMILY MEDICINE

## 2024-06-18 PROCEDURE — 1159F MED LIST DOCD IN RCRD: CPT | Performed by: FAMILY MEDICINE

## 2024-06-18 PROCEDURE — 20611 DRAIN/INJ JOINT/BURSA W/US: CPT | Performed by: FAMILY MEDICINE

## 2024-06-18 PROCEDURE — 3008F BODY MASS INDEX DOCD: CPT | Performed by: FAMILY MEDICINE

## 2024-06-18 PROCEDURE — 99213 OFFICE O/P EST LOW 20 MIN: CPT | Performed by: FAMILY MEDICINE

## 2024-06-18 PROCEDURE — 1036F TOBACCO NON-USER: CPT | Performed by: FAMILY MEDICINE

## 2024-06-18 RX ORDER — TRIAMCINOLONE ACETONIDE 40 MG/ML
80 INJECTION, SUSPENSION INTRA-ARTICULAR; INTRAMUSCULAR
Status: COMPLETED | OUTPATIENT
Start: 2024-06-18 | End: 2024-06-18

## 2024-06-18 RX ORDER — LIDOCAINE HYDROCHLORIDE 10 MG/ML
4 INJECTION INFILTRATION; PERINEURAL
Status: COMPLETED | OUTPATIENT
Start: 2024-06-18 | End: 2024-06-18

## 2024-06-18 NOTE — PROGRESS NOTES
** Please excuse any errors in grammar or translation related to this dictation. Voice recognition software was utilized to prepare this document. **    Assessment & Plan:  Patient here for ongoing management of left hip arthritis.  Did well following last CSI in March. Given previous positive response to steroid injection, offered to repeat today which patient was agreeable to have completed. Can have this repeated in 3 or more months as dictated by symptoms. Continue otc Tylenol as needed. If symptoms progressing, next option would be for referral to surgeon to discuss arthroplasty. All questions answered and patient agrees to this plan.       Chief complaint: L hip pain    HPI:  6/18/24: Patient reports injection from 3/12 provided relief up until the last week.  Denies any new injuries.  Takes tylenol or ibuprofen as needed.     3/12/24:  Patient reports injection on 12/12/23 provided 3 months of relief. She only started having pain the last few days.  Denies any new injuries. Notes that she is using Ozempic for weight loss and that this has helped lessen the severity of her symptoms.  Will take Tylenol or naproxen intermittently for breakthrough symptoms.     12/12/23:  Patient reports injection on 9/12 provided close to the 3 months relief. Occasional use of Tylenol if needed for breakthrough pain but this is infrequent.   Denies any new injuries.    9/12/23: F/u for left hip arthritis management. Patient reports injection on 6/15 provided close to 3 months relief. No reported progression in symptoms. She is requesting another injection.      6/15/23: Follow-up for left hip pain secondary to arthritis. Reports that steroid injection helped provide pain relief for nearly 3 months. Physical therapy has also been helping with her strengthening. States her therapist told her her range of motion of her hip is also been improving.      3/15/23: 73 y/o F, hx of obesity, DM and HTN, presents with left hip pain. Pain  has been intermittently occurring for the past 4-5 years. Most recent pain has been continuous for 4 weeks. Pain is present in lateral hip and into groin. Pain has reduced her ROM. Pain increases with activity along with quick changes in weather temperature. Has been managing with otc analgesics Tylenol and Aleve which do not help at all. No previous injections or therapy for hip.    Exam:  No erythema, warmth or ecchymosis overlying injection site of left hip joint.   Limited hip flexion and IR.  + FADIR.    General Exam:  Constitutional - NAD, AAO x 3, conversing appropriately.  HEENT- Normocephalic and atraumatic. EOMI, PERRLA, No scleral icterus. No facial deformities. Hearing grossly normal.  Lungs - Breathing non-labored with normal rate. No accessory muscle use.  CV - Extremities warm and well-perfused, brisk capillary refill present.   Neuro - CN II-XII grossly intact.    Results:  3/15/23: Xrays of L hip demonstrate moderate degenerative changes.     4/23/24 HbA1c 6.1%     Procedure:  Patient ID: Carolina Martinez is a 73 y.o. female.    L Inj/Asp: L hip joint on 6/18/2024 7:51 AM  Indications: pain  Details: 22 G needle, ultrasound-guided anterior approach  Medications: 80 mg triamcinolone acetonide 40 mg/mL; 4 mL lidocaine 10 mg/mL (1 %)  Outcome: tolerated well, no immediate complications    Procedure risk factors to include increased pain, bleeding, infection, neurovascular injury, soft tissue injury, transient elevation of blood glucose and blood pressure, and adverse reaction to medication were discussed with the patient. Patient understands there is a moderate risk of morbidity from undergoing the procedure.    Procedure, treatment alternatives, risks and benefits explained, specific risks discussed. Consent was given by the patient. Immediately prior to procedure a time out was called to verify the correct patient, procedure, equipment, support staff and site/side marked as required. Patient was  prepped and draped in the usual sterile fashion.

## 2024-06-20 ENCOUNTER — OFFICE VISIT (OUTPATIENT)
Dept: PULMONOLOGY | Facility: HOSPITAL | Age: 74
End: 2024-06-20
Payer: MEDICARE

## 2024-06-20 ENCOUNTER — APPOINTMENT (OUTPATIENT)
Dept: PHYSICAL THERAPY | Facility: CLINIC | Age: 74
End: 2024-06-20
Payer: MEDICARE

## 2024-06-20 VITALS
BODY MASS INDEX: 34.76 KG/M2 | TEMPERATURE: 96.7 F | SYSTOLIC BLOOD PRESSURE: 130 MMHG | WEIGHT: 178 LBS | OXYGEN SATURATION: 99 % | DIASTOLIC BLOOD PRESSURE: 76 MMHG | HEART RATE: 77 BPM

## 2024-06-20 DIAGNOSIS — R06.09 DYSPNEA ON EXERTION: Primary | ICD-10-CM

## 2024-06-20 DIAGNOSIS — R91.8 MULTIPLE LUNG NODULES ON CT: ICD-10-CM

## 2024-06-20 DIAGNOSIS — J30.9 ALLERGIC RHINITIS, UNSPECIFIED SEASONALITY, UNSPECIFIED TRIGGER: ICD-10-CM

## 2024-06-20 PROCEDURE — 3062F POS MACROALBUMINURIA REV: CPT | Performed by: NURSE PRACTITIONER

## 2024-06-20 PROCEDURE — 3078F DIAST BP <80 MM HG: CPT | Performed by: NURSE PRACTITIONER

## 2024-06-20 PROCEDURE — 1036F TOBACCO NON-USER: CPT | Performed by: NURSE PRACTITIONER

## 2024-06-20 PROCEDURE — 4010F ACE/ARB THERAPY RXD/TAKEN: CPT | Performed by: NURSE PRACTITIONER

## 2024-06-20 PROCEDURE — 1157F ADVNC CARE PLAN IN RCRD: CPT | Performed by: NURSE PRACTITIONER

## 2024-06-20 PROCEDURE — 99214 OFFICE O/P EST MOD 30 MIN: CPT | Performed by: NURSE PRACTITIONER

## 2024-06-20 PROCEDURE — 3075F SYST BP GE 130 - 139MM HG: CPT | Performed by: NURSE PRACTITIONER

## 2024-06-20 PROCEDURE — 1159F MED LIST DOCD IN RCRD: CPT | Performed by: NURSE PRACTITIONER

## 2024-06-20 PROCEDURE — 1126F AMNT PAIN NOTED NONE PRSNT: CPT | Performed by: NURSE PRACTITIONER

## 2024-06-20 PROCEDURE — 3008F BODY MASS INDEX DOCD: CPT | Performed by: NURSE PRACTITIONER

## 2024-06-20 RX ORDER — ALBUTEROL SULFATE 90 UG/1
2 AEROSOL, METERED RESPIRATORY (INHALATION) EVERY 4 HOURS PRN
Qty: 8 G | Refills: 5 | Status: SHIPPED | OUTPATIENT
Start: 2024-06-20 | End: 2025-06-20

## 2024-06-20 RX ORDER — FLUTICASONE PROPIONATE 50 MCG
2 SPRAY, SUSPENSION (ML) NASAL DAILY
Qty: 16 G | Refills: 11 | Status: SHIPPED | OUTPATIENT
Start: 2024-06-20

## 2024-06-20 SDOH — ECONOMIC STABILITY: FOOD INSECURITY: WITHIN THE PAST 12 MONTHS, YOU WORRIED THAT YOUR FOOD WOULD RUN OUT BEFORE YOU GOT MONEY TO BUY MORE.: NEVER TRUE

## 2024-06-20 SDOH — ECONOMIC STABILITY: FOOD INSECURITY: WITHIN THE PAST 12 MONTHS, THE FOOD YOU BOUGHT JUST DIDN'T LAST AND YOU DIDN'T HAVE MONEY TO GET MORE.: NEVER TRUE

## 2024-06-20 ASSESSMENT — ENCOUNTER SYMPTOMS
MYALGIAS: 0
DIARRHEA: 0
FEVER: 0
NUMBNESS: 0
VOMITING: 0
HEADACHES: 0
PALPITATIONS: 0
DIZZINESS: 0
NECK PAIN: 1
JOINT SWELLING: 0
SINUS PRESSURE: 0
EYE PAIN: 0
NERVOUS/ANXIOUS: 0
NAUSEA: 0
BACK PAIN: 1
FATIGUE: 1
RHINORRHEA: 0
ABDOMINAL PAIN: 0
ARTHRALGIAS: 0
VOICE CHANGE: 0
AGITATION: 0
WEAKNESS: 0

## 2024-06-20 ASSESSMENT — LIFESTYLE VARIABLES
HOW MANY STANDARD DRINKS CONTAINING ALCOHOL DO YOU HAVE ON A TYPICAL DAY: 1 OR 2
SKIP TO QUESTIONS 9-10: 0
HOW OFTEN DO YOU HAVE SIX OR MORE DRINKS ON ONE OCCASION: LESS THAN MONTHLY
AUDIT-C TOTAL SCORE: 2
HOW OFTEN DO YOU HAVE A DRINK CONTAINING ALCOHOL: MONTHLY OR LESS

## 2024-06-20 ASSESSMENT — PATIENT HEALTH QUESTIONNAIRE - PHQ9
SUM OF ALL RESPONSES TO PHQ9 QUESTIONS 1 & 2: 0
1. LITTLE INTEREST OR PLEASURE IN DOING THINGS: NOT AT ALL
2. FEELING DOWN, DEPRESSED OR HOPELESS: NOT AT ALL

## 2024-06-20 ASSESSMENT — PAIN SCALES - GENERAL: PAINLEVEL: 0-NO PAIN

## 2024-06-20 NOTE — PROGRESS NOTES
Patient: Carolina Martinez    [id number]  : 1950 -- AGE 73 y.o.    Provider: AN Bryson-CNP     Location [facility]   Service Date: 2024              [facility]  Follow up visit note      HISTORY OF PRESENT ILLNESS       HISTORY OF PRESENT ILLNESS     On today's visit, the patient reports she rarely needs to use inhalers. She has a sample of breztri with her, but does not remember the last time she used it.   She has small episodes of her left nostril getting blocked. States if she blows her nose - then it clears. She states she has a rare dry cough - more of a tickle in her throat.  She rarely notices wheezing. She was told in the ED a year or two ago that she has chronic bronchitis. She sometimes will notice OVERTON going up the stairs -- has lost weight and she feels this has helped. She has been on ozempic and she has found it helpful. She denies any SOB at rest or CP. She feels her GERD is under good control on daily omeprazole.  She is using flonase daily for allergies with good control. She is currently out of her flonase - has more congestion since.     Dr. José - 23   Interval HPI:  Doing well  Breathing is well  Currently on ozempic for diabetes.   Interval labs and radiology:  CT scan of the chest 23 - stable lung nodules 2- 6 mm, unchanged from .  --  72-year-old AA female, ex smoker (30-pack year smoker, quit in ), EMERY on CPAP, HTN, hyperlipidemia, DM2 (on metformin), h/o COVID in Dec 2020, carotid artery wall thickening (post covid on prednisone) here for evaluation of respiratory symptoms.  Work up so far:  PFTs 4/15/21 - FEV1 80%, %, DLCO 61%  Echo - 3/25/21 - impaired relax, normal LV and RV, RVSP 41  CT cardiac scoring - multiple lung nodules <6 mm  Nuclear stress test - 21 - normal  CT angio 3/4/2021 - resolution of lung opacities.  CT scan of the chest 2022 - stable lung nodules,  pleural nodules <6 mm.  PFT 4/22: FEV1 84%, TLC 86%, DLCO 61% (Stable)  3/25/22: 6MWD - 101% 358m  9/27/22: Repeat echo - EF 65%, diastology indeterminate, no valvular issues, RVSP 20.3  4/20/23: CT scan of the chest 4/20/23 - stable lung nodules 2- 6 mm, unchanged from 2022.    Sleep: EMERY on CPAP -- has oxygen bled in (not sure how much)     ALLERGIES AND MEDICATIONS     ALLERGIES  Allergies   Allergen Reactions    Codeine Hives, Itching and Rash    Ace Inhibitors Swelling and Other    Atorvastatin Other    Penicillin Unknown    Sulfa (Sulfonamide Antibiotics) Other and Unknown       MEDICATIONS  Current Outpatient Medications   Medication Sig Dispense Refill    albuterol 90 mcg/actuation inhaler Inhale 2 puffs every 4 hours if needed for wheezing. 18 g 0    ascorbic acid (Vitamin C) 1,000 mg tablet Take 1 tablet (1,000 mg) by mouth once daily.      aspirin 81 mg EC tablet Take 1 tablet (81 mg) by mouth once daily.      blood pressure monitor kit Check BP daily 1 kit 0    blood pressure test kit (BLOOD PRESSURE MONITOR MISC) USE AS DIRECTED  BP arm cuff with digital readout      cholecalciferol (Vitamin D-3) 50 MCG (2000 UT) tablet Take by mouth.      gabapentin (Neurontin) 300 mg capsule Take 1 capsule (300 mg) by mouth 3 times a day. 90 capsule 5    hydroCHLOROthiazide (HYDRODiuril) 25 mg tablet TAKE ONE TABLET BY MOUTH ONCE DAILY 90 tablet 0    lancets 33 gauge misc Use As Directed twice daily      lidocaine (Xylocaine) 5 % ointment       losartan (Cozaar) 100 mg tablet Take 1 tablet (100 mg) by mouth once daily. 90 tablet 0    metFORMIN  mg 24 hr tablet Take 1 tablet (500 mg) by mouth once daily in the evening. Take with meals. Do not crush, chew, or split. 90 tablet 1    methocarbamol (Robaxin) 500 mg tablet Take 1 tablet (500 mg) by mouth every 12 hours if needed for muscle spasms (May cause sleepiness). May cause sleepiness 20 tablet 0    metoprolol tartrate (Lopressor) 25 mg tablet TAKE ONE TABLET BY  MOUTH TWICE DAILY 180 tablet 0    omeprazole (PriLOSEC) 40 mg DR capsule TAKE ONE CAPSULE BY MOUTH ONCE DAILY 90 capsule 0    OneTouch Ultra Test strip USE 1 STRIP --test daily; type 2 diabetes 100 strip 2    Ozempic 2 mg/dose (8 mg/3 mL) pen injector Inject 2 mg under the skin 1 (one) time per week.      potassium chloride CR 10 mEq ER tablet Take one a day 90 tablet 1    rosuvastatin (Crestor) 20 mg tablet TAKE ONE TABLET BY MOUTH ONCE DAILY 90 tablet 0    tiZANidine (Zanaflex) 2 mg tablet Take one  or two at bedtime 60 tablet 1    vitamin E 90 mg (200 unit) capsule Take 1 tablet by mouth once daily.      vitamin-B complex split tablet Take 1 tablet (2 half tablet) by mouth once daily.      albuterol (Ventolin HFA) 90 mcg/actuation inhaler Inhale 2 puffs every 4 hours if needed for wheezing or shortness of breath. 8 g 5    buPROPion SR (Wellbutrin SR) 150 mg 12 hr tablet Take 1 tablet (150 mg) by mouth 2 times a day.      fluticasone (Flonase) 50 mcg/actuation nasal spray Administer 2 sprays into each nostril once daily. 16 g 11     No current facility-administered medications for this visit.         PAST HISTORY     PAST MEDICAL HISTORY  - EMERY - on CPAP   - HTN   - HLD   - DMII   - GERD   - cataracts     PAST SURGICAL HISTORY  No past surgical history on file.    IMMUNIZATION HISTORY  Immunization History   Administered Date(s) Administered    Flu vaccine, quadrivalent, high-dose, preservative free, age 65y+ (FLUZONE) 11/01/2022    Influenza, High Dose Seasonal, Preservative Free 11/08/2016, 11/13/2017, 10/08/2019    Influenza, Seasonal, Quadrivalent, Adjuvanted 09/15/2021, 11/07/2023    Influenza, seasonal, injectable 10/28/2010, 10/22/2013    Influenza, seasonal, injectable, preservative free 10/07/2014, 10/19/2015    Influenza, trivalent, adjuvanted 10/17/2018    Pfizer Purple Cap SARS-CoV-2 04/28/2021, 05/19/2021, 12/19/2021    Pneumococcal conjugate vaccine, 13-valent (PREVNAR 13) 11/07/2018    Pneumococcal  polysaccharide vaccine, 23-valent, age 2 years and older (PNEUMOVAX 23) 2010, 10/22/2013, 2017    RESPIRATORY SYNCYTIAL VIRUS (RSV), ELIGIBLE PREGNANT PTS, 0.5 ML (ABRYSVO) 2024    Zoster vaccine, recombinant, adult (SHINGRIX) 2022, 2022       SOCIAL HISTORY  Smokin-56 - 12 cigarettes ~24 pack years   Alcohol: one drink per week    Illicit drugs:  none     OCCUPATIONAL/ENVIRONMENTAL HISTORY  Retired -  previously did more manual labor jobs/ lifting. Some dust exposure     FAMILY HISTORY  Sister -  lung cancer and COPD    RESULTS/DATA     Pulmonary Function Test Results     3/31/10 - FEV1/FVC 0.83/ FEV1 1.13 (63%)/ FVC 1.48 (67%)/ TLC 3.59 (103%)/ DLCO 62%   3/25/22 - FEV1/ FVC 0.73/ FEV1 1.39 (84%)/ FVC 1.76 (84%)/ TLC 3.17 (86%)/ DLCO 61%     6MWT - 3/25/22 - 358m () 98-92% - NOE 0     Chest Radiograph     XR chest 1 view 2023  Impression  No acute cardiopulmonary process.      Chest CT Scan     20 - Patchy peripheral areas of septal thickening and ground-glass type infiltrates. Pneumonia including COVID pneumonia should be considered.    20 -Limited evaluation due to suboptimal timing of the contrast bolus. Within those limitations, no evidence of any central pulmonary embolism to the level of the patricia. 2. Significant interval improvement in bilateral patchy ground-glass opacities suggestive of resolving pneumonia when compared to recent chest CT from 2020.    3/4/21 - No acute pulmonary embolus to the segmental level.   No CT evidence for an acute intrathoracic process.    3/14/22 -  No acute cardiopulmonary process. 2. Numerous solid pulmonary nodules within the bilateral lungs measuring up to 0.6 cm which are stable when compared to exam dated 2021. If patient is high risk, may consider CT follow-up in 1 year to re-evaluate stability. 3. Stable appearance of a 1.4 cm nodular opacity within the right breast soft tissues. Correlate with  "mammography recommended 4. Moderate coronary artery calcifications. 5. Small sliding hiatal hernia.    4/20/23 - Multiple unchanged 2-6 mm pulmonary nodules, since 2022. No new nodules identified. No thoracic adenopathy.   A non-calcified pulmonary nodule/ multiple non-calcified pulmonary nodules measuring less than 6 mm, likely benign. No further follow-up is required, however, if the patient has high risk factors for primary lung malignancy, follow-up noncontrast CT scan chest in 12 months may be obtained.     Echocardiogram     3/25/21 -  The left ventricular systolic function is normal with a 55% estimated ejection fraction.   2. Spectral Doppler shows an impaired relaxation pattern of left ventricular diastolic filling.   3. TAPSE normal.   4. Mildly elevated RVSP.  RA normal size, RV normal size and function     9/27/22 -  Left ventricular systolic function is normal with a 55-60% estimated ejection fraction.   2. No significant valvular abnormalities. RA normal size, RV normal size and function     Labs/ Other testing      Eosinophils Absolute   Date Value   09/15/2022 0.10 K/UL   12/30/2021 0.09 x10E9/L   09/03/2021 0.03 x10E9/L     No results found for: \"IGE\"    REVIEW OF SYSTEMS     REVIEW OF SYSTEMS  Review of Systems   Constitutional:  Positive for fatigue. Negative for fever.   HENT:  Negative for congestion, postnasal drip, rhinorrhea, sinus pressure and voice change.    Eyes:  Negative for pain and visual disturbance.   Cardiovascular:  Negative for chest pain, palpitations and leg swelling.   Gastrointestinal:  Negative for abdominal pain, diarrhea, nausea and vomiting.   Endocrine: Negative for cold intolerance and heat intolerance.   Musculoskeletal:  Positive for back pain and neck pain. Negative for arthralgias, joint swelling and myalgias.   Skin:  Negative for rash.   Neurological:  Negative for dizziness, weakness, numbness and headaches.   Psychiatric/Behavioral:  Negative for agitation. The " patient is not nervous/anxious.          PHYSICAL EXAM     VITAL SIGNS: /76 (BP Location: Right arm, Patient Position: Sitting)   Pulse 77   Temp 35.9 °C (96.7 °F) (Temporal)   Wt 80.7 kg (178 lb)   LMP  (LMP Unknown)   SpO2 99% Comment: EDUARD  BMI 34.76 kg/m²      CURRENT WEIGHT: [unfilled]  BMI: [unfilled]  PREVIOUS WEIGHTS:  Wt Readings from Last 3 Encounters:   06/20/24 80.7 kg (178 lb)   05/28/24 78.9 kg (174 lb)   05/01/24 79.8 kg (175 lb 14.8 oz)       Physical Exam  Vitals reviewed.   Constitutional:       General: She is not in acute distress.     Appearance: Normal appearance. She is obese. She is not ill-appearing or toxic-appearing.   HENT:      Head: Normocephalic.      Nose: No rhinorrhea.   Cardiovascular:      Rate and Rhythm: Normal rate and regular rhythm.      Heart sounds: Normal heart sounds.   Pulmonary:      Effort: Pulmonary effort is normal. No respiratory distress.      Breath sounds: Normal breath sounds. No stridor. No wheezing, rhonchi or rales.   Abdominal:      General: Abdomen is flat.   Musculoskeletal:         General: Normal range of motion.      Right lower leg: No edema.      Left lower leg: No edema.   Skin:     General: Skin is warm and dry.      Nails: There is no clubbing.   Neurological:      General: No focal deficit present.      Mental Status: She is alert and oriented to person, place, and time.   Psychiatric:         Mood and Affect: Mood normal.         Behavior: Behavior normal.         Judgment: Judgment normal.         ASSESSMENT/PLAN     Dyspnea on exertion: PFTs in 2022 without obstruction - previously on trelegy, but stopped.   - start albuterol 2 puffs every 4-6 hours as needed     2. Allergic rhinitis:   - restart flonase 1 spray each nostril daily -- aim towards your ear     3. Pulmonary nodules: former smoker - > 15 years ago. Family hx of lung cancer. Majority of nodules stable from 2021 -- LLL nodules/atelectasis slightly increased in 4/2023.   - will  get CT chest     Thank you for visiting the Pulmonary clinic today!   Return to clinic 3 months after CT chest  or sooner if needed - can be virtual   Isabel Montoya CNP  My office -  [phone]- Cecy is my nurse.   Radiology scheduling [phone]   Appointment scheduling [phone]   Pulmonary function testing - [phone]

## 2024-06-27 ENCOUNTER — TELEPHONE (OUTPATIENT)
Dept: ENDOCRINOLOGY | Facility: CLINIC | Age: 74
End: 2024-06-27

## 2024-06-27 ENCOUNTER — APPOINTMENT (OUTPATIENT)
Dept: PHYSICAL THERAPY | Facility: CLINIC | Age: 74
End: 2024-06-27
Payer: MEDICARE

## 2024-06-27 DIAGNOSIS — M54.2 NECK PAIN, CHRONIC: Primary | ICD-10-CM

## 2024-06-27 DIAGNOSIS — G89.29 NECK PAIN, CHRONIC: Primary | ICD-10-CM

## 2024-06-27 DIAGNOSIS — M54.12 CERVICAL RADICULOPATHY: ICD-10-CM

## 2024-06-27 PROCEDURE — 97140 MANUAL THERAPY 1/> REGIONS: CPT | Mod: GP | Performed by: PHYSICAL THERAPIST

## 2024-06-27 PROCEDURE — 97110 THERAPEUTIC EXERCISES: CPT | Mod: GP | Performed by: PHYSICAL THERAPIST

## 2024-06-27 NOTE — TELEPHONE ENCOUNTER
Called looking for her ozempic  - she receives from PAP. She states she is running out and hasn't heard anything. Per sticky note - renewal form was faxed on 6/7    Patient is asking for a call in regard.

## 2024-06-27 NOTE — TELEPHONE ENCOUNTER
Returned patient's call.  I called [facility] P[facility]. They have not received her re-enrollment application as they say that her current enrollment expires on 7/29. Confirmed that she does not have anymore refills either.  She currently has 1 pen left.  I will resend her entire application again.

## 2024-06-27 NOTE — PROGRESS NOTES
"  Physical Therapy    Physical Therapy Treatment    Patient Name: Carolina Martinez  MRN: [id number]  Today's Date: 6/27/2024  Time Calculation  Start Time: 0705  Stop Time: 0750  Time Calculation (min): 45 min     PT Therapeutic Procedures Time Entry  Manual Therapy Time Entry: 10  Therapeutic Exercise Time Entry: 35  Insurance: [facility] Medicare  Certification: 5/17/24 - 8/15/24   PT visit limit: Medical necessity  Visit #5    Assessment:  Shows good improvement in serratus anterior strength with MMT since initial visit.    Plan:  Continue manual therapy and deep neck flexor/ scapular strengthening. Upright posture.    Problem List Items Addressed This Visit             ICD-10-CM    Neck pain, chronic - Primary M54.2, G89.29    Cervical radiculopathy M54.12       Subjective    \"I haven't had too much of a problem. It's 70-80% better.\"    Pain  0/10 currently    Objective   Serratus anterior MMT 4+/5 bilat    Treatments:  Manual Therapy:   Sub occ release, distraction, lat glides    Therapeutic Exercise:   Supine chin tucks x10 w/ 3 sec hold  DNF w/ towel lift off x 10  Supine red band horizontal abd/ER 2x10 ea  Supine serratus press 2# 2x10  Supine pec/biceps stretch x 3 min  Serratus wall slides w/ towel 2x10  Red band rows x20  Red band lat pulls x20    Goals:  Active       PT Problem       Increase B serratus anterior MMT to at least 4/5.       Start:  05/17/24    Expected End:  07/31/24            Pt will report at least 75% decrease in B neck and shoulder/arm symptoms to improve ADLs.       Start:  05/17/24    Expected End:  07/31/24            Improve NDI score by at least 10 points.       Start:  05/17/24    Expected End:  07/31/24            Pt will demonstrate improved postural awareness to reduce stress on neck and shoulder while sitting and standing.       Start:  05/17/24    Expected End:  07/31/24            Pt will demonstrate independence with HEP.       Start:  05/17/24    Expected End:  07/31/24          "
bug in right ear

## 2024-07-01 DIAGNOSIS — I10 HYPERTENSION, UNSPECIFIED TYPE: ICD-10-CM

## 2024-07-02 RX ORDER — METOPROLOL TARTRATE 25 MG/1
25 TABLET, FILM COATED ORAL 2 TIMES DAILY
Qty: 180 TABLET | Refills: 1 | Status: SHIPPED | OUTPATIENT
Start: 2024-07-02

## 2024-07-05 NOTE — TELEPHONE ENCOUNTER
Received form from [email or web address]  There is missing patient information on page 2 of application.  Called patient to notify of missing information on application.  PAP advised that patient can call to correct error.   Advised she contact PAP to correct missing information.  Left patient detailed message with PAP phone number along with patient ID and what is missing from application.   Left our office number as well.   Form scanned into media.

## 2024-07-29 ENCOUNTER — APPOINTMENT (OUTPATIENT)
Dept: PRIMARY CARE | Facility: CLINIC | Age: 74
End: 2024-07-29
Payer: MEDICARE

## 2024-07-29 VITALS
RESPIRATION RATE: 17 BRPM | HEIGHT: 60 IN | DIASTOLIC BLOOD PRESSURE: 82 MMHG | WEIGHT: 173 LBS | SYSTOLIC BLOOD PRESSURE: 137 MMHG | BODY MASS INDEX: 33.96 KG/M2 | HEART RATE: 74 BPM | TEMPERATURE: 97.1 F | OXYGEN SATURATION: 99 %

## 2024-07-29 DIAGNOSIS — J43.9 PULMONARY EMPHYSEMA, UNSPECIFIED EMPHYSEMA TYPE (MULTI): ICD-10-CM

## 2024-07-29 DIAGNOSIS — I10 HYPERTENSION, UNSPECIFIED TYPE: Primary | ICD-10-CM

## 2024-07-29 DIAGNOSIS — G44.86 CERVICOGENIC HEADACHE: ICD-10-CM

## 2024-07-29 DIAGNOSIS — E78.5 HYPERLIPIDEMIA, UNSPECIFIED HYPERLIPIDEMIA TYPE: ICD-10-CM

## 2024-07-29 DIAGNOSIS — R59.0 ENLARGED LYMPH NODE IN NECK: ICD-10-CM

## 2024-07-29 DIAGNOSIS — F41.9 ANXIETY AND DEPRESSION: ICD-10-CM

## 2024-07-29 DIAGNOSIS — G47.33 OSA ON CPAP: ICD-10-CM

## 2024-07-29 DIAGNOSIS — M54.12 CERVICAL RADICULOPATHY: ICD-10-CM

## 2024-07-29 DIAGNOSIS — K21.9 GASTROESOPHAGEAL REFLUX DISEASE WITHOUT ESOPHAGITIS: ICD-10-CM

## 2024-07-29 DIAGNOSIS — R91.8 MULTIPLE LUNG NODULES ON CT: ICD-10-CM

## 2024-07-29 DIAGNOSIS — M54.2 NECK PAIN, CHRONIC: ICD-10-CM

## 2024-07-29 DIAGNOSIS — K58.1 IRRITABLE BOWEL SYNDROME WITH CONSTIPATION: ICD-10-CM

## 2024-07-29 DIAGNOSIS — R93.1 ELEVATED CORONARY ARTERY CALCIUM SCORE: ICD-10-CM

## 2024-07-29 DIAGNOSIS — J30.9 ALLERGIC RHINITIS, UNSPECIFIED SEASONALITY, UNSPECIFIED TRIGGER: ICD-10-CM

## 2024-07-29 DIAGNOSIS — G89.29 NECK PAIN, CHRONIC: ICD-10-CM

## 2024-07-29 DIAGNOSIS — K76.0 FATTY LIVER: ICD-10-CM

## 2024-07-29 DIAGNOSIS — E55.9 VITAMIN D DEFICIENCY: ICD-10-CM

## 2024-07-29 DIAGNOSIS — F32.A ANXIETY AND DEPRESSION: ICD-10-CM

## 2024-07-29 DIAGNOSIS — E11.65 TYPE 2 DIABETES MELLITUS WITH HYPERGLYCEMIA, WITHOUT LONG-TERM CURRENT USE OF INSULIN (MULTI): ICD-10-CM

## 2024-07-29 PROBLEM — R06.09 DYSPNEA ON EXERTION: Status: RESOLVED | Noted: 2023-08-21 | Resolved: 2024-07-29

## 2024-07-29 PROBLEM — R80.9 MICROALBUMINURIA DUE TO TYPE 2 DIABETES MELLITUS (MULTI): Status: RESOLVED | Noted: 2023-06-30 | Resolved: 2024-07-29

## 2024-07-29 PROBLEM — E11.9 DIABETES TYPE 2, CONTROLLED (MULTI): Status: RESOLVED | Noted: 2023-06-30 | Resolved: 2024-07-29

## 2024-07-29 PROBLEM — R22.42 MASS OF LEFT FOOT: Status: RESOLVED | Noted: 2023-06-30 | Resolved: 2024-07-29

## 2024-07-29 PROBLEM — E11.29 MICROALBUMINURIA DUE TO TYPE 2 DIABETES MELLITUS (MULTI): Status: RESOLVED | Noted: 2023-06-30 | Resolved: 2024-07-29

## 2024-07-29 PROBLEM — R09.81 CONGESTION OF NASAL SINUS: Status: RESOLVED | Noted: 2023-08-21 | Resolved: 2024-07-29

## 2024-07-29 PROBLEM — J37.0 CHRONIC LARYNGITIS: Status: RESOLVED | Noted: 2022-05-10 | Resolved: 2024-07-29

## 2024-07-29 PROBLEM — I73.9 INTERMITTENT CLAUDICATION (CMS-HCC): Status: RESOLVED | Noted: 2023-05-22 | Resolved: 2024-07-29

## 2024-07-29 LAB — HEMOGLOBIN A1C/HEMOGLOBIN TOTAL IN BLOOD EXTERNAL: 6 %

## 2024-07-29 PROCEDURE — 4010F ACE/ARB THERAPY RXD/TAKEN: CPT | Performed by: PEDIATRICS

## 2024-07-29 PROCEDURE — 3075F SYST BP GE 130 - 139MM HG: CPT | Performed by: PEDIATRICS

## 2024-07-29 PROCEDURE — G0009 ADMIN PNEUMOCOCCAL VACCINE: HCPCS | Performed by: PEDIATRICS

## 2024-07-29 PROCEDURE — 1157F ADVNC CARE PLAN IN RCRD: CPT | Performed by: PEDIATRICS

## 2024-07-29 PROCEDURE — 1126F AMNT PAIN NOTED NONE PRSNT: CPT | Performed by: PEDIATRICS

## 2024-07-29 PROCEDURE — 99214 OFFICE O/P EST MOD 30 MIN: CPT | Performed by: PEDIATRICS

## 2024-07-29 PROCEDURE — 3008F BODY MASS INDEX DOCD: CPT | Performed by: PEDIATRICS

## 2024-07-29 PROCEDURE — 3062F POS MACROALBUMINURIA REV: CPT | Performed by: PEDIATRICS

## 2024-07-29 PROCEDURE — 3079F DIAST BP 80-89 MM HG: CPT | Performed by: PEDIATRICS

## 2024-07-29 PROCEDURE — 83036 HEMOGLOBIN GLYCOSYLATED A1C: CPT | Performed by: PEDIATRICS

## 2024-07-29 PROCEDURE — 3044F HG A1C LEVEL LT 7.0%: CPT | Performed by: PEDIATRICS

## 2024-07-29 PROCEDURE — 1159F MED LIST DOCD IN RCRD: CPT | Performed by: PEDIATRICS

## 2024-07-29 PROCEDURE — G2211 COMPLEX E/M VISIT ADD ON: HCPCS | Performed by: PEDIATRICS

## 2024-07-29 PROCEDURE — 90677 PCV20 VACCINE IM: CPT | Performed by: PEDIATRICS

## 2024-07-29 RX ORDER — PSYLLIUM HUSK 3.4 G/5.8G
POWDER ORAL
Qty: 1040 G | Refills: 3 | Status: SHIPPED | OUTPATIENT
Start: 2024-07-29

## 2024-07-29 RX ORDER — TIZANIDINE 2 MG/1
TABLET ORAL
Qty: 60 TABLET | Refills: 1 | Status: SHIPPED | OUTPATIENT
Start: 2024-07-29

## 2024-07-29 RX ORDER — DOCUSATE SODIUM 100 MG/1
100 CAPSULE, LIQUID FILLED ORAL 2 TIMES DAILY
Qty: 180 CAPSULE | Refills: 3 | Status: SHIPPED | OUTPATIENT
Start: 2024-07-29

## 2024-07-29 ASSESSMENT — PAIN SCALES - GENERAL: PAINLEVEL: 0-NO PAIN

## 2024-07-29 ASSESSMENT — ENCOUNTER SYMPTOMS: HYPERTENSION: 1

## 2024-07-29 ASSESSMENT — PATIENT HEALTH QUESTIONNAIRE - PHQ9
1. LITTLE INTEREST OR PLEASURE IN DOING THINGS: NOT AT ALL
2. FEELING DOWN, DEPRESSED OR HOPELESS: NOT AT ALL
SUM OF ALL RESPONSES TO PHQ9 QUESTIONS 1 AND 2: 0

## 2024-07-29 NOTE — PROGRESS NOTES
Subjective   Patient ID: Carolina Martinez is a 73 y.o. female who presents for Hyperlipidemia and Hypertension.    Hyperlipidemia    Hypertension   Dexa due 2026; Colonoscopy due 2031; mammogram normal    Review of Systems  On 7/26, she went to get up from sitting and she felt the leg was numb for a minute; She had to hold on till she could walk on it. Did not feel tingly; Arm was not affected.       Objective   /82 (BP Location: Right arm, Patient Position: Sitting, BP Cuff Size: Large adult)   Pulse 74   Temp 36.2 °C (97.1 °F) (Temporal)   Resp 17   Ht 1.524 m (5')   Wt 78.5 kg (173 lb)   LMP  (LMP Unknown)   SpO2 99%   BMI 33.79 kg/m²     Physical Exam  Vitals reviewed.   Constitutional:       General: She is not in acute distress.  HENT:      Head: Normocephalic.      Right Ear: Tympanic membrane normal.      Left Ear: Tympanic membrane normal.      Nose: Nose normal.      Mouth/Throat:      Pharynx: Oropharynx is clear.   Cardiovascular:      Rate and Rhythm: Normal rate and regular rhythm.      Heart sounds: Normal heart sounds.   Pulmonary:      Breath sounds: Normal breath sounds.   Abdominal:      Palpations: Abdomen is soft.      Tenderness: There is no abdominal tenderness.   Musculoskeletal:         General: No tenderness.   Skin:     Findings: No rash.   Neurological:      General: No focal deficit present.      Mental Status: She is alert.   Psychiatric:         Mood and Affect: Mood normal.   Sensation intact    Assessment/Plan   Problem List Items Addressed This Visit             ICD-10-CM    Allergic rhinitis J30.9     Takes flonase         Anxiety and depression F41.9, F32.A     Just find out that sister has cancer of pancreas and liver         Elevated coronary artery calcium score R93.1     LDL 59; takes baby aspirin         Fatty liver K76.0     Next fibroscan due 2027         Cervicogenic headache G44.86    Relevant Medications    tiZANidine (Zanaflex) 2 mg tablet     Hyperlipidemia E78.5     Cholesterol good at 152 in April  Continue rosuva         Hypertension - Primary I10     Home BP 120s/70s; forgot to take her meds this morning         Irritable bowel syndrome with constipation K58.1     Has more since on Ozempic         Relevant Medications    docusate sodium (Colace) 100 mg capsule    psyllium husk, aspartame, (Metamucil Sugar-Free, aspart,) 3.4 gram/5.8 gram powder    Multiple lung nodules on CT R91.8     Sees emphysema         EMERY on CPAP G47.33     Uses CPAP         Vitamin D deficiency E55.9    Enlarged lymph node in neck R59.0     Lymph node had decreased in size; repeat ultrasound in May         Type 2 diabetes mellitus with hyperglycemia (Multi) E11.65     A1C 6.1 in APril; sees eye doctor in October         Relevant Orders    Hemoglobin A1C    RESOLVED: Neck pain, chronic M54.2, G89.29    Pulmonary emphysema (Multi) J43.9     Use albuterol as needed         Gastroesophageal reflux disease without esophagitis K21.9     Seldom has reflux symptoms         Cervical radiculopathy M54.12     PT helps; takes gabapentin

## 2024-07-29 NOTE — ASSESSMENT & PLAN NOTE
Takes flonase   [Dear  ___] : Dear  [unfilled], [Courtesy Letter:] : I had the pleasure of seeing your patient, [unfilled], in my office today. [Please see my note below.] : Please see my note below. [Consult Closing:] : Thank you very much for allowing me to participate in the care of this patient.  If you have any questions, please do not hesitate to contact me. [FreeTextEntry3] : Monica Manning MD [Sincerely,] : Sincerely, [DrShaq  ___] : Dr. CORREA

## 2024-08-02 ENCOUNTER — TELEPHONE (OUTPATIENT)
Dept: ENDOCRINOLOGY | Facility: CLINIC | Age: 74
End: 2024-08-02
Payer: MEDICARE

## 2024-08-02 NOTE — TELEPHONE ENCOUNTER
Left a VM asking if her supply of ozempic came in at [place] office. She states she hasn't received a call and she is in need of the med. She is asking for the provider to call her.

## 2024-08-05 ENCOUNTER — HOSPITAL ENCOUNTER (OUTPATIENT)
Dept: RADIOLOGY | Facility: CLINIC | Age: 74
Discharge: HOME | End: 2024-08-05
Payer: MEDICARE

## 2024-08-05 DIAGNOSIS — R91.8 MULTIPLE LUNG NODULES ON CT: ICD-10-CM

## 2024-08-05 PROCEDURE — 71250 CT THORAX DX C-: CPT

## 2024-08-06 ENCOUNTER — TREATMENT (OUTPATIENT)
Dept: PHYSICAL THERAPY | Facility: CLINIC | Age: 74
End: 2024-08-06
Payer: MEDICARE

## 2024-08-06 DIAGNOSIS — M54.12 CERVICAL RADICULOPATHY: ICD-10-CM

## 2024-08-06 DIAGNOSIS — M54.2 NECK PAIN, CHRONIC: ICD-10-CM

## 2024-08-06 DIAGNOSIS — G89.29 NECK PAIN, CHRONIC: ICD-10-CM

## 2024-08-06 PROCEDURE — 97140 MANUAL THERAPY 1/> REGIONS: CPT | Mod: GP,CQ

## 2024-08-06 PROCEDURE — 97110 THERAPEUTIC EXERCISES: CPT | Mod: GP,CQ

## 2024-08-06 NOTE — PROGRESS NOTES
"  Physical Therapy    Physical Therapy Treatment    Patient Name: Carolina Martinez  MRN: [id number]  Today's Date: 8/6/2024  Time Calculation  Start Time: 0800  Stop Time: 0855  Time Calculation (min): 55 min     PT Therapeutic Procedures Time Entry  Manual Therapy Time Entry: 15  Therapeutic Exercise Time Entry: 40  Insurance: [facility] Medicare  Certification: 5/17/24 - 8/15/24   PT visit limit: Medical necessity  Visit #6    Assessment:  Discussed importance of consistent PT appointments, diaphragmatic breathing to decreased UT tension, and the need for re eval next session 2` ins.    Plan:  Pt needs RE-Eval next session 2` insurance dates.  Upper cervical extension over towel, SNAGs for rotation    Problem List Items Addressed This Visit             ICD-10-CM    Cervical radiculopathy M54.12     Other Visit Diagnoses         Codes    Neck pain, chronic     M54.2, G89.29            Subjective    \"L UT pain lately.  I was out of town for a family reunion and my sister was just diagnosed w/ stage 4 cancer.  Ex's make the neck feel better for about half the day.\"  Pt returns after 6 week absence.  Pain  6/10 currently bilat UT's    Objective   Lower cervical tenderness initially, L.    Treatments:  Manual Therapy:   Sub occ release, distraction, lat glides    Therapeutic Exercise:   DNF x 10, 5\"  Supine wand flexion 10x10\"  Supine wand bilat ER 10x10\"  Supine red band horizontal abd/ER 2x10 ea  Supine serratus press 2# 2x10  Supine pec/biceps stretch x 3 min  Serratus wall slides w/ towel 2x10  Red band rows x20  Red band lat pulls x20  Upright posture #3 3x20\"    Goals:  Active       PT Problem       Increase B serratus anterior MMT to at least 4/5.       Start:  05/17/24    Expected End:  07/31/24            Pt will report at least 75% decrease in B neck and shoulder/arm symptoms to improve ADLs.       Start:  05/17/24    Expected End:  07/31/24            Improve NDI score by at least 10 points.       Start:  05/17/24    " Expected End:  07/31/24            Pt will demonstrate improved postural awareness to reduce stress on neck and shoulder while sitting and standing.       Start:  05/17/24    Expected End:  07/31/24            Pt will demonstrate independence with HEP.       Start:  05/17/24    Expected End:  07/31/24

## 2024-08-13 ENCOUNTER — TREATMENT (OUTPATIENT)
Dept: PHYSICAL THERAPY | Facility: CLINIC | Age: 74
End: 2024-08-13
Payer: MEDICARE

## 2024-08-13 DIAGNOSIS — M54.2 NECK PAIN, CHRONIC: ICD-10-CM

## 2024-08-13 DIAGNOSIS — G89.29 NECK PAIN, CHRONIC: ICD-10-CM

## 2024-08-13 DIAGNOSIS — M54.12 CERVICAL RADICULOPATHY: ICD-10-CM

## 2024-08-13 PROCEDURE — 97140 MANUAL THERAPY 1/> REGIONS: CPT | Mod: GP,CQ

## 2024-08-13 PROCEDURE — 97110 THERAPEUTIC EXERCISES: CPT | Mod: GP,CQ

## 2024-08-13 NOTE — PROGRESS NOTES
"  Physical Therapy    Physical Therapy Treatment    Patient Name: Carolina Martinez  MRN: [id number]  Today's Date: 8/13/2024  Time Calculation  Start Time: 0800  Stop Time: 0855  Time Calculation (min): 55 min     PT Therapeutic Procedures Time Entry  Manual Therapy Time Entry: 25  Therapeutic Exercise Time Entry: 30  Insurance: [facility] Medicare  Certification: 5/17/24 - 8/15/24   PT visit limit: Medical necessity  Visit #7    Assessment:  Crepitus L shoulder noted w/ rows, therefore added some stabilization to L shoulder    Plan:  Assess L shoulder, c-spine sx's    Problem List Items Addressed This Visit             ICD-10-CM    Cervical radiculopathy M54.12     Other Visit Diagnoses         Codes    Neck pain, chronic     M54.2, G89.29            Subjective    \"No rad sx's, L UT only.  Sometimes I hear cracking when I move my neck.\"  Pain  5/10 currently L UT.    Objective   AROM rotation 52` R, 68` L    Treatments:  Manual Therapy:   Sub occ release, distraction, lat glides    Therapeutic Exercise:   DNF x 10, 5\"  Supine wand flexion 10x10\"  Supine wand bilat ER 10x10\"  Supine red band horizontal abd/ER 2x10 ea  Supine serratus press 2# 2x10  Supine pec/biceps stretch x 3 min  Serratus wall slides w/ towel 2x10, YTB  Red band rows, L ext, L ER, L IR, L flex x20  Extension, Rotation SNAGs x 10 ea    Goals:  Active       PT Problem       Increase B serratus anterior MMT to at least 4/5.       Start:  05/17/24    Expected End:  07/31/24            Pt will report at least 75% decrease in B neck and shoulder/arm symptoms to improve ADLs.       Start:  05/17/24    Expected End:  07/31/24            Improve NDI score by at least 10 points.       Start:  05/17/24    Expected End:  07/31/24            Pt will demonstrate improved postural awareness to reduce stress on neck and shoulder while sitting and standing.       Start:  05/17/24    Expected End:  07/31/24            Pt will demonstrate independence with HEP.       Start: "  05/17/24    Expected End:  07/31/24

## 2024-08-20 ENCOUNTER — TREATMENT (OUTPATIENT)
Dept: PHYSICAL THERAPY | Facility: CLINIC | Age: 74
End: 2024-08-20
Payer: MEDICARE

## 2024-08-20 DIAGNOSIS — M54.2 NECK PAIN, CHRONIC: Primary | ICD-10-CM

## 2024-08-20 DIAGNOSIS — G89.29 NECK PAIN, CHRONIC: Primary | ICD-10-CM

## 2024-08-20 DIAGNOSIS — M54.12 CERVICAL RADICULOPATHY: ICD-10-CM

## 2024-08-20 PROCEDURE — 97110 THERAPEUTIC EXERCISES: CPT | Mod: GP,CQ

## 2024-08-20 PROCEDURE — 97140 MANUAL THERAPY 1/> REGIONS: CPT | Mod: GP,CQ

## 2024-08-20 NOTE — PROGRESS NOTES
"  Physical Therapy    Physical Therapy Treatment    Patient Name: Carolina Martinez  MRN: [id number]  Today's Date: 8/20/2024  Time Calculation  Start Time: 0810  Stop Time: 0910  Time Calculation (min): 60 min     PT Therapeutic Procedures Time Entry  Manual Therapy Time Entry: 25  Therapeutic Exercise Time Entry: 30  Insurance: [facility] Medicare  Certification: 5/17/24 - 8/15/24   PT visit limit: Medical necessity  Visit #8    Assessment:  TP L UT today.  Some increased L UT tightness w/ upright posture work,therefore did not add.  Tightness was alleviated w/ L UT stretch and R rotation isometric.    Plan:  Assess L shoulder, c-spine sx's    Problem List Items Addressed This Visit             ICD-10-CM    Cervical radiculopathy M54.12     Other Visit Diagnoses         Codes    Neck pain, chronic    -  Primary M54.2, G89.29            Subjective    \"No rad sx;s.  60% improved overall.  L C-spine pain w/ R rotation.  I am tired today, because I spent the night in the hospital with my sister who is going through cancer treatment.\"    Pain:  0/10 at rest, initially    Objective   AROM rotation 60` R, 63` L    Treatments:  Manual Therapy:   Sub occ release, distraction, lat glides, TPR L UT    Therapeutic Exercise:   Serratus press #3, 2x10  DNF 2x10  Supine red band bilat er, Hor abd x 20 ea  Upper cervical extension over towel x 20  SNAG rotations x 10 ea  Upright posture #5, 5x15\"  L UT self stretch 5x15\"  Serratus wall slides x 20, YTB  Red band rows, bilat ext x 20 ea  R rotation iso x 10, 5\"    Goals:  Active       PT Problem       Increase B serratus anterior MMT to at least 4/5.       Start:  05/17/24    Expected End:  07/31/24            Pt will report at least 75% decrease in B neck and shoulder/arm symptoms to improve ADLs.       Start:  05/17/24    Expected End:  07/31/24            Improve NDI score by at least 10 points.       Start:  05/17/24    Expected End:  07/31/24            Pt will demonstrate improved " postural awareness to reduce stress on neck and shoulder while sitting and standing.       Start:  05/17/24    Expected End:  07/31/24            Pt will demonstrate independence with HEP.       Start:  05/17/24    Expected End:  07/31/24

## 2024-08-26 ENCOUNTER — TELEPHONE (OUTPATIENT)
Dept: PRIMARY CARE | Facility: CLINIC | Age: 74
End: 2024-08-26
Payer: MEDICARE

## 2024-08-26 NOTE — TELEPHONE ENCOUNTER
Dr. Capps, Pt. left a voicemail message stating she's having stomach issues and would like to speak with you. Pt. contact number is [phone].

## 2024-08-27 ENCOUNTER — TREATMENT (OUTPATIENT)
Dept: PHYSICAL THERAPY | Facility: CLINIC | Age: 74
End: 2024-08-27
Payer: MEDICARE

## 2024-08-27 ENCOUNTER — HOSPITAL ENCOUNTER (OUTPATIENT)
Dept: RADIOLOGY | Facility: CLINIC | Age: 74
Discharge: HOME | End: 2024-08-27
Payer: MEDICARE

## 2024-08-27 ENCOUNTER — OFFICE VISIT (OUTPATIENT)
Dept: PRIMARY CARE | Facility: CLINIC | Age: 74
End: 2024-08-27
Payer: MEDICARE

## 2024-08-27 ENCOUNTER — LAB (OUTPATIENT)
Dept: LAB | Facility: LAB | Age: 74
End: 2024-08-27
Payer: MEDICARE

## 2024-08-27 VITALS
OXYGEN SATURATION: 98 % | WEIGHT: 170 LBS | HEART RATE: 95 BPM | DIASTOLIC BLOOD PRESSURE: 85 MMHG | BODY MASS INDEX: 33.2 KG/M2 | SYSTOLIC BLOOD PRESSURE: 151 MMHG

## 2024-08-27 DIAGNOSIS — M54.12 CERVICAL RADICULOPATHY: ICD-10-CM

## 2024-08-27 DIAGNOSIS — R10.32 LEFT LOWER QUADRANT PAIN: ICD-10-CM

## 2024-08-27 DIAGNOSIS — M54.2 NECK PAIN, CHRONIC: Primary | ICD-10-CM

## 2024-08-27 DIAGNOSIS — G89.29 NECK PAIN, CHRONIC: Primary | ICD-10-CM

## 2024-08-27 DIAGNOSIS — R10.32 LEFT LOWER QUADRANT PAIN: Primary | ICD-10-CM

## 2024-08-27 PROBLEM — R10.9 ABDOMINAL PAIN: Status: ACTIVE | Noted: 2024-08-27

## 2024-08-27 LAB
ALBUMIN SERPL BCP-MCNC: 4.3 G/DL (ref 3.4–5)
ALP SERPL-CCNC: 54 U/L (ref 33–136)
ALT SERPL W P-5'-P-CCNC: 13 U/L (ref 7–45)
ANION GAP SERPL CALC-SCNC: 8 MMOL/L
AST SERPL W P-5'-P-CCNC: 18 U/L (ref 9–39)
BASOPHILS # BLD AUTO: 0.03 X10*3/UL (ref 0–0.1)
BASOPHILS NFR BLD AUTO: 0.4 %
BILIRUB SERPL-MCNC: 0.4 MG/DL (ref 0–1.2)
BUN SERPL-MCNC: 11 MG/DL (ref 6–23)
CALCIUM SERPL-MCNC: 10.1 MG/DL (ref 8.6–10.3)
CHLORIDE SERPL-SCNC: 100 MMOL/L (ref 98–107)
CO2 SERPL-SCNC: 32 MMOL/L (ref 21–32)
CREAT SERPL-MCNC: 0.74 MG/DL (ref 0.5–1.05)
EGFRCR SERPLBLD CKD-EPI 2021: 86 ML/MIN/1.73M*2
EOSINOPHIL # BLD AUTO: 0.03 X10*3/UL (ref 0–0.4)
EOSINOPHIL NFR BLD AUTO: 0.4 %
ERYTHROCYTE [DISTWIDTH] IN BLOOD BY AUTOMATED COUNT: 15.8 % (ref 11.5–14.5)
GLUCOSE SERPL-MCNC: 97 MG/DL (ref 74–99)
HCT VFR BLD AUTO: 38.9 % (ref 36–46)
HGB BLD-MCNC: 12.6 G/DL (ref 12–16)
IMM GRANULOCYTES # BLD AUTO: 0.01 X10*3/UL (ref 0–0.5)
IMM GRANULOCYTES NFR BLD AUTO: 0.1 % (ref 0–0.9)
LYMPHOCYTES # BLD AUTO: 3.05 X10*3/UL (ref 0.8–3)
LYMPHOCYTES NFR BLD AUTO: 44 %
MCH RBC QN AUTO: 27.8 PG (ref 26–34)
MCHC RBC AUTO-ENTMCNC: 32.4 G/DL (ref 32–36)
MCV RBC AUTO: 86 FL (ref 80–100)
MONOCYTES # BLD AUTO: 0.65 X10*3/UL (ref 0.05–0.8)
MONOCYTES NFR BLD AUTO: 9.4 %
NEUTROPHILS # BLD AUTO: 3.16 X10*3/UL (ref 1.6–5.5)
NEUTROPHILS NFR BLD AUTO: 45.7 %
NRBC BLD-RTO: 0 /100 WBCS (ref 0–0)
PLATELET # BLD AUTO: 296 X10*3/UL (ref 150–450)
POTASSIUM SERPL-SCNC: 4.7 MMOL/L (ref 3.5–5.3)
PROT SERPL-MCNC: 6.7 G/DL (ref 6.4–8.2)
RBC # BLD AUTO: 4.53 X10*6/UL (ref 4–5.2)
SODIUM SERPL-SCNC: 140 MMOL/L (ref 136–145)
WBC # BLD AUTO: 6.9 X10*3/UL (ref 4.4–11.3)

## 2024-08-27 PROCEDURE — 80053 COMPREHEN METABOLIC PANEL: CPT

## 2024-08-27 PROCEDURE — G2211 COMPLEX E/M VISIT ADD ON: HCPCS | Performed by: PEDIATRICS

## 2024-08-27 PROCEDURE — 99214 OFFICE O/P EST MOD 30 MIN: CPT | Performed by: PEDIATRICS

## 2024-08-27 PROCEDURE — 74177 CT ABD & PELVIS W/CONTRAST: CPT | Performed by: RADIOLOGY

## 2024-08-27 PROCEDURE — 2550000001 HC RX 255 CONTRASTS: Performed by: PEDIATRICS

## 2024-08-27 PROCEDURE — 97140 MANUAL THERAPY 1/> REGIONS: CPT | Mod: GP,CQ

## 2024-08-27 PROCEDURE — 4010F ACE/ARB THERAPY RXD/TAKEN: CPT | Performed by: PEDIATRICS

## 2024-08-27 PROCEDURE — 3062F POS MACROALBUMINURIA REV: CPT | Performed by: PEDIATRICS

## 2024-08-27 PROCEDURE — 3044F HG A1C LEVEL LT 7.0%: CPT | Performed by: PEDIATRICS

## 2024-08-27 PROCEDURE — 74177 CT ABD & PELVIS W/CONTRAST: CPT

## 2024-08-27 PROCEDURE — 97110 THERAPEUTIC EXERCISES: CPT | Mod: GP,CQ

## 2024-08-27 PROCEDURE — 36415 COLL VENOUS BLD VENIPUNCTURE: CPT

## 2024-08-27 PROCEDURE — 85025 COMPLETE CBC W/AUTO DIFF WBC: CPT

## 2024-08-27 PROCEDURE — 3077F SYST BP >= 140 MM HG: CPT | Performed by: PEDIATRICS

## 2024-08-27 PROCEDURE — 1157F ADVNC CARE PLAN IN RCRD: CPT | Performed by: PEDIATRICS

## 2024-08-27 PROCEDURE — 3079F DIAST BP 80-89 MM HG: CPT | Performed by: PEDIATRICS

## 2024-08-27 ASSESSMENT — ENCOUNTER SYMPTOMS
RECTAL PAIN: 0
DIARRHEA: 0
VOMITING: 0
BLOOD IN STOOL: 0
CONSTIPATION: 0
CHEST TIGHTNESS: 0
NAUSEA: 1
DIFFICULTY URINATING: 0
FATIGUE: 0
ABDOMINAL PAIN: 1
CHILLS: 0
SHORTNESS OF BREATH: 0
FEVER: 0

## 2024-08-27 NOTE — PROGRESS NOTES
Subjective   Patient ID: Carolina Martinez is a 73 y.o. female who presents for stomach issues.     Stomach pain LUQ started about a year ago. Originally she attributed the pain to the ozempic but it has since gotten worse in the last 4 to six months which has concerned her. She said it does get worse with caffine and some other food types. She has noticed her stools are darker since taking ozempic. The pain is located around her inguinal area on the left side and she was worried about it being ovarian in nature, she did have her left ovary removed many years ago. The pain does not radiate and sticks to the left side.        Patient states that she has left lower quadrant pain started beginning of the year and would be intermittent every few days lasting up to some hours mostly if she ate spicy food, caffeine or dairy; she would have nausea sometimes with it. LLQ pain became more frequent now, like every day or every other day. Pain lasts longer; Ozempic makes her constipated. She had her last bowel movement yesterday. Stool was hard yesterday. She has BM only twice a week.  Review of Systems   Constitutional:  Negative for chills, fatigue and fever.   Respiratory:  Negative for chest tightness and shortness of breath.    Cardiovascular:  Negative for chest pain.   Gastrointestinal:  Positive for abdominal pain (LUQ) and nausea. Negative for blood in stool, constipation, diarrhea, rectal pain and vomiting.   Genitourinary:  Negative for difficulty urinating.       Objective   /85   Pulse 95   Wt 77.1 kg (170 lb)   LMP  (LMP Unknown)   SpO2 98%   BMI 33.20 kg/m²     Physical Exam  Eyes:      Extraocular Movements: Extraocular movements intact.   Cardiovascular:      Rate and Rhythm: Normal rate and regular rhythm.      Pulses: Normal pulses.      Heart sounds: Normal heart sounds.   Pulmonary:      Effort: Pulmonary effort is normal.      Breath sounds: Normal breath sounds.   Abdominal:      General:  Abdomen is flat. Bowel sounds are normal.      Tenderness: There is abdominal tenderness.   Musculoskeletal:      Cervical back: Normal range of motion.   Skin:     General: Skin is warm and dry.   Neurological:      General: No focal deficit present.      Mental Status: She is alert.         Assessment/Plan   Problem List Items Addressed This Visit       Left lower quadrant pain - Primary    Relevant Orders    CT abdomen pelvis w IV contrast (Completed)    Comprehensive Metabolic Panel (Completed)    CBC and Auto Differential (Completed)

## 2024-08-27 NOTE — PROGRESS NOTES
"  Physical Therapy    Physical Therapy Treatment    Patient Name: Carolina Martinez  MRN: [id number]  Today's Date: 8/27/2024  Time Calculation  Start Time: 0810  Stop Time: 0910  Time Calculation (min): 60 min     PT Therapeutic Procedures Time Entry  Manual Therapy Time Entry: 15  Therapeutic Exercise Time Entry: 45  Insurance: [facility] Medicare  Certification: 5/17/24 - 8/15/24   PT visit limit: Medical necessity  Visit #8    Assessment:  Pt showing good gains towards goals established at initial eval    Plan:  Overhead farmers carry w/ light weight    Problem List Items Addressed This Visit             ICD-10-CM    Cervical radiculopathy M54.12     Other Visit Diagnoses         Codes    Neck pain, chronic    -  Primary M54.2, G89.29              Subjective    \"No rad sx;s.  60% improved overall.  L C-spine pain w/ R rotation.  I am tired today, because I spent the night in the hospital with my sister who is going through cancer treatment.\"    Pain:  0/10 at rest, initially    Objective   AROM rotation 60` R, 63` L    Treatments:  Manual Therapy:   Sub occ release, distraction, lat glides, TPR L UT    Therapeutic Exercise:   Serratus press #3, 2x10  DNF x10  Supine red band bilat er, Hor abd x 20 ea  Upper cervical extension over towel x 20  SNAG rotations x 10 ea  Upright posture #5, 3x20\"  L UT self stretch 3x20\"  Serratus \"Y\" wall slides x 20,  Red band rows, bilat ext x 20 ea  C-spine iso's x 10, 5\"  HP to C-spine EOS    Goals:  Active       PT Problem       Increase B serratus anterior MMT to at least 4/5.       Start:  05/17/24    Expected End:  07/31/24            Pt will report at least 75% decrease in B neck and shoulder/arm symptoms to improve ADLs.       Start:  05/17/24    Expected End:  07/31/24            Improve NDI score by at least 10 points.       Start:  05/17/24    Expected End:  07/31/24            Pt will demonstrate improved postural awareness to reduce stress on neck and shoulder while sitting and " standing.       Start:  05/17/24    Expected End:  07/31/24            Pt will demonstrate independence with HEP.       Start:  05/17/24    Expected End:  07/31/24

## 2024-09-03 ENCOUNTER — APPOINTMENT (OUTPATIENT)
Dept: PHYSICAL THERAPY | Facility: CLINIC | Age: 74
End: 2024-09-03
Payer: MEDICARE

## 2024-09-06 DIAGNOSIS — I10 HYPERTENSION, UNSPECIFIED TYPE: ICD-10-CM

## 2024-09-06 RX ORDER — ROSUVASTATIN CALCIUM 20 MG/1
20 TABLET, COATED ORAL DAILY
Qty: 90 TABLET | Refills: 1 | Status: SHIPPED | OUTPATIENT
Start: 2024-09-06

## 2024-09-13 NOTE — PROGRESS NOTES
Primary Care Physician: Ashtyn Capps MD  Date of Visit: 2024 11:00 AM EDT      Chief Complaint:   Dr. Capps referred for coronary atherosclerosis     HPI / Summary:   Carolina Martinez is a 74 y.o. female with h/o asthma, HTN, diabetes.  No prior cardiac history.    CT chest 2024: Mild coronary calcification, thoracic aorta with minimal vascular calcifications, numerous bilateral pulmonary nodules 6 mm or less not changed since     Denies any chest pain or pressure  Denies shortness of breath, palpitations, presyncope or syncope  Compliant with medications including rosuvastatin, HCTZ, losartan, aspirin and Ozempic and metformin.  No regular activity or exercise  Former smoker quit approximately 20 years ago    Last Cardiology Tests:  EC24  Normal sinus rhythm with HR 77 bpm, poor quality ECG (I personally reviewed)  Echo: 22   1. Left ventricular systolic function is normal with a 55-60% estimated ejection fraction.   2. No significant valvular abnormalities.    Cath:  none    Stress Test: Nuclear Stress 21  1.  Normal myocardial perfusion study without evidence of ischemia or  prior infarction.  2. The left ventricle is normal in size.  3. Normal LV wall motion with an LV EF estimated at greater than 65%.  4. Sub 6 mm pulmonary nodule versus intrapulmonary lymph nodes as  appreciated on CT cardiac scoring exam performed 2021.        Cardiac Imaging:      Past Medical History:  Past Medical History:   Diagnosis Date    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    Carotidynia    Other specified soft tissue disorders 09/15/2021    Calf swelling    Personal history of diseases of the blood and blood-forming organs and certain disorders involving the immune mechanism 2021    History of anemia    Personal history of other diseases of the circulatory system 2021    History of intermittent claudication    Personal history of other diseases of the respiratory system 2022     History of chronic obstructive lung disease    Personal history of other specified conditions 08/01/2022    History of dizziness    Shortness of breath 09/01/2021    SOB (shortness of breath) on exertion        Past Surgical History:  No past surgical history on file.       Social History:  She reports that she quit smoking about 18 years ago. Her smoking use included cigarettes. She started smoking about 51 years ago. She has a 33 pack-year smoking history. She has never used smokeless tobacco. She reports current alcohol use of about 1.0 standard drink of alcohol per week. She reports that she does not use drugs.    Family History:  family history includes Breast cancer (age of onset: 85) in her father's sister; Coronary artery disease in her brother and sister; Heart attack in her brother; Lung cancer in her sister; Suicide Attempts in her daughter.      Allergies:  Allergies   Allergen Reactions    Codeine Hives, Itching and Rash    Ace Inhibitors Swelling and Other    Atorvastatin Other    Penicillin Unknown    Sulfa (Sulfonamide Antibiotics) Other and Unknown       Outpatient Medications:  Current Outpatient Medications   Medication Instructions    albuterol (Ventolin HFA) 90 mcg/actuation inhaler 2 puffs, inhalation, Every 4 hours PRN    albuterol 90 mcg/actuation inhaler 2 puffs, inhalation, Every 4 hours PRN    ascorbic acid (VITAMIN C) 1,000 mg, oral, Daily    aspirin 81 mg EC tablet 1 tablet, oral, Daily    blood pressure monitor kit Check BP daily    cholecalciferol (Vitamin D-3) 50 MCG (2000 UT) tablet oral    docusate sodium (COLACE) 100 mg, oral, 2 times daily    fluticasone (Flonase) 50 mcg/actuation nasal spray 2 sprays, Each Nostril, Daily    gabapentin (NEURONTIN) 300 mg, oral, 3 times daily    hydroCHLOROthiazide (HYDRODIURIL) 25 mg, oral, Daily    lancets 33 gauge misc Use As Directed twice daily    lidocaine (Xylocaine) 5 % ointment     losartan (COZAAR) 100 mg, oral, Daily    metFORMIN XR  (GLUCOPHAGE-XR) 500 mg, oral, Daily with evening meal, Do not crush, chew, or split.    metoprolol tartrate (LOPRESSOR) 25 mg, oral, 2 times daily    omeprazole (PRILOSEC) 40 mg, oral, Daily    OneTouch Ultra Test strip USE 1 STRIP --test daily; type 2 diabetes    Ozempic 2 mg, subcutaneous, Once Weekly    potassium chloride CR 10 mEq ER tablet Take one a day    psyllium husk, aspartame, (Metamucil Sugar-Free, aspart,) 3.4 gram/5.8 gram powder 2 tsp daily in 8 ounces of liquid    rosuvastatin (CRESTOR) 20 mg, oral, Daily    tiZANidine (Zanaflex) 2 mg tablet Take one  or two at bedtime    vitamin E 90 mg (200 unit) capsule 1 tablet, oral, Daily    vitamin-B complex split tablet 1 tablet, oral, Daily       Review of Systems:  A complete 10 point ROS was performed and is negative except for HPI    Physical Exam:  GENERAL: alert, cooperative, pleasant, in no acute distress  SKIN: warm, dry, no rash.  NECK: no JVD, no IZABEL  CARDIAC: Regular rate and rhythm with no rubs, murmurs, or gallops  CHEST: Normal respiratory efforts, lungs clear to auscultation bilaterally.  ABDOMEN: soft, nontender, nondistended  EXTREMITIES: no edema  NEURO: Alert and oriented x 3.  Grossly normal.  Moves all 4 extremities.    Vitals:    09/18/24 1039   BP: 107/71   BP Location: Left arm   Patient Position: Sitting   Pulse: 90   SpO2: 97%   Weight: 73.9 kg (163 lb)     Wt Readings from Last 5 Encounters:   08/27/24 77.1 kg (170 lb)   07/29/24 78.5 kg (173 lb)   06/20/24 80.7 kg (178 lb)   05/28/24 78.9 kg (174 lb)   05/01/24 79.8 kg (175 lb 14.8 oz)     Body mass index is 31.83 kg/m².        Last Labs:  CMP:  Recent Labs     08/27/24  1434 11/14/23  0805 08/16/23  0520 09/15/22  0913    140  --  140   K 4.7 3.9  --  4.1    99  --  99   CO2 32 33*  --  31   ANIONGAP 8 12  --  10   BUN 11 13 13 18   CREATININE 0.74 0.75 0.84 0.7   EGFR 86 85  --  92   GLUCOSE 97 93  --  139*     Recent Labs     08/27/24  1434 11/14/23  0805  04/13/23  0801   ALBUMIN 4.3 4.3 4.0   ALKPHOS 54 61 63   ALT 13 17 18   AST 18 21 19   BILITOT 0.4 0.5 0.3     CBC:  Recent Labs     08/27/24  1434 11/14/23  0805 09/15/22  0913   WBC 6.9 5.4 5.4   HGB 12.6 13.2 13.4   HCT 38.9 40.9 41.9    271 246   MCV 86 87 88.4     COAG:   Recent Labs     03/03/21  2336 12/27/20  1645   INR 1.0 1.5*   DDIMERVTE 2,156* 6,729*     ENDO:  Recent Labs     07/29/24  0921 05/28/24  0808 11/29/23  1644 04/13/23  0801 09/15/22  0913 05/11/22  0906 12/30/21  0749   TSH  --   --   --   --   --  0.98 2.47   HGBA1C 6.0 5.8 6.0* 6.2*   < >  --   --     < > = values in this interval not displayed.      CARDIAC:   Recent Labs     11/14/23  1049 11/14/23  0805 12/27/20  1645   TROPHS 4 4  --    BNP  --  10 30     Recent Labs     04/13/23  0801 03/23/23  0901 09/03/21  0938   CHOL 132 135 117   LDLF 59 61 57   HDL 54.1 49.8 38.0*   TRIG 93 123 108     The 10-year ASCVD risk score (aSyda PAIGE, et al., 2019) is: 16.3%    Values used to calculate the score:      Age: 74 years      Sex: Female      Is Non- : Yes      Diabetic: Yes      Tobacco smoker: No      Systolic Blood Pressure: 107 mmHg      Is BP treated: Yes      HDL Cholesterol: 54.1 mg/dL      Total Cholesterol: 132 mg/dL        Assessment/Plan   74-year-old female with HTN, diabetes, dyslipidemia, GERD and recent noncontrast chest CT mentioning some mild coronary calcifications as well as aortic atherosclerosis.  She does not have any concerning ischemic symptoms.  She is on good preventive therapy with aspirin, statin.  Her diabetes is well-controlled with hemoglobin A1c of 6% on Ozempic and metformin.  Blood pressure well-controlled on HCTZ and losartan.  Stress test in 2021 unremarkable.  Reassurance provided.  Continue primary prevention.  Ischemic warning signs were reviewed with her and discussed and she knows to contact me if develops any of these.  Follow-up as needed.      Followup Appts:  Future  Appointments   Date Time Provider Department Center   9/18/2024 11:00 AM Roger Lau DO [id number] [facility]   9/24/2024  8:00 AM Shemar Lester DO [id number] [facility]   10/17/2024 10:30 AM Jalen Singletary MD [id number] [facility]   12/3/2024  8:00 AM Esmer Gonzales MD [id number] [facility]   2/4/2025  8:15 AM Ashtyn Capps MD [id number] [facility]           ____________________________________________________________  Roger Lau DO  [facility] [place]  [facility]

## 2024-09-18 ENCOUNTER — OFFICE VISIT (OUTPATIENT)
Dept: CARDIOLOGY | Facility: CLINIC | Age: 74
End: 2024-09-18
Payer: MEDICARE

## 2024-09-18 VITALS
OXYGEN SATURATION: 97 % | SYSTOLIC BLOOD PRESSURE: 107 MMHG | DIASTOLIC BLOOD PRESSURE: 71 MMHG | WEIGHT: 163 LBS | BODY MASS INDEX: 31.83 KG/M2 | HEART RATE: 90 BPM

## 2024-09-18 DIAGNOSIS — Z71.89 ENCOUNTER FOR CARDIAC RISK COUNSELING: ICD-10-CM

## 2024-09-18 DIAGNOSIS — R93.1 ELEVATED CORONARY ARTERY CALCIUM SCORE: Primary | ICD-10-CM

## 2024-09-18 DIAGNOSIS — E78.49 OTHER HYPERLIPIDEMIA: ICD-10-CM

## 2024-09-18 DIAGNOSIS — I10 HYPERTENSION, UNSPECIFIED TYPE: ICD-10-CM

## 2024-09-18 DIAGNOSIS — E11.65 TYPE 2 DIABETES MELLITUS WITH HYPERGLYCEMIA, WITHOUT LONG-TERM CURRENT USE OF INSULIN: ICD-10-CM

## 2024-09-18 PROCEDURE — 4010F ACE/ARB THERAPY RXD/TAKEN: CPT | Performed by: INTERNAL MEDICINE

## 2024-09-18 PROCEDURE — 1159F MED LIST DOCD IN RCRD: CPT | Performed by: INTERNAL MEDICINE

## 2024-09-18 PROCEDURE — 3078F DIAST BP <80 MM HG: CPT | Performed by: INTERNAL MEDICINE

## 2024-09-18 PROCEDURE — 1036F TOBACCO NON-USER: CPT | Performed by: INTERNAL MEDICINE

## 2024-09-18 PROCEDURE — 99214 OFFICE O/P EST MOD 30 MIN: CPT | Performed by: INTERNAL MEDICINE

## 2024-09-18 PROCEDURE — 3062F POS MACROALBUMINURIA REV: CPT | Performed by: INTERNAL MEDICINE

## 2024-09-18 PROCEDURE — 1157F ADVNC CARE PLAN IN RCRD: CPT | Performed by: INTERNAL MEDICINE

## 2024-09-18 PROCEDURE — 1126F AMNT PAIN NOTED NONE PRSNT: CPT | Performed by: INTERNAL MEDICINE

## 2024-09-18 PROCEDURE — 3074F SYST BP LT 130 MM HG: CPT | Performed by: INTERNAL MEDICINE

## 2024-09-18 PROCEDURE — 3044F HG A1C LEVEL LT 7.0%: CPT | Performed by: INTERNAL MEDICINE

## 2024-09-18 PROCEDURE — 99204 OFFICE O/P NEW MOD 45 MIN: CPT | Performed by: INTERNAL MEDICINE

## 2024-09-18 ASSESSMENT — PAIN SCALES - GENERAL: PAINLEVEL: 0-NO PAIN

## 2024-09-24 ENCOUNTER — APPOINTMENT (OUTPATIENT)
Dept: ORTHOPEDIC SURGERY | Facility: CLINIC | Age: 74
End: 2024-09-24
Payer: MEDICARE

## 2024-09-24 ENCOUNTER — HOSPITAL ENCOUNTER (OUTPATIENT)
Dept: RADIOLOGY | Facility: EXTERNAL LOCATION | Age: 74
Discharge: HOME | End: 2024-09-24

## 2024-09-24 DIAGNOSIS — G44.86 CERVICOGENIC HEADACHE: ICD-10-CM

## 2024-09-24 DIAGNOSIS — M16.12 ARTHRITIS OF LEFT HIP: ICD-10-CM

## 2024-09-24 PROCEDURE — 4010F ACE/ARB THERAPY RXD/TAKEN: CPT | Performed by: FAMILY MEDICINE

## 2024-09-24 PROCEDURE — 20611 DRAIN/INJ JOINT/BURSA W/US: CPT | Performed by: FAMILY MEDICINE

## 2024-09-24 PROCEDURE — 1036F TOBACCO NON-USER: CPT | Performed by: FAMILY MEDICINE

## 2024-09-24 PROCEDURE — 99213 OFFICE O/P EST LOW 20 MIN: CPT | Performed by: FAMILY MEDICINE

## 2024-09-24 PROCEDURE — 3044F HG A1C LEVEL LT 7.0%: CPT | Performed by: FAMILY MEDICINE

## 2024-09-24 PROCEDURE — 3062F POS MACROALBUMINURIA REV: CPT | Performed by: FAMILY MEDICINE

## 2024-09-24 PROCEDURE — 1159F MED LIST DOCD IN RCRD: CPT | Performed by: FAMILY MEDICINE

## 2024-09-24 PROCEDURE — 1157F ADVNC CARE PLAN IN RCRD: CPT | Performed by: FAMILY MEDICINE

## 2024-09-24 RX ORDER — LIDOCAINE HYDROCHLORIDE 10 MG/ML
4 INJECTION, SOLUTION INFILTRATION; PERINEURAL
Status: COMPLETED | OUTPATIENT
Start: 2024-09-24 | End: 2024-09-24

## 2024-09-24 RX ORDER — TRIAMCINOLONE ACETONIDE 40 MG/ML
80 INJECTION, SUSPENSION INTRA-ARTICULAR; INTRAMUSCULAR
Status: COMPLETED | OUTPATIENT
Start: 2024-09-24 | End: 2024-09-24

## 2024-09-24 NOTE — PROGRESS NOTES
** Please excuse any errors in grammar or translation related to this dictation. Voice recognition software was utilized to prepare this document. **    Assessment & Plan:  Patient here for ongoing management of left hip arthritis. Given previous positive response to steroid injection, offered to repeat today which patient was agreeable to have completed.   Non-operative treatment options reviewed below.  - Maintaining a healthy weight: Every pound of bodyweight is about 4-5 pounds through the lower extremity.   - Exercise program to strengthen supportive musculature.    - Activity modifications as needed to include use of cane/walker or braces.  - Prescription and otc analgesics to include but not limited to APAP, ibuprofen, naproxen, diclofenac gel.  - Steroid injection.  - Hyaluronic acid injection.  - PRP injection.  - Referral to pain management for potential nerve ablation.  - After failing non-operative measures, may ultimately require arthroplasty.  Informed patient that steroid injection can be repeated every 3 or more months as symptoms dictate.  Follow-up as needed for ongoing management. All questions answered and patient is agreeable to this plan.       Chief complaint: L hip pain    HPI:  9/24/24:  Following up with L hip pain secondary to OA.  Patient reports injection from 6/18 provided intermittent relief up until now.  Pain was never very severe.  Denies any new injuries. Tylenol, ibuprofen, and gabapentin as needed.     6/18/24: Patient reports injection from 3/12 provided relief up until the last week.  Denies any new injuries.  Takes tylenol or ibuprofen as needed.     3/12/24:  Patient reports injection on 12/12/23 provided 3 months of relief. She only started having pain the last few days.  Denies any new injuries. Notes that she is using Ozempic for weight loss and that this has helped lessen the severity of her symptoms.  Will take Tylenol or naproxen intermittently for breakthrough  symptoms.     12/12/23:  Patient reports injection on 9/12 provided close to the 3 months relief. Occasional use of Tylenol if needed for breakthrough pain but this is infrequent.   Denies any new injuries.    9/12/23: F/u for left hip arthritis management. Patient reports injection on 6/15 provided close to 3 months relief. No reported progression in symptoms. She is requesting another injection.      6/15/23: Follow-up for left hip pain secondary to arthritis. Reports that steroid injection helped provide pain relief for nearly 3 months. Physical therapy has also been helping with her strengthening. States her therapist told her her range of motion of her hip is also been improving.      3/15/23: 73 y/o F, hx of obesity, DM and HTN, presents with left hip pain. Pain has been intermittently occurring for the past 4-5 years. Most recent pain has been continuous for 4 weeks. Pain is present in lateral hip and into groin. Pain has reduced her ROM. Pain increases with activity along with quick changes in weather temperature. Has been managing with otc analgesics Tylenol and Aleve which do not help at all. No previous injections or therapy for hip.    Exam:  No erythema, warmth or ecchymosis overlying injection site of left hip joint.   Limited hip flexion and IR.  + FADIR. - [place], - JOVANA.    General Exam:  Constitutional - NAD, AAO x 3, conversing appropriately.  HEENT- Normocephalic and atraumatic. EOMI, PERRLA, No scleral icterus. No facial deformities. Hearing grossly normal.  Lungs - Breathing non-labored with normal rate. No accessory muscle use.  CV - Extremities warm and well-perfused, brisk capillary refill present.   Neuro - CN II-XII grossly intact.    Results:  3/15/23: Xrays of L hip demonstrate moderate degenerative changes.     July 2024 HbA1c 6.0%   August 2024 GFR 86    Procedure:  Patient ID: Carolina Martinez is a 74 y.o. female.    L Inj/Asp: L hip joint on 9/24/2024 7:59 AM  Indications:  pain  Details: 22 G needle, ultrasound-guided anterior approach  Medications: 80 mg triamcinolone acetonide 40 mg/mL; 4 mL lidocaine 10 mg/mL (1 %)  Outcome: tolerated well, no immediate complications    Procedure risk factors to include increased pain, bleeding, infection, neurovascular injury, soft tissue injury, transient elevation of blood glucose and blood pressure, and adverse reaction to medication were discussed with the patient. Patient understands there is a moderate risk of morbidity from undergoing the procedure.    Procedure, treatment alternatives, risks and benefits explained, specific risks discussed. Consent was given by the patient. Immediately prior to procedure a time out was called to verify the correct patient, procedure, equipment, support staff and site/side marked as required. Patient was prepped and draped in the usual sterile fashion.

## 2024-09-25 RX ORDER — TIZANIDINE 2 MG/1
TABLET ORAL
Qty: 60 TABLET | Refills: 0 | Status: SHIPPED | OUTPATIENT
Start: 2024-09-25

## 2024-09-27 ENCOUNTER — TELEPHONE (OUTPATIENT)
Dept: PRIMARY CARE | Facility: CLINIC | Age: 74
End: 2024-09-27
Payer: MEDICARE

## 2024-09-27 NOTE — TELEPHONE ENCOUNTER
Dr. Capps Pt. left a voicemail message stating she had a shot back in July or August and she don't know what it was and it's time for a COVID shot she don't know how to proceed call her back to advise at [phone]. Thanks

## 2024-10-17 ENCOUNTER — APPOINTMENT (OUTPATIENT)
Dept: NEUROLOGY | Facility: CLINIC | Age: 74
End: 2024-10-17
Payer: MEDICARE

## 2024-10-17 VITALS
HEART RATE: 86 BPM | SYSTOLIC BLOOD PRESSURE: 150 MMHG | HEIGHT: 60 IN | DIASTOLIC BLOOD PRESSURE: 77 MMHG | BODY MASS INDEX: 31.83 KG/M2

## 2024-10-17 DIAGNOSIS — M54.2 CHRONIC NECK PAIN: Primary | ICD-10-CM

## 2024-10-17 DIAGNOSIS — G62.9 SMALL FIBER NEUROPATHY: ICD-10-CM

## 2024-10-17 DIAGNOSIS — G44.86 CERVICOGENIC HEADACHE: ICD-10-CM

## 2024-10-17 DIAGNOSIS — G89.29 CHRONIC NECK PAIN: Primary | ICD-10-CM

## 2024-10-17 PROCEDURE — 1160F RVW MEDS BY RX/DR IN RCRD: CPT | Performed by: PSYCHIATRY & NEUROLOGY

## 2024-10-17 PROCEDURE — 3078F DIAST BP <80 MM HG: CPT | Performed by: PSYCHIATRY & NEUROLOGY

## 2024-10-17 PROCEDURE — 1159F MED LIST DOCD IN RCRD: CPT | Performed by: PSYCHIATRY & NEUROLOGY

## 2024-10-17 PROCEDURE — 4010F ACE/ARB THERAPY RXD/TAKEN: CPT | Performed by: PSYCHIATRY & NEUROLOGY

## 2024-10-17 PROCEDURE — 3062F POS MACROALBUMINURIA REV: CPT | Performed by: PSYCHIATRY & NEUROLOGY

## 2024-10-17 PROCEDURE — 1157F ADVNC CARE PLAN IN RCRD: CPT | Performed by: PSYCHIATRY & NEUROLOGY

## 2024-10-17 PROCEDURE — 3077F SYST BP >= 140 MM HG: CPT | Performed by: PSYCHIATRY & NEUROLOGY

## 2024-10-17 PROCEDURE — 99214 OFFICE O/P EST MOD 30 MIN: CPT | Performed by: PSYCHIATRY & NEUROLOGY

## 2024-10-17 PROCEDURE — 3044F HG A1C LEVEL LT 7.0%: CPT | Performed by: PSYCHIATRY & NEUROLOGY

## 2024-10-17 NOTE — PROGRESS NOTES
Subjective     Carolina Martinez is a 74 y.o. year old female seen in follow-up for headache, neck pain, suspected small fiber neuropathy in context of diabetes.    HPI    74-year-old woman with past medical history significant for type 2 diabetes, hypertension, hyperlipidemia, COPD, fatty liver, former smoking.     I evaluated her initially on 3/14/2022. As detailed in my ambulatory EMR note from that date, she has been bothered by left hemicranial headaches associated with left lateral cervical pain radiating into the shoulder. She has undergone imaging studies and lab evaluations as detailed in my initial note. She additionally complained of pain in both feet without notable findings on exam to suggest a component of large fiber neuropathy. I have suspected small fiber neuropathy related to diabetes but I did send her for labs including SIEP and cryoglobulins which were normal.     I recommended lidocaine 5% ointment applied to the feet twice a day, and baclofen 5 mg 3 times daily for headaches and neck pain.     I evaluated her again on 7/20/2022, by A/V visit. She indicated difficulty tolerating baclofen and was relying strictly on acetaminophen for headaches and neck pain. She continued noting left hemicranial headache associate with left lateral cervical pain about once a week on average. Her previous foot complaints had resolved, possibly with the benefit of lidocaine ointment.     I evaluated her again on 3/6/2023 in the office. She continued to experience left hemicranial headaches and also emphasized episodes of left hemibody pain. I discussed a suspected cervicogenic component and recommended a course of physical therapy targeting the cervical spine. I advised that she continue lidocaine ointment for foot pain.     I evaluated her most recently on 11/16/2023.  As detailed in my note from that date she reported a stable low headache frequency.  She was mainly bothered by left neck and upper  trapezius/shoulder pain.  She has significant multilevel degenerative change of the cervical spine as demonstrated by neck soft tissue CT.  I recommended use of a TENS unit for which I provided an order.    She is evaluated again today in the office.    She is still mainly bothered by neck pain which tends to be in the left lateral region.  She is in fact wearing a lidocaine patch on this region today.  She did a course of physical therapy for her neck after presenting to the ED with pain, and found it helpful.  She has been given a set of home exercises which she tries to do on a regular basis.  Her neck pain has moderated somewhat but remains a daily annoyance.    Her arthritic pain in general worsens with cold weather.  She notes limited benefit from acetaminophen but it makes her a bit drowsy so she typically takes 2 of the 650 mg pills at bedtime and does not take acetaminophen earlier in the day.    Similarly, gabapentin which she takes only occasionally at a 300 mg dose makes her drowsy, so she typically limits it to bedtime use.    She has not obtained or tried a TENS unit.    Headaches occur about twice a week and seem to emanate from the neck.  They are not severe or debilitating.    Although I have suspected a likely small fiber neuropathy in the context of diabetes she notes only infrequent pain in the feet.  On a more frequent basis she notes pins-and-needles sensations but there is no dense subjective numbness.  She remains ambulatory without assistive devices.    Review of Systems    As per the history of present illness    Patient Active Problem List   Diagnosis    Allergic rhinitis    Anxiety and depression    Arthritis of left hip    Diabetic peripheral neuropathy (Multi)    Elevated coronary artery calcium score    Fatty liver    Cervicogenic headache    Hearing loss    History of herpes genitalis    Hyperlipidemia    Hypertension    Abnormal findings on esophagogastroduodenoscopy (EGD)     Irritable bowel syndrome with constipation    Mild cognitive impairment    Multiple lung nodules on CT    Neuropathic pain of both feet    EMERY on CPAP    Vitamin D deficiency    Enlarged lymph node in neck    Abnormal otoacoustic emissions test    Acquired hammertoes of both feet    Diffusion capacity of lung (dl), decreased    Elevated erythrocyte sedimentation rate    Encounter for diabetic foot exam (Multi)    Hair thinning    Leg cramping    Class 2 severe obesity due to excess calories with serious comorbidity and body mass index (BMI) of 36.0 to 36.9 in adult    Type 2 diabetes mellitus with hyperglycemia (Multi)    Constipation    Pulmonary emphysema (Multi)    Gastroesophageal reflux disease without esophagitis    Tension type headache    Family history of colonic polyps    Cervical radiculopathy    Abdominal pain    Left lower quadrant pain     Past Medical History:   Diagnosis Date    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    Carotidynia    Other specified soft tissue disorders 09/15/2021    Calf swelling    Personal history of diseases of the blood and blood-forming organs and certain disorders involving the immune mechanism 2021    History of anemia    Personal history of other diseases of the circulatory system 2021    History of intermittent claudication    Personal history of other diseases of the respiratory system 2022    History of chronic obstructive lung disease    Personal history of other specified conditions 2022    History of dizziness    Shortness of breath 2021    SOB (shortness of breath) on exertion     No past surgical history on file.  Social History     Tobacco Use    Smoking status: Former     Current packs/day: 0.00     Average packs/day: 1 pack/day for 33.0 years (33.0 ttl pk-yrs)     Types: Cigarettes     Start date:      Quit date: 2006     Years since quittin.8    Smokeless tobacco: Never   Substance Use Topics    Alcohol use: Yes      Alcohol/week: 1.0 standard drink of alcohol     Types: 1 Glasses of wine per week     Comment: social     family history includes Breast cancer (age of onset: 85) in her father's sister; Coronary artery disease in her brother and sister; Heart attack in her brother; Lung cancer in her sister; Suicide Attempts in her daughter.    Current Outpatient Medications:     albuterol (Ventolin HFA) 90 mcg/actuation inhaler, Inhale 2 puffs every 4 hours if needed for wheezing or shortness of breath., Disp: 8 g, Rfl: 5    albuterol 90 mcg/actuation inhaler, Inhale 2 puffs every 4 hours if needed for wheezing., Disp: 18 g, Rfl: 0    ascorbic acid (Vitamin C) 1,000 mg tablet, Take 1 tablet (1,000 mg) by mouth once daily., Disp: , Rfl:     aspirin 81 mg EC tablet, Take 1 tablet (81 mg) by mouth once daily., Disp: , Rfl:     blood pressure monitor kit, Check BP daily, Disp: 1 kit, Rfl: 0    cholecalciferol (Vitamin D-3) 50 MCG (2000 UT) tablet, Take by mouth., Disp: , Rfl:     docusate sodium (Colace) 100 mg capsule, Take 1 capsule (100 mg) by mouth 2 times a day., Disp: 180 capsule, Rfl: 3    fluticasone (Flonase) 50 mcg/actuation nasal spray, Administer 2 sprays into each nostril once daily., Disp: 16 g, Rfl: 11    gabapentin (Neurontin) 300 mg capsule, Take 1 capsule (300 mg) by mouth 3 times a day., Disp: 90 capsule, Rfl: 5    hydroCHLOROthiazide (HYDRODiuril) 25 mg tablet, TAKE ONE TABLET BY MOUTH ONCE DAILY, Disp: 90 tablet, Rfl: 0    lancets 33 gauge misc, Use As Directed twice daily, Disp: , Rfl:     lidocaine (Xylocaine) 5 % ointment, , Disp: , Rfl:     losartan (Cozaar) 100 mg tablet, Take 1 tablet (100 mg) by mouth once daily., Disp: 90 tablet, Rfl: 0    metFORMIN  mg 24 hr tablet, Take 1 tablet (500 mg) by mouth once daily in the evening. Take with meals. Do not crush, chew, or split., Disp: 90 tablet, Rfl: 1    metoprolol tartrate (Lopressor) 25 mg tablet, Take 1 tablet (25 mg) by mouth 2 times a day., Disp: 180  tablet, Rfl: 1    omeprazole (PriLOSEC) 40 mg DR capsule, TAKE ONE CAPSULE BY MOUTH ONCE DAILY, Disp: 90 capsule, Rfl: 0    OneTouch Ultra Test strip, USE 1 STRIP --test daily; type 2 diabetes, Disp: 100 strip, Rfl: 2    Ozempic 2 mg/dose (8 mg/3 mL) pen injector, Inject 2 mg under the skin 1 (one) time per week., Disp: , Rfl:     potassium chloride CR 10 mEq ER tablet, Take one a day, Disp: 90 tablet, Rfl: 1    psyllium husk, aspartame, (Metamucil Sugar-Free, aspart,) 3.4 gram/5.8 gram powder, 2 tsp daily in 8 ounces of liquid, Disp: 1040 g, Rfl: 3    rosuvastatin (Crestor) 20 mg tablet, TAKE ONE TABLET BY MOUTH ONCE DAILY, Disp: 90 tablet, Rfl: 1    tiZANidine (Zanaflex) 2 mg tablet, TAKE ONE OR TWO TABLETS BY MOUTH AT BEDTIME, Disp: 60 tablet, Rfl: 0    vitamin E 90 mg (200 unit) capsule, Take 1 tablet by mouth once daily., Disp: , Rfl:     vitamin-B complex split tablet, Take 1 tablet (2 half tablet) by mouth once daily., Disp: , Rfl:   Allergies   Allergen Reactions    Codeine Hives, Itching and Rash    Ace Inhibitors Swelling and Other    Atorvastatin Other    Penicillin Unknown    Sulfa (Sulfonamide Antibiotics) Other and Unknown       Objective   Neurological Exam  Physical Exam    Physical Examination:    General: Alert woman who was ambulatory without assistive devices.      Mental Status: Clear sensorium without fluctuation.  Appropriate conversation.  Fluent unremarkable speech without paraphasic errors or hesitancy.    Cranial Nerves:  Funduscopic exam was not well visualized bilaterally on nondilated exam.  Pupils were equal, round and reactive to light with no relative afferent pupillary defect.  Extraocular movements were intact and conjugate without nystagmus.  No ptosis.  Visual fields were full to confrontation tested binocularly.  Facial motor function was symmetrically intact.  Hearing was grossly intact.  No dysarthria.  Shoulder shrug was symmetric.  Tongue protrusion was midline.    Motor:  Muscle bulk and tone were normal throughout. There was no pronator drift or asymmetry of finger taps. Confrontation strength was symmetrically 5/5 throughout the upper and lower extremities.     Coordination: No postural or rest tremor, myoclonus or dystonic posturing.    Sensation: Pin was symmetrically sharp over the toes without stocking gradient.  Vibration thresholds were normal at the great toes. Romberg sign was absent.     Station: Intact and stable.    Gait: Stable and unremarkable.      Assessment/Plan     Her headaches remain a minor annoyance compared to her neck pain, which is an ongoing issue although somewhat moderated after a course of physical therapy.    I advised her to continue home exercises for neck pain and I reiterated the recommendation for use of a TENS unit.  I discussed with her that unlike her other interventions (gabapentin, acetaminophen), this will not be associated with sedation/drowsiness.  I reviewed that she can use the TENS unit ad raisa. rather than needing to use it on a schedule.  I provided another order and suggested she check at her local drugstore and if they do not have it, then a medical supply store.    For ongoing neurology care I am referring her to my colleague Dr. Mario Ruiz.

## 2024-10-17 NOTE — PATIENT INSTRUCTIONS
We discussed that in addition to the physical therapy exercises you are doing for your neck, and Tylenol, I recommend trying a TENS unit.  This is an electrical stimulator with an attached adhesive pad that you place over the painful area of your neck.      You can use the TENS unit as needed for pain, and you do not have to use it daily or on a particular schedule.  You should be able to find this at the drugstore or medical supply store.    For ongoing management from a neurological standpoint I am referring you to my colleague Dr. Mario Ruiz.  It would be reasonable to see him in 6-8 months.

## 2024-10-24 ENCOUNTER — HOSPITAL ENCOUNTER (EMERGENCY)
Facility: HOSPITAL | Age: 74
Discharge: HOME | End: 2024-10-24
Payer: COMMERCIAL

## 2024-10-24 VITALS
SYSTOLIC BLOOD PRESSURE: 136 MMHG | RESPIRATION RATE: 18 BRPM | WEIGHT: 160 LBS | OXYGEN SATURATION: 98 % | HEIGHT: 60 IN | TEMPERATURE: 97.7 F | HEART RATE: 61 BPM | BODY MASS INDEX: 31.41 KG/M2 | DIASTOLIC BLOOD PRESSURE: 80 MMHG

## 2024-10-24 DIAGNOSIS — J02.0 PHARYNGITIS, STREPTOCOCCAL, ACUTE: Primary | ICD-10-CM

## 2024-10-24 LAB — S PYO DNA THROAT QL NAA+PROBE: NOT DETECTED

## 2024-10-24 PROCEDURE — 99283 EMERGENCY DEPT VISIT LOW MDM: CPT | Performed by: NURSE PRACTITIONER

## 2024-10-24 PROCEDURE — 87651 STREP A DNA AMP PROBE: CPT | Performed by: NURSE PRACTITIONER

## 2024-10-24 PROCEDURE — 2500000001 HC RX 250 WO HCPCS SELF ADMINISTERED DRUGS (ALT 637 FOR MEDICARE OP): Performed by: NURSE PRACTITIONER

## 2024-10-24 RX ORDER — NAPROXEN 500 MG/1
500 TABLET ORAL
Qty: 20 TABLET | Refills: 0 | Status: SHIPPED | OUTPATIENT
Start: 2024-10-24 | End: 2024-11-03

## 2024-10-24 RX ORDER — AZITHROMYCIN 250 MG/1
500 TABLET, FILM COATED ORAL DAILY
Qty: 8 TABLET | Refills: 0 | Status: SHIPPED | OUTPATIENT
Start: 2024-10-25 | End: 2024-10-29

## 2024-10-24 RX ORDER — NAPROXEN 500 MG/1
500 TABLET ORAL ONCE
Status: COMPLETED | OUTPATIENT
Start: 2024-10-24 | End: 2024-10-24

## 2024-10-24 RX ORDER — AZITHROMYCIN 500 MG/1
500 TABLET, FILM COATED ORAL ONCE
Status: COMPLETED | OUTPATIENT
Start: 2024-10-24 | End: 2024-10-24

## 2024-10-24 RX ADMIN — NAPROXEN 500 MG: 500 TABLET ORAL at 08:48

## 2024-10-24 RX ADMIN — AZITHROMYCIN DIHYDRATE 500 MG: 500 TABLET ORAL at 08:48

## 2024-10-24 ASSESSMENT — PAIN SCALES - GENERAL: PAINLEVEL_OUTOF10: 10 - WORST POSSIBLE PAIN

## 2024-10-24 ASSESSMENT — PAIN - FUNCTIONAL ASSESSMENT: PAIN_FUNCTIONAL_ASSESSMENT: 0-10

## 2024-10-24 NOTE — DISCHARGE INSTRUCTIONS
Please stop at the  for appointment with ENT.  I placed you on azithromycin due to your penicillin allergy.  Please use Naprosyn for pain.  Gargling with salt water can also be very helpful.  Please come back to emergency room if you develop shortness of breath or increasing pain that cannot be controlled.

## 2024-10-24 NOTE — ED PROVIDER NOTES
HPI   Chief Complaint   Patient presents with    Sore Throat       74-year-old female presents today with pharyngitis x 4 days.  She states that it is painful to swallow.  She endorses an 8 out of 10 headache.  She denies any neurologic deficit.  She denies cough.  She denies chest pain she denies nausea or vomiting.  She is not on any anticoagulant medication.  She has had this pain before but has never been this severe.      History provided by:  Patient and spouse   used: No            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    Carotidynia    Other specified soft tissue disorders 09/15/2021    Calf swelling    Personal history of diseases of the blood and blood-forming organs and certain disorders involving the immune mechanism 2021    History of anemia    Personal history of other diseases of the circulatory system 2021    History of intermittent claudication    Personal history of other diseases of the respiratory system 2022    History of chronic obstructive lung disease    Personal history of other specified conditions 2022    History of dizziness    Shortness of breath 2021    SOB (shortness of breath) on exertion     No past surgical history on file.  Family History   Problem Relation Name Age of Onset    Coronary artery disease Sister          Stent patent    Lung cancer Sister      Coronary artery disease Brother      Heart attack Brother      Suicide Attempts Daughter      Breast cancer Father's Sister  85     Social History     Tobacco Use    Smoking status: Former     Current packs/day: 0.00     Average packs/day: 1 pack/day for 33.0 years (33.0 ttl pk-yrs)     Types: Cigarettes     Start date:      Quit date: 2006     Years since quittin.8    Smokeless tobacco: Never   Substance Use Topics    Alcohol use: Yes     Alcohol/week: 1.0 standard drink of alcohol     Types: 1 Glasses of wine per  week     Comment: social    Drug use: Never       Physical Exam   ED Triage Vitals [10/24/24 0816]   Temperature Heart Rate Respirations BP   36.5 °C (97.7 °F) 66 16 136/88      Pulse Ox Temp src Heart Rate Source Patient Position   98 % -- -- --      BP Location FiO2 (%)     -- --       Physical Exam  Constitutional:       Appearance: She is well-developed.   HENT:      Head: Normocephalic and atraumatic.      Right Ear: Tympanic membrane normal.      Left Ear: Tympanic membrane normal.      Mouth/Throat:      Mouth: Mucous membranes are moist. Mucous membranes are pale.      Tonsils: 1+ on the right. 1+ on the left.   Eyes:      Extraocular Movements:      Right eye: Normal extraocular motion.      Left eye: Normal extraocular motion.      Conjunctiva/sclera: Conjunctivae normal.   Cardiovascular:      Rate and Rhythm: Normal rate and regular rhythm.      Heart sounds: Normal heart sounds.   Pulmonary:      Effort: Pulmonary effort is normal.      Breath sounds: Normal breath sounds.   Abdominal:      General: Bowel sounds are normal.      Palpations: Abdomen is soft.   Musculoskeletal:      Cervical back: Normal range of motion.   Skin:     General: Skin is warm.      Capillary Refill: Capillary refill takes less than 2 seconds.   Neurological:      General: No focal deficit present.      Mental Status: She is alert and oriented to person, place, and time.   Psychiatric:         Mood and Affect: Mood normal.         Behavior: Behavior normal.           ED Course & MDM   Diagnoses as of 10/24/24 0834   Pharyngitis, streptococcal, acute                 No data recorded                                 Medical Decision Making  Patient was swabbed for strep.  Due to her allergy with penicillin) azithromycin 500 mg for 5 days.  She received her first dose I gave her prescription for 4 more days.  She can use Naprosyn for pain.  There was no neurologic deficit remaining physical exam was  normal.        Procedure  Procedures     AN Aguilar-CNP  10/24/24 0833       AN Augilar-CNP  10/24/24 0834

## 2024-10-29 ENCOUNTER — APPOINTMENT (OUTPATIENT)
Dept: OTOLARYNGOLOGY | Facility: CLINIC | Age: 74
End: 2024-10-29
Payer: COMMERCIAL

## 2024-10-29 VITALS — WEIGHT: 160.05 LBS | HEIGHT: 60 IN | BODY MASS INDEX: 31.42 KG/M2

## 2024-10-29 DIAGNOSIS — K21.9 GASTROESOPHAGEAL REFLUX DISEASE, UNSPECIFIED WHETHER ESOPHAGITIS PRESENT: ICD-10-CM

## 2024-10-29 DIAGNOSIS — K21.9 GASTROESOPHAGEAL REFLUX DISEASE WITHOUT ESOPHAGITIS: ICD-10-CM

## 2024-10-29 DIAGNOSIS — J37.0 CHRONIC LARYNGITIS: Primary | ICD-10-CM

## 2024-10-29 DIAGNOSIS — J02.0 PHARYNGITIS, STREPTOCOCCAL, ACUTE: ICD-10-CM

## 2024-10-29 RX ORDER — DOXYCYCLINE 100 MG/1
100 CAPSULE ORAL 2 TIMES DAILY
Qty: 20 CAPSULE | Refills: 0 | Status: SHIPPED | OUTPATIENT
Start: 2024-10-29 | End: 2024-11-08

## 2024-10-29 RX ORDER — OMEPRAZOLE 40 MG/1
40 CAPSULE, DELAYED RELEASE ORAL DAILY
Qty: 90 CAPSULE | Refills: 0 | Status: SHIPPED | OUTPATIENT
Start: 2024-10-29

## 2024-10-30 ENCOUNTER — APPOINTMENT (OUTPATIENT)
Dept: NEUROLOGY | Facility: CLINIC | Age: 74
End: 2024-10-30
Payer: MEDICARE

## 2024-10-31 ENCOUNTER — APPOINTMENT (OUTPATIENT)
Dept: NEUROLOGY | Facility: CLINIC | Age: 74
End: 2024-10-31
Payer: MEDICARE

## 2024-11-12 ENCOUNTER — APPOINTMENT (OUTPATIENT)
Dept: OTOLARYNGOLOGY | Facility: CLINIC | Age: 74
End: 2024-11-12
Payer: COMMERCIAL

## 2024-11-12 VITALS — WEIGHT: 160 LBS | BODY MASS INDEX: 31.41 KG/M2 | HEIGHT: 60 IN

## 2024-11-12 DIAGNOSIS — J02.0 PHARYNGITIS, STREPTOCOCCAL, ACUTE: ICD-10-CM

## 2024-11-12 DIAGNOSIS — J37.0 CHRONIC LARYNGITIS: Primary | ICD-10-CM

## 2024-11-12 DIAGNOSIS — K21.9 GASTROESOPHAGEAL REFLUX DISEASE WITHOUT ESOPHAGITIS: ICD-10-CM

## 2024-11-12 DIAGNOSIS — K21.9 GASTROESOPHAGEAL REFLUX DISEASE, UNSPECIFIED WHETHER ESOPHAGITIS PRESENT: ICD-10-CM

## 2024-11-12 PROCEDURE — 3062F POS MACROALBUMINURIA REV: CPT | Performed by: OTOLARYNGOLOGY

## 2024-11-12 PROCEDURE — 1159F MED LIST DOCD IN RCRD: CPT | Performed by: OTOLARYNGOLOGY

## 2024-11-12 PROCEDURE — 1160F RVW MEDS BY RX/DR IN RCRD: CPT | Performed by: OTOLARYNGOLOGY

## 2024-11-12 PROCEDURE — 4010F ACE/ARB THERAPY RXD/TAKEN: CPT | Performed by: OTOLARYNGOLOGY

## 2024-11-12 PROCEDURE — 1157F ADVNC CARE PLAN IN RCRD: CPT | Performed by: OTOLARYNGOLOGY

## 2024-11-12 PROCEDURE — 1036F TOBACCO NON-USER: CPT | Performed by: OTOLARYNGOLOGY

## 2024-11-12 PROCEDURE — 3044F HG A1C LEVEL LT 7.0%: CPT | Performed by: OTOLARYNGOLOGY

## 2024-11-12 PROCEDURE — 99213 OFFICE O/P EST LOW 20 MIN: CPT | Performed by: OTOLARYNGOLOGY

## 2024-11-12 PROCEDURE — 3008F BODY MASS INDEX DOCD: CPT | Performed by: OTOLARYNGOLOGY

## 2024-11-12 RX ORDER — OMEPRAZOLE 40 MG/1
40 CAPSULE, DELAYED RELEASE ORAL DAILY
Qty: 90 CAPSULE | Refills: 3 | Status: SHIPPED | OUTPATIENT
Start: 2024-11-12

## 2024-11-12 NOTE — PROGRESS NOTES
Subjective   Patient ID: Carolina Martinez is a 74 y.o. female  HPI  Patient presents for follow-up for chronic gastroesophageal reflux with chronic laryngitis and recent strep pharyngitis.  She is feeling much better after taking the course of doxycycline and restarting the omeprazole and her throat irritation has completely resolved.    Review of Systems    Objective   Physical Exam  The oral cavity is clear.  There is no exudate noted on the pharyngeal mucosa.  Indirect mirror laryngoscopy is limited due to strong gag reflex and there is good vocal cord motion noted bilaterally.    Assessment/Plan   Diagnoses and all orders for this visit:  Chronic laryngitis (Primary)  Gastroesophageal reflux disease without esophagitis  Pharyngitis, streptococcal, acute  Gastroesophageal reflux disease, unspecified whether esophagitis present  -     omeprazole (PriLOSEC) 40 mg DR capsule; Take 1 capsule (40 mg) by mouth once daily.     1.  Chronic gastroesophageal reflux with chronic laryngitis.  Controlled with omeprazole at 40 mg/day and acid reflux precautions.  She has already undergone an EGD in 2024.  2.  Previous subjective feeling of hearing loss with otherwise normal hearing in 2022.  3.  Subacute strep pharyngitis with poor response to Zithromax resolved after treatment with doxycycline.  Her prescription was renewed and she will follow-up in 1 year.

## 2024-11-13 NOTE — PROGRESS NOTES
FUV for diabetes. LV with me 2024.    History of Present Illness   74 y.o. female with hx of type 2 diabetes, obesity class III, EMERY, HTN, and HLD.     Dx:   HbA1c: 5.6% (12/3/2024), 6.0% (2024), 5.8% (2024), 6.1% (2024), 6.0% (2023), 5.9%(2023), 6.0% (2023), 6.8% (2022), 6.6% (2022), 6.5% (2021)  Current regimen: metformin  mg Qevening, Ozempic 2 mg/week  Past medications: glipizide, Trulicity (injection site rash)  Complications: neuropathy  Comorbidities: obesity, HTN, HLD, EMERY     SMBG: once a day (fasting)  Fastins-120s     Hypoglycemia: no     Diet: dinner is biggest meal  Grazes during the day on cookies, peanuts, pieces of chicken  Dinner (*biggest meal): subway/corned beef sandwich     Exercise: nothing regular    Today:  -reports periodic episodes of depression that resolves on its own  -occurs during 2 weeks of the month    ROS  General: no fever or chills  CV: no chest pain   Respiratory: no shortness of breath  MSK: no lower extremity edema  Neuro: no headache or dizziness  See HPI for Endocrine ROS    Past Medical History:   Diagnosis Date    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    Carotidynia    Other specified soft tissue disorders 09/15/2021    Calf swelling    Personal history of diseases of the blood and blood-forming organs and certain disorders involving the immune mechanism 2021    History of anemia    Personal history of other diseases of the circulatory system 2021    History of intermittent claudication    Personal history of other diseases of the respiratory system 2022    History of chronic obstructive lung disease    Personal history of other specified conditions 2022    History of dizziness    Shortness of breath 2021    SOB (shortness of breath) on exertion       No past surgical history on file.    Social History     Socioeconomic History    Marital status:      Spouse name: Not on file    Number  of children: Not on file    Years of education: Not on file    Highest education level: Not on file   Occupational History    Not on file   Tobacco Use    Smoking status: Former     Current packs/day: 0.00     Average packs/day: 1 pack/day for 33.0 years (33.0 ttl pk-yrs)     Types: Cigarettes     Start date:      Quit date: 2006     Years since quittin.9    Smokeless tobacco: Never   Substance and Sexual Activity    Alcohol use: Yes     Alcohol/week: 1.0 standard drink of alcohol     Types: 1 Glasses of wine per week     Comment: social    Drug use: Never    Sexual activity: Not on file   Other Topics Concern    Not on file   Social History Narrative    Not on file     Social Drivers of Health     Financial Resource Strain: Not on file   Food Insecurity: No Food Insecurity (2024)    Hunger Vital Sign     Worried About Running Out of Food in the Last Year: Never true     Ran Out of Food in the Last Year: Never true   Transportation Needs: Not on file   Physical Activity: Not on file   Stress: Not on file   Social Connections: Not on file   Intimate Partner Violence: Not on file   Housing Stability: Not on file       Physical Exam   height is 1.524 m (5') and weight is 73 kg (161 lb). Her tympanic temperature is 36 °C (96.8 °F). Her blood pressure is 130/80 and her pulse is 74. Her respiration is 17.   General: not in acute distress  HEENT: CARMENCITA, EOMI  Thyroid: no goiter  Neuro: alert and oriented x 3    Current Outpatient Medications   Medication Sig Dispense Refill    albuterol (Ventolin HFA) 90 mcg/actuation inhaler Inhale 2 puffs every 4 hours if needed for wheezing or shortness of breath. 8 g 5    ascorbic acid (Vitamin C) 1,000 mg tablet Take 1 tablet (1,000 mg) by mouth once daily.      aspirin 81 mg EC tablet Take 1 tablet (81 mg) by mouth once daily.      blood pressure monitor kit Check BP daily 1 kit 0    cholecalciferol (Vitamin D-3) 50 MCG (2000 UT) tablet Take by mouth.      docusate  sodium (Colace) 100 mg capsule Take 1 capsule (100 mg) by mouth 2 times a day. 180 capsule 3    fluticasone (Flonase) 50 mcg/actuation nasal spray Administer 2 sprays into each nostril once daily. 16 g 11    hydroCHLOROthiazide (HYDRODiuril) 25 mg tablet TAKE ONE TABLET BY MOUTH ONCE DAILY 90 tablet 0    lancets 33 gauge misc Use As Directed twice daily      lidocaine (Xylocaine) 5 % ointment       losartan (Cozaar) 100 mg tablet TAKE ONE TABLET BY MOUTH ONCE DAILY 90 tablet 0    metFORMIN  mg 24 hr tablet TAKE ONE TABLET BY MOUTH ONCE DAILY IN THE EVENING WITH MEALS. DO NOT CRUSH, CHEW OR SPLIT 90 tablet 0    metoprolol tartrate (Lopressor) 25 mg tablet TAKE ONE TABLET BY MOUTH TWICE DAILY 180 tablet 0    omeprazole (PriLOSEC) 40 mg DR capsule Take 1 capsule (40 mg) by mouth once daily. 90 capsule 3    OneTouch Ultra Test strip USE ONE STRIP TO TEST DAILY 100 strip 0    Ozempic 2 mg/dose (8 mg/3 mL) pen injector Inject 2 mg under the skin 1 (one) time per week.      potassium chloride CR 10 mEq ER tablet Take one a day 90 tablet 1    psyllium husk, aspartame, (Metamucil Sugar-Free, aspart,) 3.4 gram/5.8 gram powder 2 tsp daily in 8 ounces of liquid 1040 g 3    rosuvastatin (Crestor) 20 mg tablet TAKE ONE TABLET BY MOUTH ONCE DAILY 90 tablet 1    tiZANidine (Zanaflex) 2 mg tablet TAKE ONE OR TWO TABLETS BY MOUTH AT BEDTIME 60 tablet 0    vitamin E 90 mg (200 unit) capsule Take 1 tablet by mouth once daily.      vitamin-B complex split tablet Take 1 tablet (2 half tablet) by mouth once daily.      albuterol 90 mcg/actuation inhaler Inhale 2 puffs every 4 hours if needed for wheezing. 18 g 0    gabapentin (Neurontin) 300 mg capsule Take 1 capsule (300 mg) by mouth 3 times a day. 90 capsule 5     No current facility-administered medications for this visit.       Assessment and Plan  74 y.o. female with hx of type 2 diabetes, obesity class III, EMERY, HTN, and HLD, here for management of diabetes and obesity.     1.  Type 2 diabetes mellitus, controlled  2. Class III obesity  HbA1c: 5.6% (12/3/2024), 6.0% (07/2024), 6.1% (04/2024), 6.0% 11/29/2023), 5.9% (7/18/2023), 6.0% (03/2023), 6.8% (9/7/2022), 6.6% (5/17/2022), 6.5% (09/2021)  Current regimen: metformin 500 mg Qevening, Ozempic 2 mg/week  Eye exam: no DR  (10/2024)  Urine microalbumin: neg (04/2024)  Podiatry: LV 10/2024  Lipids: HDL NA, LDL 59, TG 95 (04/2024) on rosuvastatin 20 mg     A1c: 5.6% today!  No need for any regimen changes.    Will reapply for [facility] PAP.  She is OK with picking up the Ozempic at either the main campus or [facility].    Reported periodic episodes of depressive feeling and mood swings.  Occurs for about 2 weeks out of the month and resolves on its own.  Wondering if this is post-menopause related. She also has hot flashes. Provided patient with referral number for the  menopause clinic.    PLAN:  -continue Ozempic 2 mg/week   -continue metformin  mg Qevening  -check blood sugars once a day (alternate fasting and before dinner)  -check HbA1c, lipids, urine microalbumin before next visit    Follow-up in 6 months  (labs prior)

## 2024-11-19 DIAGNOSIS — E11.9 TYPE 2 DIABETES MELLITUS WITHOUT COMPLICATION, WITHOUT LONG-TERM CURRENT USE OF INSULIN (MULTI): ICD-10-CM

## 2024-11-19 DIAGNOSIS — E11.9 CONTROLLED TYPE 2 DIABETES MELLITUS WITHOUT COMPLICATION, UNSPECIFIED WHETHER LONG TERM INSULIN USE (MULTI): ICD-10-CM

## 2024-11-19 DIAGNOSIS — I10 HYPERTENSION, UNSPECIFIED TYPE: ICD-10-CM

## 2024-11-20 RX ORDER — HYDROCHLOROTHIAZIDE 25 MG/1
25 TABLET ORAL DAILY
Qty: 90 TABLET | Refills: 0 | Status: SHIPPED | OUTPATIENT
Start: 2024-11-20

## 2024-11-20 RX ORDER — LOSARTAN POTASSIUM 100 MG/1
100 TABLET ORAL DAILY
Qty: 90 TABLET | Refills: 0 | Status: SHIPPED | OUTPATIENT
Start: 2024-11-20

## 2024-11-20 RX ORDER — METFORMIN HYDROCHLORIDE 500 MG/1
TABLET, EXTENDED RELEASE ORAL
Qty: 90 TABLET | Refills: 0 | Status: SHIPPED | OUTPATIENT
Start: 2024-11-20

## 2024-11-20 RX ORDER — BLOOD SUGAR DIAGNOSTIC
STRIP MISCELLANEOUS
Qty: 100 STRIP | Refills: 0 | Status: SHIPPED | OUTPATIENT
Start: 2024-11-20

## 2024-11-20 RX ORDER — METOPROLOL TARTRATE 25 MG/1
25 TABLET, FILM COATED ORAL 2 TIMES DAILY
Qty: 180 TABLET | Refills: 0 | Status: SHIPPED | OUTPATIENT
Start: 2024-11-20

## 2024-12-03 ENCOUNTER — APPOINTMENT (OUTPATIENT)
Dept: ENDOCRINOLOGY | Facility: CLINIC | Age: 74
End: 2024-12-03
Payer: MEDICARE

## 2024-12-03 VITALS
RESPIRATION RATE: 17 BRPM | BODY MASS INDEX: 31.61 KG/M2 | DIASTOLIC BLOOD PRESSURE: 80 MMHG | SYSTOLIC BLOOD PRESSURE: 130 MMHG | HEART RATE: 74 BPM | TEMPERATURE: 96.8 F | HEIGHT: 60 IN | WEIGHT: 161 LBS

## 2024-12-03 DIAGNOSIS — Z00.00 HEALTHCARE MAINTENANCE: ICD-10-CM

## 2024-12-03 DIAGNOSIS — E11.65 TYPE 2 DIABETES MELLITUS WITH HYPERGLYCEMIA, WITHOUT LONG-TERM CURRENT USE OF INSULIN: Primary | ICD-10-CM

## 2024-12-03 LAB — POC HEMOGLOBIN A1C: 5.6 % (ref 4.2–6.5)

## 2024-12-03 PROCEDURE — 83036 HEMOGLOBIN GLYCOSYLATED A1C: CPT | Performed by: STUDENT IN AN ORGANIZED HEALTH CARE EDUCATION/TRAINING PROGRAM

## 2024-12-03 PROCEDURE — 3062F POS MACROALBUMINURIA REV: CPT | Performed by: STUDENT IN AN ORGANIZED HEALTH CARE EDUCATION/TRAINING PROGRAM

## 2024-12-03 PROCEDURE — 3044F HG A1C LEVEL LT 7.0%: CPT | Performed by: STUDENT IN AN ORGANIZED HEALTH CARE EDUCATION/TRAINING PROGRAM

## 2024-12-03 PROCEDURE — 1036F TOBACCO NON-USER: CPT | Performed by: STUDENT IN AN ORGANIZED HEALTH CARE EDUCATION/TRAINING PROGRAM

## 2024-12-03 PROCEDURE — 3008F BODY MASS INDEX DOCD: CPT | Performed by: STUDENT IN AN ORGANIZED HEALTH CARE EDUCATION/TRAINING PROGRAM

## 2024-12-03 PROCEDURE — 1126F AMNT PAIN NOTED NONE PRSNT: CPT | Performed by: STUDENT IN AN ORGANIZED HEALTH CARE EDUCATION/TRAINING PROGRAM

## 2024-12-03 PROCEDURE — 1159F MED LIST DOCD IN RCRD: CPT | Performed by: STUDENT IN AN ORGANIZED HEALTH CARE EDUCATION/TRAINING PROGRAM

## 2024-12-03 PROCEDURE — 4010F ACE/ARB THERAPY RXD/TAKEN: CPT | Performed by: STUDENT IN AN ORGANIZED HEALTH CARE EDUCATION/TRAINING PROGRAM

## 2024-12-03 PROCEDURE — 3075F SYST BP GE 130 - 139MM HG: CPT | Performed by: STUDENT IN AN ORGANIZED HEALTH CARE EDUCATION/TRAINING PROGRAM

## 2024-12-03 PROCEDURE — G2211 COMPLEX E/M VISIT ADD ON: HCPCS | Performed by: STUDENT IN AN ORGANIZED HEALTH CARE EDUCATION/TRAINING PROGRAM

## 2024-12-03 PROCEDURE — 99214 OFFICE O/P EST MOD 30 MIN: CPT | Performed by: STUDENT IN AN ORGANIZED HEALTH CARE EDUCATION/TRAINING PROGRAM

## 2024-12-03 PROCEDURE — 1157F ADVNC CARE PLAN IN RCRD: CPT | Performed by: STUDENT IN AN ORGANIZED HEALTH CARE EDUCATION/TRAINING PROGRAM

## 2024-12-03 PROCEDURE — 3079F DIAST BP 80-89 MM HG: CPT | Performed by: STUDENT IN AN ORGANIZED HEALTH CARE EDUCATION/TRAINING PROGRAM

## 2024-12-03 ASSESSMENT — PATIENT HEALTH QUESTIONNAIRE - PHQ9
SUM OF ALL RESPONSES TO PHQ9 QUESTIONS 1 AND 2: 0
1. LITTLE INTEREST OR PLEASURE IN DOING THINGS: NOT AT ALL
2. FEELING DOWN, DEPRESSED OR HOPELESS: NOT AT ALL

## 2024-12-03 ASSESSMENT — PAIN SCALES - GENERAL: PAINLEVEL_OUTOF10: 0-NO PAIN

## 2024-12-03 NOTE — PATIENT INSTRUCTIONS
Menopause Clinic: #[phone]    *Please complete patient assistance program application for Ozempic and drop off at the *

## 2024-12-09 ENCOUNTER — OFFICE VISIT (OUTPATIENT)
Dept: PULMONOLOGY | Facility: HOSPITAL | Age: 74
End: 2024-12-09
Payer: MEDICARE

## 2024-12-09 VITALS
OXYGEN SATURATION: 97 % | DIASTOLIC BLOOD PRESSURE: 80 MMHG | TEMPERATURE: 97.3 F | SYSTOLIC BLOOD PRESSURE: 124 MMHG | BODY MASS INDEX: 31.25 KG/M2 | HEART RATE: 82 BPM | WEIGHT: 160 LBS

## 2024-12-09 DIAGNOSIS — J30.9 ALLERGIC RHINITIS, UNSPECIFIED SEASONALITY, UNSPECIFIED TRIGGER: ICD-10-CM

## 2024-12-09 DIAGNOSIS — R06.09 DYSPNEA ON EXERTION: Primary | ICD-10-CM

## 2024-12-09 DIAGNOSIS — R91.8 MULTIPLE LUNG NODULES ON CT: ICD-10-CM

## 2024-12-09 PROCEDURE — 3062F POS MACROALBUMINURIA REV: CPT | Performed by: NURSE PRACTITIONER

## 2024-12-09 PROCEDURE — 3044F HG A1C LEVEL LT 7.0%: CPT | Performed by: NURSE PRACTITIONER

## 2024-12-09 PROCEDURE — 1126F AMNT PAIN NOTED NONE PRSNT: CPT | Performed by: NURSE PRACTITIONER

## 2024-12-09 PROCEDURE — 3074F SYST BP LT 130 MM HG: CPT | Performed by: NURSE PRACTITIONER

## 2024-12-09 PROCEDURE — 1157F ADVNC CARE PLAN IN RCRD: CPT | Performed by: NURSE PRACTITIONER

## 2024-12-09 PROCEDURE — 1159F MED LIST DOCD IN RCRD: CPT | Performed by: NURSE PRACTITIONER

## 2024-12-09 PROCEDURE — 99213 OFFICE O/P EST LOW 20 MIN: CPT | Performed by: NURSE PRACTITIONER

## 2024-12-09 PROCEDURE — 1036F TOBACCO NON-USER: CPT | Performed by: NURSE PRACTITIONER

## 2024-12-09 PROCEDURE — 3079F DIAST BP 80-89 MM HG: CPT | Performed by: NURSE PRACTITIONER

## 2024-12-09 PROCEDURE — 4010F ACE/ARB THERAPY RXD/TAKEN: CPT | Performed by: NURSE PRACTITIONER

## 2024-12-09 RX ORDER — ALBUTEROL SULFATE 90 UG/1
2 INHALANT RESPIRATORY (INHALATION) EVERY 4 HOURS PRN
Qty: 8 G | Refills: 5 | Status: SHIPPED | OUTPATIENT
Start: 2024-12-09 | End: 2025-12-09

## 2024-12-09 ASSESSMENT — ENCOUNTER SYMPTOMS
FEVER: 0
NUMBNESS: 0
DIARRHEA: 0
DIZZINESS: 1
BACK PAIN: 1
FATIGUE: 0
NAUSEA: 0
PALPITATIONS: 0
ARTHRALGIAS: 1
NERVOUS/ANXIOUS: 1
HEADACHES: 1
SINUS PRESSURE: 1
WEAKNESS: 0
AGITATION: 0
RHINORRHEA: 0
EYE PAIN: 0
VOMITING: 0
ABDOMINAL PAIN: 1
MYALGIAS: 1
JOINT SWELLING: 0
VOICE CHANGE: 0

## 2024-12-09 ASSESSMENT — LIFESTYLE VARIABLES
SKIP TO QUESTIONS 9-10: 1
HOW OFTEN DO YOU HAVE A DRINK CONTAINING ALCOHOL: MONTHLY OR LESS
HOW OFTEN DO YOU HAVE SIX OR MORE DRINKS ON ONE OCCASION: NEVER
HOW MANY STANDARD DRINKS CONTAINING ALCOHOL DO YOU HAVE ON A TYPICAL DAY: 1 OR 2
AUDIT-C TOTAL SCORE: 1

## 2024-12-09 ASSESSMENT — PAIN SCALES - GENERAL: PAINLEVEL_OUTOF10: 0-NO PAIN

## 2024-12-09 NOTE — PATIENT INSTRUCTIONS
Dyspnea on exertion: PFTs in 2022 without obstruction - previously on trelegy, but stopped.   - continue albuterol 2 puffs every 4-6 hours as needed     2. Allergic rhinitis:   - continue  flonase 1 spray each nostril daily -- aim towards your ear     3. Pulmonary nodules: former smoker - > 15 years ago. Family hx of lung cancer. Majority of nodules stable from 2021 -- LLL nodules/atelectasis slightly increased in 4/2023.   - Nodules all stable for 2 years - no further follow up needed     Thank you for visiting the Pulmonary clinic today!   Return to clinic or sooner as needed    Isabel Montoya CNP  My office -  [phone]- [facility] is my . Cecy is my nurse [phone].   Radiology scheduling [phone]   Appointment scheduling [phone]   Pulmonary function testing - [phone]

## 2024-12-09 NOTE — PROGRESS NOTES
Patient: Carolina Martinez    [id number]  : 1950 -- AGE 74 y.o.    Provider: AN Bryson-CNP     Location [facility]   Service Date: 2024              [facility]  Follow up visit note      HISTORY OF PRESENT ILLNESS       HISTORY OF PRESENT ILLNESS     Since last visit she has not really needed the albuterol. She states since she has lost weight she has really not had much issue. She has some coughing, but feels it may be from the throat. She has some congestion on her left nostril. She states her apartment complex has a lot of people smoking marijuana. She denies any wheezing, OVERTON, SOB at rest, or CP. She has occasional GERD symptoms despite taking omeprazole daily. She has been wearing her CPAP (has 2L bled in) at night. She uses her flonase and feels it helps with congestion.       24: On today's visit, the patient reports she rarely needs to use inhalers. She has a sample of breztri with her, but does not remember the last time she used it.   She has small episodes of her left nostril getting blocked. States if she blows her nose - then it clears. She states she has a rare dry cough - more of a tickle in her throat.  She rarely notices wheezing. She was told in the ED a year or two ago that she has chronic bronchitis. She sometimes will notice OVERTON going up the stairs -- has lost weight and she feels this has helped. She has been on ozempic and she has found it helpful. She denies any SOB at rest or CP. She feels her GERD is under good control on daily omeprazole.  She is using flonase daily for allergies with good control. She is currently out of her flonase - has more congestion since.     Dr. José - 23   Interval HPI:  Doing well  Breathing is well  Currently on ozempic for diabetes.   Interval labs and radiology:  CT scan of the chest 23 - stable lung nodules 2- 6 mm, unchanged from .  --  72-year-old AA  female, ex smoker (30-pack year smoker, quit in 2006), EMERY on CPAP, HTN, hyperlipidemia, DM2 (on metformin), h/o COVID in Dec 2020, carotid artery wall thickening (post covid on prednisone) here for evaluation of respiratory symptoms.  Work up so far:  PFTs 4/15/21 - FEV1 80%, %, DLCO 61%  Echo - 3/25/21 - impaired relax, normal LV and RV, RVSP 41  CT cardiac scoring - multiple lung nodules <6 mm  Nuclear stress test - 5/6/21 - normal  CT angio 3/4/2021 - resolution of lung opacities.  CT scan of the chest 4/2022 - stable lung nodules, pleural nodules <6 mm.  PFT 4/22: FEV1 84%, TLC 86%, DLCO 61% (Stable)  3/25/22: 6MWD - 101% 358m  9/27/22: Repeat echo - EF 65%, diastology indeterminate, no valvular issues, RVSP 20.3  4/20/23: CT scan of the chest 4/20/23 - stable lung nodules 2- 6 mm, unchanged from 2022.    Sleep: EMERY on CPAP -- has oxygen bled in (not sure how much)     ALLERGIES AND MEDICATIONS     ALLERGIES  Allergies   Allergen Reactions    Codeine Hives, Itching and Rash    Ace Inhibitors Swelling and Other    Atorvastatin Other    Penicillin Unknown    Sulfa (Sulfonamide Antibiotics) Other and Unknown       MEDICATIONS  Current Outpatient Medications   Medication Sig Dispense Refill    albuterol (Ventolin HFA) 90 mcg/actuation inhaler Inhale 2 puffs every 4 hours if needed for wheezing or shortness of breath. 8 g 5    ascorbic acid (Vitamin C) 1,000 mg tablet Take 1 tablet (1,000 mg) by mouth once daily.      aspirin 81 mg EC tablet Take 1 tablet (81 mg) by mouth once daily.      blood pressure monitor kit Check BP daily 1 kit 0    cholecalciferol (Vitamin D-3) 50 MCG (2000 UT) tablet Take by mouth.      docusate sodium (Colace) 100 mg capsule Take 1 capsule (100 mg) by mouth 2 times a day. 180 capsule 3    fluticasone (Flonase) 50 mcg/actuation nasal spray Administer 2 sprays into each nostril once daily. 16 g 11    hydroCHLOROthiazide (HYDRODiuril) 25 mg tablet TAKE ONE TABLET BY MOUTH ONCE DAILY  90 tablet 0    lancets 33 gauge misc Use As Directed twice daily      lidocaine (Xylocaine) 5 % ointment       losartan (Cozaar) 100 mg tablet TAKE ONE TABLET BY MOUTH ONCE DAILY 90 tablet 0    metFORMIN  mg 24 hr tablet TAKE ONE TABLET BY MOUTH ONCE DAILY IN THE EVENING WITH MEALS. DO NOT CRUSH, CHEW OR SPLIT 90 tablet 0    metoprolol tartrate (Lopressor) 25 mg tablet TAKE ONE TABLET BY MOUTH TWICE DAILY 180 tablet 0    omeprazole (PriLOSEC) 40 mg DR capsule Take 1 capsule (40 mg) by mouth once daily. 90 capsule 3    OneTouch Ultra Test strip USE ONE STRIP TO TEST DAILY 100 strip 0    Ozempic 2 mg/dose (8 mg/3 mL) pen injector Inject 2 mg under the skin 1 (one) time per week.      psyllium husk, aspartame, (Metamucil Sugar-Free, aspart,) 3.4 gram/5.8 gram powder 2 tsp daily in 8 ounces of liquid 1040 g 3    rosuvastatin (Crestor) 20 mg tablet TAKE ONE TABLET BY MOUTH ONCE DAILY 90 tablet 1    tiZANidine (Zanaflex) 2 mg tablet TAKE ONE OR TWO TABLETS BY MOUTH AT BEDTIME 60 tablet 0    vitamin E 90 mg (200 unit) capsule Take 1 tablet by mouth once daily.      vitamin-B complex split tablet Take 1 tablet (2 half tablet) by mouth once daily.      albuterol 90 mcg/actuation inhaler Inhale 2 puffs every 4 hours if needed for wheezing. 18 g 0    gabapentin (Neurontin) 300 mg capsule Take 1 capsule (300 mg) by mouth 3 times a day. 90 capsule 5    potassium chloride CR 10 mEq ER tablet Take one a day 90 tablet 1     No current facility-administered medications for this visit.         PAST HISTORY     PAST MEDICAL HISTORY  - EMERY - on CPAP   - HTN   - HLD   - DMII   - GERD   - cataracts     PAST SURGICAL HISTORY  No past surgical history on file.    IMMUNIZATION HISTORY  Immunization History   Administered Date(s) Administered    Flu vaccine, quadrivalent, high-dose, preservative free, age 65y+ (FLUZONE) 11/01/2022    Flu vaccine, trivalent, preservative free, HIGH-DOSE, age 65y+ (Fluzone) 11/08/2016, 11/13/2017,  10/08/2019    Flu vaccine, trivalent, preservative free, age 6 months and greater (Fluarix/Fluzone/Flulaval) 10/07/2014, 10/19/2015    Influenza, Seasonal, Quadrivalent, Adjuvanted 09/15/2021, 2023    Influenza, seasonal, injectable 10/28/2010, 10/22/2013    Influenza, trivalent, adjuvanted 10/17/2018    Pfizer COVID-19 vaccine, 12 years and older, (30mcg/0.3mL) (Comirnaty) 2024    Pfizer Purple Cap SARS-CoV-2 2021, 2021, 2021    Pneumococcal conjugate vaccine, 13-valent (PREVNAR 13) 2018    Pneumococcal conjugate vaccine, 20-valent (PREVNAR 20) 2024    Pneumococcal polysaccharide vaccine, 23-valent, age 2 years and older (PNEUMOVAX 23) 2010, 10/22/2013, 2017    RESPIRATORY SYNCYTIAL VIRUS (RSV), ELIGIBLE PREGNANT PTS, 0.5 ML (ABRYSVO) 2024    Zoster vaccine, recombinant, adult (SHINGRIX) 2022, 2022       SOCIAL HISTORY  Smokin-56 - 12 cigarettes ~24 pack years   Alcohol: one drink per week    Illicit drugs:  none     OCCUPATIONAL/ENVIRONMENTAL HISTORY  Retired -  previously did more manual labor jobs/ lifting. Some dust exposure     FAMILY HISTORY  Sister -  lung cancer and COPD    RESULTS/DATA     Pulmonary Function Test Results     3/31/10 - FEV1/FVC 0.83/ FEV1 1.13 (63%)/ FVC 1.48 (67%)/ TLC 3.59 (103%)/ DLCO 62%   3/25/22 - FEV1/ FVC 0.73/ FEV1 1.39 (84%)/ FVC 1.76 (84%)/ TLC 3.17 (86%)/ DLCO 61%     6MWT - 3/25/22 - 358m () 98-92% - NOE 0     Chest Radiograph     XR chest 1 view 2023  Impression  No acute cardiopulmonary process.      Chest CT Scan     20 - Patchy peripheral areas of septal thickening and ground-glass type infiltrates. Pneumonia including COVID pneumonia should be considered.    20 -Limited evaluation due to suboptimal timing of the contrast bolus. Within those limitations, no evidence of any central pulmonary embolism to the level of the patricia. 2. Significant interval improvement in bilateral  patchy ground-glass opacities suggestive of resolving pneumonia when compared to recent chest CT from 12/08/2020.    3/4/21 - No acute pulmonary embolus to the segmental level.   No CT evidence for an acute intrathoracic process.    3/14/22 -  No acute cardiopulmonary process. 2. Numerous solid pulmonary nodules within the bilateral lungs measuring up to 0.6 cm which are stable when compared to exam dated 03/04/2021. If patient is high risk, may consider CT follow-up in 1 year to re-evaluate stability. 3. Stable appearance of a 1.4 cm nodular opacity within the right breast soft tissues. Correlate with mammography recommended 4. Moderate coronary artery calcifications. 5. Small sliding hiatal hernia.    4/20/23 - Multiple unchanged 2-6 mm pulmonary nodules, since 2022. No new nodules identified. No thoracic adenopathy.   A non-calcified pulmonary nodule/ multiple non-calcified pulmonary nodules measuring less than 6 mm, likely benign. No further follow-up is required, however, if the patient has high risk factors for primary lung malignancy, follow-up noncontrast CT scan chest in 12 months may be obtained.     8/7/24 - Numerous bilateral pulmonary and pleural-based nodules of 6 mm or  less have not changed significantly since at least 2009, consistent  with benign nodules.  2. No acute abnormality is noted.        Echocardiogram     3/25/21 -  The left ventricular systolic function is normal with a 55% estimated ejection fraction.   2. Spectral Doppler shows an impaired relaxation pattern of left ventricular diastolic filling.   3. TAPSE normal.   4. Mildly elevated RVSP.  RA normal size, RV normal size and function     9/27/22 -  Left ventricular systolic function is normal with a 55-60% estimated ejection fraction.   2. No significant valvular abnormalities. RA normal size, RV normal size and function     Labs/ Other testing      Eosinophils Absolute   Date Value   08/27/2024 0.03 x10*3/uL   09/15/2022 0.10 K/UL  "  12/30/2021 0.09 x10E9/L   09/03/2021 0.03 x10E9/L     No results found for: \"IGE\"    REVIEW OF SYSTEMS     REVIEW OF SYSTEMS  Review of Systems   Constitutional:  Negative for fatigue and fever.   HENT:  Positive for sinus pressure. Negative for congestion, postnasal drip, rhinorrhea and voice change.    Eyes:  Negative for pain and visual disturbance.   Cardiovascular:  Negative for chest pain, palpitations and leg swelling.   Gastrointestinal:  Positive for abdominal pain. Negative for diarrhea, nausea and vomiting.   Endocrine: Negative for cold intolerance and heat intolerance.   Musculoskeletal:  Positive for arthralgias, back pain and myalgias. Negative for joint swelling.   Skin:  Negative for rash.   Neurological:  Positive for dizziness and headaches. Negative for weakness and numbness.   Psychiatric/Behavioral:  Negative for agitation. The patient is nervous/anxious.         + depression          PHYSICAL EXAM     VITAL SIGNS: /80   Pulse 82   Temp 36.3 °C (97.3 °F)   Wt 72.6 kg (160 lb)   LMP  (LMP Unknown)   SpO2 97% Comment: RA  BMI 31.25 kg/m²      CURRENT WEIGHT: [unfilled]  BMI: [unfilled]  PREVIOUS WEIGHTS:  Wt Readings from Last 3 Encounters:   12/09/24 72.6 kg (160 lb)   12/03/24 73 kg (161 lb)   11/12/24 72.6 kg (160 lb)       Physical Exam  Vitals reviewed.   Constitutional:       General: She is not in acute distress.     Appearance: Normal appearance. She is not ill-appearing or toxic-appearing.   HENT:      Head: Normocephalic.      Nose: No rhinorrhea.   Cardiovascular:      Rate and Rhythm: Normal rate and regular rhythm.      Heart sounds: Normal heart sounds.   Pulmonary:      Effort: Pulmonary effort is normal. No respiratory distress.      Breath sounds: Normal breath sounds. No stridor. No wheezing, rhonchi or rales.   Abdominal:      General: Abdomen is flat.   Musculoskeletal:         General: Normal range of motion.      Right lower leg: No edema.      Left lower leg: No " edema.   Skin:     General: Skin is warm and dry.      Nails: There is no clubbing.   Neurological:      General: No focal deficit present.      Mental Status: She is alert and oriented to person, place, and time.   Psychiatric:         Mood and Affect: Mood normal.         Behavior: Behavior normal.         Judgment: Judgment normal.         ASSESSMENT/PLAN     Dyspnea on exertion: PFTs in 2022 without obstruction - previously on trelegy, but stopped.   - continue albuterol 2 puffs every 4-6 hours as needed     2. Allergic rhinitis:   - continue  flonase 1 spray each nostril daily -- aim towards your ear     3. Pulmonary nodules: former smoker - > 15 years ago. Family hx of lung cancer. Majority of nodules stable from 2021 -- LLL nodules/atelectasis slightly increased in 4/2023.   - Nodules all stable for 2 years - no further follow up needed     Thank you for visiting the Pulmonary clinic today!   Return to clinic or sooner as needed    Isabel Montoya CNP  My office -  [phone]- [facility] is my . Cecy is my nurse [phone].   Radiology scheduling [phone]   Appointment scheduling [phone]   Pulmonary function testing - [phone]

## 2024-12-31 ENCOUNTER — APPOINTMENT (OUTPATIENT)
Dept: ORTHOPEDIC SURGERY | Facility: CLINIC | Age: 74
End: 2024-12-31
Payer: MEDICARE

## 2024-12-31 ENCOUNTER — HOSPITAL ENCOUNTER (OUTPATIENT)
Dept: RADIOLOGY | Facility: EXTERNAL LOCATION | Age: 74
Discharge: HOME | End: 2024-12-31

## 2024-12-31 DIAGNOSIS — M16.12 ARTHRITIS OF LEFT HIP: ICD-10-CM

## 2024-12-31 PROCEDURE — 1159F MED LIST DOCD IN RCRD: CPT | Performed by: FAMILY MEDICINE

## 2024-12-31 PROCEDURE — 4010F ACE/ARB THERAPY RXD/TAKEN: CPT | Performed by: FAMILY MEDICINE

## 2024-12-31 PROCEDURE — 1157F ADVNC CARE PLAN IN RCRD: CPT | Performed by: FAMILY MEDICINE

## 2024-12-31 PROCEDURE — 20611 DRAIN/INJ JOINT/BURSA W/US: CPT | Performed by: FAMILY MEDICINE

## 2024-12-31 PROCEDURE — 1036F TOBACCO NON-USER: CPT | Performed by: FAMILY MEDICINE

## 2024-12-31 PROCEDURE — 99213 OFFICE O/P EST LOW 20 MIN: CPT | Performed by: FAMILY MEDICINE

## 2024-12-31 PROCEDURE — 3062F POS MACROALBUMINURIA REV: CPT | Performed by: FAMILY MEDICINE

## 2024-12-31 PROCEDURE — 3044F HG A1C LEVEL LT 7.0%: CPT | Performed by: FAMILY MEDICINE

## 2024-12-31 RX ORDER — LIDOCAINE HYDROCHLORIDE 10 MG/ML
4 INJECTION, SOLUTION INFILTRATION; PERINEURAL
Status: COMPLETED | OUTPATIENT
Start: 2024-12-31 | End: 2024-12-31

## 2024-12-31 RX ORDER — TRIAMCINOLONE ACETONIDE 40 MG/ML
80 INJECTION, SUSPENSION INTRA-ARTICULAR; INTRAMUSCULAR
Status: COMPLETED | OUTPATIENT
Start: 2024-12-31 | End: 2024-12-31

## 2024-12-31 RX ADMIN — TRIAMCINOLONE ACETONIDE 80 MG: 40 INJECTION, SUSPENSION INTRA-ARTICULAR; INTRAMUSCULAR at 07:55

## 2024-12-31 RX ADMIN — LIDOCAINE HYDROCHLORIDE 4 ML: 10 INJECTION, SOLUTION INFILTRATION; PERINEURAL at 07:55

## 2024-12-31 NOTE — PROGRESS NOTES
** Please excuse any errors in grammar or translation related to this dictation. Voice recognition software was utilized to prepare this document. **    Assessment & Plan:  Patient here for ongoing management of left hip arthritis.  Overall condition is stable.  Non-operative treatment options reviewed below.  - Maintaining a healthy weight: Every pound of bodyweight is about 4-5 pounds through the lower extremity.   - Exercise program to strengthen supportive musculature.    - Activity modifications as needed to include use of cane/walker or braces.  - Steroid injection.  - After failing non-operative measures, may ultimately require arthroplasty.  Patient not ready to pursue at this time.    Informed patient that steroid injection can be repeated every 3 or more months as symptoms dictate.  All questions answered and patient is agreeable to this plan.     Updated left hip x-rays do at next visit.    Chief complaint: L hip pain    HPI:  12/31/24:  Patient reports the injection from 9/24 provided close to 3 months relief.  Denies any new injuries.  She has pain in the hip 1-2 times per week.     9/24/24:  Following up with L hip pain secondary to OA.  Patient reports injection from 6/18 provided intermittent relief up until now.  Pain was never very severe.  Denies any new injuries. Tylenol, ibuprofen, and gabapentin as needed.     6/18/24: Patient reports injection from 3/12 provided relief up until the last week.  Denies any new injuries.  Takes tylenol or ibuprofen as needed.     3/12/24:  Patient reports injection on 12/12/23 provided 3 months of relief. She only started having pain the last few days.  Denies any new injuries. Notes that she is using Ozempic for weight loss and that this has helped lessen the severity of her symptoms.  Will take Tylenol or naproxen intermittently for breakthrough symptoms.     12/12/23:  Patient reports injection on 9/12 provided close to the 3 months relief. Occasional use of  Tylenol if needed for breakthrough pain but this is infrequent.   Denies any new injuries.    9/12/23: F/u for left hip arthritis management. Patient reports injection on 6/15 provided close to 3 months relief. No reported progression in symptoms. She is requesting another injection.      6/15/23: Follow-up for left hip pain secondary to arthritis. Reports that steroid injection helped provide pain relief for nearly 3 months. Physical therapy has also been helping with her strengthening. States her therapist told her her range of motion of her hip is also been improving.      3/15/23: 71 y/o F, hx of obesity, DM and HTN, presents with left hip pain. Pain has been intermittently occurring for the past 4-5 years. Most recent pain has been continuous for 4 weeks. Pain is present in lateral hip and into groin. Pain has reduced her ROM. Pain increases with activity along with quick changes in weather temperature. Has been managing with otc analgesics Tylenol and Aleve which do not help at all. No previous injections or therapy for hip.      Patient Active Problem List   Diagnosis    Allergic rhinitis    Anxiety and depression    Arthritis of left hip    Diabetic peripheral neuropathy (Multi)    Elevated coronary artery calcium score    Fatty liver    Cervicogenic headache    Hearing loss    History of herpes genitalis    Hyperlipidemia    Hypertension    Abnormal findings on esophagogastroduodenoscopy (EGD)    Irritable bowel syndrome with constipation    Mild cognitive impairment    Multiple lung nodules on CT    Neuropathic pain of both feet    EMERY on CPAP    Vitamin D deficiency    Enlarged lymph node in neck    Abnormal otoacoustic emissions test    Acquired hammertoes of both feet    Diffusion capacity of lung (dl), decreased    Elevated erythrocyte sedimentation rate    Encounter for diabetic foot exam (Multi)    Hair thinning    Leg cramping    Class 2 severe obesity due to excess calories with serious comorbidity  and body mass index (BMI) of 36.0 to 36.9 in adult    Type 2 diabetes mellitus with hyperglycemia (Multi)    Constipation    Pulmonary emphysema (Multi)    Gastroesophageal reflux disease without esophagitis    Tension type headache    Family history of colonic polyps    Cervical radiculopathy    Abdominal pain    Left lower quadrant pain     History reviewed. No pertinent surgical history.  Current Outpatient Medications on File Prior to Visit   Medication Sig Dispense Refill    albuterol (Ventolin HFA) 90 mcg/actuation inhaler Inhale 2 puffs every 4 hours if needed for wheezing or shortness of breath. 8 g 5    albuterol 90 mcg/actuation inhaler Inhale 2 puffs every 4 hours if needed for wheezing. 18 g 0    ascorbic acid (Vitamin C) 1,000 mg tablet Take 1 tablet (1,000 mg) by mouth once daily.      aspirin 81 mg EC tablet Take 1 tablet (81 mg) by mouth once daily.      blood pressure monitor kit Check BP daily 1 kit 0    cholecalciferol (Vitamin D-3) 50 MCG (2000 UT) tablet Take by mouth.      docusate sodium (Colace) 100 mg capsule Take 1 capsule (100 mg) by mouth 2 times a day. 180 capsule 3    fluticasone (Flonase) 50 mcg/actuation nasal spray Administer 2 sprays into each nostril once daily. 16 g 11    gabapentin (Neurontin) 300 mg capsule Take 1 capsule (300 mg) by mouth 3 times a day. 90 capsule 5    hydroCHLOROthiazide (HYDRODiuril) 25 mg tablet TAKE ONE TABLET BY MOUTH ONCE DAILY 90 tablet 0    lancets 33 gauge misc Use As Directed twice daily      lidocaine (Xylocaine) 5 % ointment       losartan (Cozaar) 100 mg tablet TAKE ONE TABLET BY MOUTH ONCE DAILY 90 tablet 0    metFORMIN  mg 24 hr tablet TAKE ONE TABLET BY MOUTH ONCE DAILY IN THE EVENING WITH MEALS. DO NOT CRUSH, CHEW OR SPLIT 90 tablet 0    metoprolol tartrate (Lopressor) 25 mg tablet TAKE ONE TABLET BY MOUTH TWICE DAILY 180 tablet 0    omeprazole (PriLOSEC) 40 mg DR capsule Take 1 capsule (40 mg) by mouth once daily. 90 capsule 3     OneTouch Ultra Test strip USE ONE STRIP TO TEST DAILY 100 strip 0    Ozempic 2 mg/dose (8 mg/3 mL) pen injector Inject 2 mg under the skin 1 (one) time per week.      potassium chloride CR 10 mEq ER tablet Take one a day 90 tablet 1    psyllium husk, aspartame, (Metamucil Sugar-Free, aspart,) 3.4 gram/5.8 gram powder 2 tsp daily in 8 ounces of liquid 1040 g 3    rosuvastatin (Crestor) 20 mg tablet TAKE ONE TABLET BY MOUTH ONCE DAILY 90 tablet 1    tiZANidine (Zanaflex) 2 mg tablet TAKE ONE OR TWO TABLETS BY MOUTH AT BEDTIME 60 tablet 0    vitamin E 90 mg (200 unit) capsule Take 1 tablet by mouth once daily.      vitamin-B complex split tablet Take 1 tablet (2 half tablet) by mouth once daily.       No current facility-administered medications on file prior to visit.       Exam:  LEFT Hip Exam:  No warmth, erythema or ecchymosis overlying.  Active flexion >90 degrees. ER 40deg, IR 25deg.  NTTP over greater trochanter, glute tendons, proximal ITB  [5]/5 strength of hip flexion, abduction, & adduction  SILT  [ - ]Log roll pain, [ + ]FADIR, [ - ]JOVANA, [ +]Stinchfield    General Exam:  Constitutional - NAD, AAO x 3, conversing appropriately.  HEENT- Normocephalic and atraumatic. EOMI, PERRLA, No scleral icterus. No facial deformities. Hearing grossly normal.  Lungs - Breathing non-labored with normal rate. No accessory muscle use.  CV - Extremities warm and well-perfused, brisk capillary refill present.   Neuro - CN II-XII grossly intact.    Results:  3/15/23: Xrays of L hip demonstrate moderate degenerative changes.     Lab Results   Component Value Date    HGBA1C 5.6 12/03/2024    HGBA1C 6.0 07/29/2024    CREATININE 0.74 08/27/2024    EGFR 86 08/27/2024     Procedure:  Patient ID: Carolina Martinez is a 74 y.o. female.    L Inj/Asp: L hip joint on 12/31/2024 7:55 AM  Indications: pain  Details: 22 G needle, ultrasound-guided anterior approach  Medications: 80 mg triamcinolone acetonide 40 mg/mL; 4 mL lidocaine 10 mg/mL  (1 %)  Outcome: tolerated well, no immediate complications    Procedure risk factors to include increased pain, bleeding, infection, neurovascular injury, soft tissue injury, progressive cartilage loss, transient elevation of blood glucose and blood pressure, and adverse reaction to medication were discussed with the patient. Patient understands there is a moderate risk of morbidity from undergoing the procedure.    Procedure, treatment alternatives, risks and benefits explained, specific risks discussed. Consent was given by the patient. Immediately prior to procedure a time out was called to verify the correct patient, procedure, equipment, support staff and site/side marked as required. Patient was prepped and draped in the usual sterile fashion.

## 2025-01-07 ENCOUNTER — APPOINTMENT (OUTPATIENT)
Dept: OBSTETRICS AND GYNECOLOGY | Facility: CLINIC | Age: 75
End: 2025-01-07
Payer: MEDICARE

## 2025-01-07 DIAGNOSIS — G44.86 CERVICOGENIC HEADACHE: ICD-10-CM

## 2025-01-08 RX ORDER — TIZANIDINE 2 MG/1
TABLET ORAL
Qty: 60 TABLET | Refills: 0 | Status: SHIPPED | OUTPATIENT
Start: 2025-01-08

## 2025-01-27 ENCOUNTER — TELEPHONE (OUTPATIENT)
Dept: PRIMARY CARE | Facility: CLINIC | Age: 75
End: 2025-01-27
Payer: MEDICARE

## 2025-01-27 NOTE — TELEPHONE ENCOUNTER
Dr. Capps Pt. left a voicemail  message stating that she needs to speak with you ASAP call her back at [phone] Pt. Did not leave no other information.

## 2025-01-28 ENCOUNTER — OFFICE VISIT (OUTPATIENT)
Dept: PRIMARY CARE | Facility: CLINIC | Age: 75
End: 2025-01-28
Payer: MEDICARE

## 2025-01-28 ENCOUNTER — TELEPHONE (OUTPATIENT)
Dept: PRIMARY CARE | Facility: CLINIC | Age: 75
End: 2025-01-28

## 2025-01-28 VITALS
OXYGEN SATURATION: 97 % | WEIGHT: 159 LBS | HEART RATE: 84 BPM | DIASTOLIC BLOOD PRESSURE: 67 MMHG | SYSTOLIC BLOOD PRESSURE: 129 MMHG | BODY MASS INDEX: 31.05 KG/M2

## 2025-01-28 DIAGNOSIS — F41.9 ANXIETY AND DEPRESSION: Primary | ICD-10-CM

## 2025-01-28 DIAGNOSIS — F32.A ANXIETY AND DEPRESSION: Primary | ICD-10-CM

## 2025-01-28 PROCEDURE — 4010F ACE/ARB THERAPY RXD/TAKEN: CPT | Performed by: PEDIATRICS

## 2025-01-28 PROCEDURE — G2211 COMPLEX E/M VISIT ADD ON: HCPCS | Performed by: PEDIATRICS

## 2025-01-28 PROCEDURE — 3074F SYST BP LT 130 MM HG: CPT | Performed by: PEDIATRICS

## 2025-01-28 PROCEDURE — 1157F ADVNC CARE PLAN IN RCRD: CPT | Performed by: PEDIATRICS

## 2025-01-28 PROCEDURE — 99213 OFFICE O/P EST LOW 20 MIN: CPT | Performed by: PEDIATRICS

## 2025-01-28 PROCEDURE — 3078F DIAST BP <80 MM HG: CPT | Performed by: PEDIATRICS

## 2025-01-28 RX ORDER — ESCITALOPRAM OXALATE 5 MG/1
5 TABLET ORAL DAILY
Qty: 30 TABLET | Refills: 2 | Status: SHIPPED | OUTPATIENT
Start: 2025-01-28 | End: 2025-04-28

## 2025-01-28 NOTE — TELEPHONE ENCOUNTER
Dr. Jefry Dominguez nurse practitioner [facility] left a voicemail message stating she know that Carolina Martinez have an appointment with you today and you're probably already know but she want to make  you aware you that patient Carolina Martinez reported pretty significant depression Carolina stated that she would be better off dead endorsed feelings denied any sort of intent or plan of hurting herself, if with questions call Alberto at [phone].

## 2025-01-28 NOTE — PROGRESS NOTES
"Subjective   Patient ID: Carolina Martinez is a 74 y.o. female who presents for depression.    HPI     Review of Systems  Every 2 or 3  months or so, she gets very depressed for 3 weeks or so; she prays and talks to friends who try to encourage her. Does not feel suicidal but she has felt at times since a youth that \"I wish I wasn't here\". She used to take an antidepressant about 9 years ago. She cries a lot and does not want to get out of bed.   Objective   /67   Pulse (!) 44   Wt 72.1 kg (159 lb)   LMP  (LMP Unknown)   SpO2 97%   BMI 31.05 kg/m²     Physical Exam  Initially had normal followed by premature beats for some time; then reverted to normal rhythm.  Assessment/Plan   Problem List Items Addressed This Visit             ICD-10-CM    Anxiety and depression - Primary F41.9, F32.A    Relevant Medications    escitalopram (Lexapro) 5 mg tablet          " Detail Level: Generalized

## 2025-02-04 ENCOUNTER — APPOINTMENT (OUTPATIENT)
Dept: PRIMARY CARE | Facility: CLINIC | Age: 75
End: 2025-02-04
Payer: MEDICARE

## 2025-02-04 VITALS
DIASTOLIC BLOOD PRESSURE: 88 MMHG | TEMPERATURE: 97.9 F | BODY MASS INDEX: 30.04 KG/M2 | HEART RATE: 75 BPM | HEIGHT: 60 IN | WEIGHT: 153 LBS | SYSTOLIC BLOOD PRESSURE: 156 MMHG | OXYGEN SATURATION: 99 %

## 2025-02-04 DIAGNOSIS — E78.5 HYPERLIPIDEMIA, UNSPECIFIED HYPERLIPIDEMIA TYPE: ICD-10-CM

## 2025-02-04 DIAGNOSIS — M19.90 ARTHRITIS: ICD-10-CM

## 2025-02-04 DIAGNOSIS — E11.65 TYPE 2 DIABETES MELLITUS WITH HYPERGLYCEMIA, WITHOUT LONG-TERM CURRENT USE OF INSULIN: ICD-10-CM

## 2025-02-04 DIAGNOSIS — K21.9 GASTROESOPHAGEAL REFLUX DISEASE WITHOUT ESOPHAGITIS: ICD-10-CM

## 2025-02-04 DIAGNOSIS — F32.A ANXIETY AND DEPRESSION: ICD-10-CM

## 2025-02-04 DIAGNOSIS — E11.42 DIABETIC PERIPHERAL NEUROPATHY (MULTI): ICD-10-CM

## 2025-02-04 DIAGNOSIS — M54.12 CERVICAL RADICULOPATHY: ICD-10-CM

## 2025-02-04 DIAGNOSIS — M16.12 ARTHRITIS OF LEFT HIP: ICD-10-CM

## 2025-02-04 DIAGNOSIS — K59.03 DRUG-INDUCED CONSTIPATION: ICD-10-CM

## 2025-02-04 DIAGNOSIS — K76.0 FATTY LIVER: ICD-10-CM

## 2025-02-04 DIAGNOSIS — G47.33 OSA ON CPAP: ICD-10-CM

## 2025-02-04 DIAGNOSIS — I48.91 ATRIAL FIBRILLATION, UNSPECIFIED TYPE (MULTI): ICD-10-CM

## 2025-02-04 DIAGNOSIS — H91.93 DECREASED HEARING OF BOTH EARS: ICD-10-CM

## 2025-02-04 DIAGNOSIS — I10 HYPERTENSION, UNSPECIFIED TYPE: ICD-10-CM

## 2025-02-04 DIAGNOSIS — F41.9 ANXIETY AND DEPRESSION: ICD-10-CM

## 2025-02-04 DIAGNOSIS — H91.90 HEARING LOSS, UNSPECIFIED HEARING LOSS TYPE, UNSPECIFIED LATERALITY: ICD-10-CM

## 2025-02-04 DIAGNOSIS — Z12.31 SCREENING MAMMOGRAM FOR BREAST CANCER: ICD-10-CM

## 2025-02-04 DIAGNOSIS — K58.1 IRRITABLE BOWEL SYNDROME WITH CONSTIPATION: ICD-10-CM

## 2025-02-04 DIAGNOSIS — Z00.00 MEDICARE ANNUAL WELLNESS VISIT, SUBSEQUENT: Primary | ICD-10-CM

## 2025-02-04 DIAGNOSIS — R59.0 ENLARGED LYMPH NODE IN NECK: ICD-10-CM

## 2025-02-04 DIAGNOSIS — J44.1 COPD EXACERBATION (MULTI): ICD-10-CM

## 2025-02-04 DIAGNOSIS — R91.8 MULTIPLE LUNG NODULES ON CT: ICD-10-CM

## 2025-02-04 DIAGNOSIS — E55.9 VITAMIN D DEFICIENCY: ICD-10-CM

## 2025-02-04 DIAGNOSIS — J43.9 PULMONARY EMPHYSEMA, UNSPECIFIED EMPHYSEMA TYPE (MULTI): ICD-10-CM

## 2025-02-04 DIAGNOSIS — J30.9 ALLERGIC RHINITIS, UNSPECIFIED SEASONALITY, UNSPECIFIED TRIGGER: ICD-10-CM

## 2025-02-04 PROBLEM — I65.29 STENOSIS OF CAROTID ARTERY: Status: ACTIVE | Noted: 2025-02-04

## 2025-02-04 PROBLEM — R10.32 LEFT LOWER QUADRANT PAIN: Status: RESOLVED | Noted: 2024-08-27 | Resolved: 2025-02-04

## 2025-02-04 PROBLEM — G44.86 CERVICOGENIC HEADACHE: Status: RESOLVED | Noted: 2023-06-30 | Resolved: 2025-02-04

## 2025-02-04 PROBLEM — Z83.719 FAMILY HISTORY OF COLONIC POLYPS: Status: RESOLVED | Noted: 2023-11-29 | Resolved: 2025-02-04

## 2025-02-04 PROBLEM — E66.01 CLASS 2 SEVERE OBESITY DUE TO EXCESS CALORIES WITH SERIOUS COMORBIDITY AND BODY MASS INDEX (BMI) OF 36.0 TO 36.9 IN ADULT: Status: RESOLVED | Noted: 2017-05-25 | Resolved: 2025-02-04

## 2025-02-04 PROBLEM — R19.8 ABNORMAL FINDINGS ON ESOPHAGOGASTRODUODENOSCOPY (EGD): Status: RESOLVED | Noted: 2023-06-30 | Resolved: 2025-02-04

## 2025-02-04 PROBLEM — R25.2 LEG CRAMPING: Status: RESOLVED | Noted: 2023-08-21 | Resolved: 2025-02-04

## 2025-02-04 PROBLEM — E66.812 CLASS 2 SEVERE OBESITY DUE TO EXCESS CALORIES WITH SERIOUS COMORBIDITY AND BODY MASS INDEX (BMI) OF 36.0 TO 36.9 IN ADULT: Status: RESOLVED | Noted: 2017-05-25 | Resolved: 2025-02-04

## 2025-02-04 PROBLEM — R10.9 ABDOMINAL PAIN: Status: RESOLVED | Noted: 2024-08-27 | Resolved: 2025-02-04

## 2025-02-04 PROCEDURE — 3079F DIAST BP 80-89 MM HG: CPT | Performed by: PEDIATRICS

## 2025-02-04 PROCEDURE — 99214 OFFICE O/P EST MOD 30 MIN: CPT | Performed by: PEDIATRICS

## 2025-02-04 PROCEDURE — 1123F ACP DISCUSS/DSCN MKR DOCD: CPT | Performed by: PEDIATRICS

## 2025-02-04 PROCEDURE — 93000 ELECTROCARDIOGRAM COMPLETE: CPT | Performed by: PEDIATRICS

## 2025-02-04 PROCEDURE — 1157F ADVNC CARE PLAN IN RCRD: CPT | Performed by: PEDIATRICS

## 2025-02-04 PROCEDURE — 1158F ADVNC CARE PLAN TLK DOCD: CPT | Performed by: PEDIATRICS

## 2025-02-04 PROCEDURE — 3008F BODY MASS INDEX DOCD: CPT | Performed by: PEDIATRICS

## 2025-02-04 PROCEDURE — 1170F FXNL STATUS ASSESSED: CPT | Performed by: PEDIATRICS

## 2025-02-04 PROCEDURE — G0439 PPPS, SUBSEQ VISIT: HCPCS | Performed by: PEDIATRICS

## 2025-02-04 PROCEDURE — 4010F ACE/ARB THERAPY RXD/TAKEN: CPT | Performed by: PEDIATRICS

## 2025-02-04 PROCEDURE — 3077F SYST BP >= 140 MM HG: CPT | Performed by: PEDIATRICS

## 2025-02-04 RX ORDER — ALBUTEROL SULFATE 90 UG/1
2 INHALANT RESPIRATORY (INHALATION) EVERY 4 HOURS PRN
Qty: 18 G | Refills: 0 | COMMUNITY
Start: 2025-02-04 | End: 2025-02-04 | Stop reason: ALTCHOICE

## 2025-02-04 RX ORDER — FAMOTIDINE 40 MG/1
40 TABLET, FILM COATED ORAL 2 TIMES DAILY
Qty: 60 TABLET | Refills: 5 | Status: SHIPPED | OUTPATIENT
Start: 2025-02-04 | End: 2025-08-03

## 2025-02-04 ASSESSMENT — ACTIVITIES OF DAILY LIVING (ADL)
TAKING_MEDICATION: INDEPENDENT
DOING_HOUSEWORK: INDEPENDENT
BATHING: INDEPENDENT
GROCERY_SHOPPING: INDEPENDENT
DRESSING: INDEPENDENT
MANAGING_FINANCES: INDEPENDENT

## 2025-02-04 ASSESSMENT — PATIENT HEALTH QUESTIONNAIRE - PHQ9
2. FEELING DOWN, DEPRESSED OR HOPELESS: SEVERAL DAYS
SUM OF ALL RESPONSES TO PHQ9 QUESTIONS 1 AND 2: 2
1. LITTLE INTEREST OR PLEASURE IN DOING THINGS: SEVERAL DAYS

## 2025-02-04 NOTE — ASSESSMENT & PLAN NOTE
BP was good last visit; continue losartan, hydrochlorothiazide and metoprolol     will check home blood pressures;

## 2025-02-04 NOTE — PROGRESS NOTES
Subjective   Reason for Visit: Carolina Martinez is an 74 y.o. female here for a Medicare Wellness visit.               HPI  Colonoscopy due 2031  Dexa due next year  Mammogram ordered  Pain in neck, knees, shoulders, hips and back  Depression getting better on lexapro and started to see counselor; was told she has PTSD  Does see Dr Quiñonez for her eyes  Patient Care Team:  Ashtyn Capps MD as PCP - General (Pediatrics)     Review of Systems    Objective   Vitals:  /88   Pulse 75   Temp 36.6 °C (97.9 °F)   Ht 1.524 m (5')   Wt 69.4 kg (153 lb)   LMP  (LMP Unknown)   SpO2 99%   BMI 29.88 kg/m²       Physical Exam  Vitals reviewed.   Constitutional:       General: She is not in acute distress.  HENT:      Head: Normocephalic.      Right Ear: Tympanic membrane normal.      Left Ear: Tympanic membrane normal.      Nose: Nose normal.      Mouth/Throat:      Pharynx: Oropharynx is clear.   Cardiovascular:      Rate and Rhythm: Normal rate and regular rhythm.      Heart sounds: Normal heart sounds.   Pulmonary:      Breath sounds: Normal breath sounds.   Abdominal:      Palpations: Abdomen is soft.      Tenderness: There is no abdominal tenderness.   Musculoskeletal:         General: No tenderness.   Skin:     Findings: No rash.   Neurological:      General: No focal deficit present.      Mental Status: She is alert.   Psychiatric:         Mood and Affect: Mood normal.       Pedal pulses and sensation intact  Assessment & Plan  Hyperlipidemia, unspecified hyperlipidemia type       cholesterol good in April; continue Rosuvastatin  Hypertension, unspecified type  BP was good last visit; continue losartan, hydrochlorothiazide and metoprolol     will check home blood pressures;  Allergic rhinitis, unspecified seasonality, unspecified trigger  Takes flonase       Hearing loss, unspecified hearing loss type, unspecified laterality  Will get audiometry       Type 2 diabetes mellitus with hyperglycemia, without long-term  current use of insulin    Highest sugar goes is 112; A1C ordered by endo and will be done in April; Last A1C 5.6   Vitamin D deficiency       last level 44; takes supplements  Irritable bowel syndrome with constipation         COPD exacerbation (Multi)         Gastroesophageal reflux disease without esophagitis    Orders:    famotidine (Pepcid) 40 mg tablet; Take 1 tablet (40 mg) by mouth 2 times a day.    Drug-induced constipation  controlled         Anxiety and depression  Depression better         Arthritis of left hip  Will get shot from Dr Lester         Cervical radiculopathy  Occasionally right arm pain; no neck pain in a while         Diabetic peripheral neuropathy (Multi)  Has tingling in feet         Multiple lung nodules on CT  Benign nodules per last CT         Pulmonary emphysema, unspecified emphysema type (Multi)  Follows up with Isabel Montoya         EMERY on CPAP  Uses CPAP with oxygen         Enlarged lymph node in neck    Orders:    US head neck soft tissue; Future    Arthritis    Orders:    LESLEE; Future    Rheumatoid factor; Future    Sedimentation Rate; Future    ECG 12 lead (Clinic Performed)    Atrial fibrillation, unspecified type (Multi)         Screening mammogram for breast cancer    Orders:    BI mammo bilateral screening tomosynthesis; Future    Decreased hearing of both ears    Orders:    Audiometry with tympanometry; Future    Fatty liver  Fibroscan due 2027         Medicare annual wellness visit, subsequent

## 2025-02-04 NOTE — ASSESSMENT & PLAN NOTE
Allergies have been bothersome. Patient complains of pressure. Needs new Flonase prescription.

## 2025-02-04 NOTE — ASSESSMENT & PLAN NOTE
Orders:    LESLEE; Future    Rheumatoid factor; Future    Sedimentation Rate; Future    ECG 12 lead (Clinic Performed)

## 2025-02-04 NOTE — PROGRESS NOTES
Subjective           HPI  Reason for Visit: Carolina Martinez is an 74 y.o. female here for a Medicare Wellness visit.     Patient Care Team:  Ashtyn Capps MD as PCP - General (Pediatrics)     Review of Systems    Objective   Vitals:  /88   Pulse 75   Temp 36.6 °C (97.9 °F)   Ht 1.524 m (5')   Wt 69.4 kg (153 lb)   LMP  (LMP Unknown)   SpO2 99%   BMI 29.88 kg/m²       Physical Exam    Assessment & Plan  Hyperlipidemia, unspecified hyperlipidemia type  Lab work from 2024 showed good cholesterol. Next labs due 04/2025         Hypertension, unspecified type  Current Medication Regimen:  Hydrochlorothiazide 25 mg  Losartan 100mg  Metoprolol 25 mg BID         Allergic rhinitis, unspecified seasonality, unspecified trigger  Allergies have been bothersome. Patient complains of pressure. Needs new Flonase prescription.          Hearing loss, unspecified hearing loss type, unspecified laterality  Unchanged. Patient does not follow ENT         Type 2 diabetes mellitus with hyperglycemia, without long-term current use of insulin  Diabetes is well managed. A1C due in March 2025  Current Medications:  Ozempic  Metformin         Vitamin D deficiency  Currently taking VD Supplements 2000 units         Irritable bowel syndrome with constipation  Patient reports mild constipation, usually taken care of with laxatives

## 2025-02-04 NOTE — ASSESSMENT & PLAN NOTE
Current Medication Regimen:  Hydrochlorothiazide 25 mg  Losartan 100mg  Metoprolol 25 mg BID

## 2025-02-05 PROBLEM — G44.209 TENSION TYPE HEADACHE: Status: RESOLVED | Noted: 2023-08-21 | Resolved: 2025-02-05

## 2025-02-05 PROBLEM — I65.29 STENOSIS OF CAROTID ARTERY: Status: RESOLVED | Noted: 2025-02-04 | Resolved: 2025-02-05

## 2025-02-05 PROBLEM — G57.93 NEUROPATHIC PAIN OF BOTH FEET: Status: RESOLVED | Noted: 2023-06-30 | Resolved: 2025-02-05

## 2025-02-05 PROBLEM — K58.1 IRRITABLE BOWEL SYNDROME WITH CONSTIPATION: Status: RESOLVED | Noted: 2023-06-30 | Resolved: 2025-02-05

## 2025-02-05 PROBLEM — I48.91 ATRIAL FIBRILLATION, UNSPECIFIED TYPE (MULTI): Status: RESOLVED | Noted: 2025-02-04 | Resolved: 2025-02-05

## 2025-02-06 ENCOUNTER — HOSPITAL ENCOUNTER (OUTPATIENT)
Dept: RADIOLOGY | Facility: CLINIC | Age: 75
Discharge: HOME | End: 2025-02-06
Payer: MEDICARE

## 2025-02-06 DIAGNOSIS — R59.0 ENLARGED LYMPH NODE IN NECK: ICD-10-CM

## 2025-02-06 PROCEDURE — 76536 US EXAM OF HEAD AND NECK: CPT

## 2025-02-07 LAB
ANA SER QL IF: NEGATIVE
ERYTHROCYTE [SEDIMENTATION RATE] IN BLOOD BY WESTERGREN METHOD: 6 MM/H
RHEUMATOID FACT SERPL-ACNC: <10 IU/ML

## 2025-02-10 ENCOUNTER — TELEPHONE (OUTPATIENT)
Dept: PRIMARY CARE | Facility: CLINIC | Age: 75
End: 2025-02-10
Payer: MEDICARE

## 2025-02-10 NOTE — TELEPHONE ENCOUNTER
Result Communication    No results found from the In Basket message.    3:22 PM      Results were successfully communicated with the patient and they acknowledged their understanding.

## 2025-02-15 DIAGNOSIS — I10 HYPERTENSION, UNSPECIFIED TYPE: ICD-10-CM

## 2025-02-17 ENCOUNTER — TELEPHONE (OUTPATIENT)
Dept: ENDOCRINOLOGY | Facility: CLINIC | Age: 75
End: 2025-02-17
Payer: MEDICARE

## 2025-02-17 DIAGNOSIS — E11.9 TYPE 2 DIABETES MELLITUS WITHOUT COMPLICATION, WITHOUT LONG-TERM CURRENT USE OF INSULIN (MULTI): ICD-10-CM

## 2025-02-17 DIAGNOSIS — I10 HYPERTENSION, UNSPECIFIED TYPE: ICD-10-CM

## 2025-02-17 NOTE — PROGRESS NOTES
"AUDIOLOGY ADULT AUDIOMETRIC EVALUATION      Name:  Carolina Martinez   :  1950  Age:  74 y.o.  Date of Evaluation:  2025    Time: 2236-2271    IMPRESSIONS     Normal to mild sensorineural hearing loss in both ears.  Word recognition in quiet is excellent in both ears.  Tympanometry indicates normal middle ear pressure and tympanic membrane mobility in both ears.       RECOMMENDATIONS     Continue medical follow up with primary care provider and/or Ears Nose and Throat (ENT) provider as recommended.  Return for audiologic evaluation annually to monitor hearing sensitivity and assess middle ear status or sooner should concerns arise. The audiology department can be reached at [phone] to schedule an appointment.   Avoid exposure to loud sounds by moving away from the noise, turning down the volume, or wearing proper hearing protection correctly.    HISTORY     Reason for visit:  Ms. Martinez is seen today for a follow up audiologic evaluation at the request of Ashtyn Capps MD due to perceived progression in hearing levels. Previous audio was performed on 22 and revealed hearing sensitivity within normal limits bilaterally. History obtained from patient report and chart review.     Change in Hearing: yes, some difficulty in conversations, asking for repetition  Tinnitus: yes, occasionally  Otalgia: denied  Aural Pressure/Fullness: denied  Recent Ear Infections/Illness: denied  Otorrhea: denied  History of Ear Surgeries: denied    EVALUATION     See scanned audiogram in \"Media\"     TEST RESULTS     Otoscopic Evaluation:  Right Ear: Ear canal clear, tympanic membrane visualized.  Left Ear: Ear canal clear, tympanic membrane visualized.    Tympanometry (226 Hz):  Right Ear: Type A, middle ear pressure and tympanic membrane compliance within normal limits.   Left Ear: Type A, middle ear pressure and tympanic membrane compliance within normal limits.     Acoustic Reflexes:   Right Ear: (Ipsilateral) " Responses present at 500-2000 Hz, and absent at 4000 Hz.  Left Ear: (Ipsilateral) Responses present at 500-4000 Hz.    Distortion Product Otoacoustic Emissions:  Right Ear: Did not test.  Left Ear:  Did not test.  Present OAEs suggest normal or near cochlear outer hair cell function for corresponding frequency region(s). Absent OAEs with normal middle ear function can be consistent with some degree of hearing loss. Assessment of cochlear outer hair cell function may be impacted by outer or middle ear function.    Test technique:  Pure Tone Audiometry via insert earphones  Reliability: Good    Pure Tone Audiometry:    Right Ear: Hearing in the normal to mild hearing loss range 125-8000 Hz.  Left Ear: Hearing in the normal to mild hearing loss range 125-8000 Hz.    Speech Audiometry:   Right Ear:  Speech Reception Threshold (SRT) was obtained at 15 dB HL. Word Recognition scores were excellent (100%) in quiet when words were presented at 55 dB HL. These results are based on [facility] Auditory Test No.6 (NU-6) (N=25).   Left Ear:  Speech Reception Threshold (SRT) was obtained at 10 dB HL. Word Recognition scores were excellent (96%) in quiet when words were presented at 50 dB HL. These results are based on [facility] Auditory Test No.6 (NU-6) (N=25).     Comparison of today's results with previous test results:  Progressive at 8000 Hz only since last evaluation on 4/20/22.         PATIENT EDUCATION     Discussed results and recommendations with Ms. Martinez. Questions were addressed and the patient was encouraged to contact our department at [phone] should concerns arise.       Tracey Obrien, CCC-A  Clinical Audiologist    Degree of   Hearing Sensitivity dB Range   Within Normal Limits (WNL) 0 - 20   Slight 25   Mild 26 - 40   Moderate 41 - 55   Moderately-Severe 56 - 70   Severe 71 - 90   Profound 91 +     Key   CHL Conductive Hearing Loss   ECV Ear Canal Volume   HA Hearing Aid    NIHL Noise-Induced Hearing Loss   PTA Pure Tone Average   SNHL Sensorineural Hearing Loss   TM Tympanic Membrane   WNL Within Normal Limits

## 2025-02-17 NOTE — TELEPHONE ENCOUNTER
Returned patient's call.  She had the flu and was not feeling well for the past 1.5 weeks.  Was not eating or drinking much at all.  Feeling better today. No abdominal pain. Feels well enough to cook.    She has been on Ozempic for about 2 years. It seems less likely that she would develop new abdominal issues from Ozempic at this time.  She has chronic constipation (prior to Ozempic)  and is prescribed metamucil from her PCP. Because she had not been eating or drinking well over the past 1.5 weeks, she may be dehydrated which may have worsened her constipation.  I recommended she start hydrating with water and on-sugary drinks and take metamucil.  Advised that she go to the ED for acutely worsening or intolerable abdominal pain.  She is OK to skip Ozempic today and resume next week if she is feeling better.  The patient verbalized understanding and was very appreciative of the call.

## 2025-02-17 NOTE — TELEPHONE ENCOUNTER
"Patient called complaining of stomach pain for approximately 3 weeks. Patient reports a \"gnawing\" pain that has gotten up to a 9 out of 10. Reports chronic constipation and has not a bm since last Thursday. Patient believes this may be a side effect from her ozempic.    Kelly George RN   "

## 2025-02-18 ENCOUNTER — CLINICAL SUPPORT (OUTPATIENT)
Dept: AUDIOLOGY | Facility: CLINIC | Age: 75
End: 2025-02-18
Payer: MEDICARE

## 2025-02-18 DIAGNOSIS — H93.13 TINNITUS OF BOTH EARS: ICD-10-CM

## 2025-02-18 DIAGNOSIS — H90.3 SENSORINEURAL HEARING LOSS (SNHL) OF BOTH EARS: Primary | ICD-10-CM

## 2025-02-18 PROCEDURE — 92550 TYMPANOMETRY & REFLEX THRESH: CPT | Mod: 52 | Performed by: AUDIOLOGIST

## 2025-02-18 PROCEDURE — 92557 COMPREHENSIVE HEARING TEST: CPT | Performed by: AUDIOLOGIST

## 2025-02-18 RX ORDER — HYDROCHLOROTHIAZIDE 25 MG/1
25 TABLET ORAL DAILY
Qty: 90 TABLET | Refills: 0 | Status: SHIPPED | OUTPATIENT
Start: 2025-02-18

## 2025-02-18 RX ORDER — LOSARTAN POTASSIUM 100 MG/1
100 TABLET ORAL DAILY
Qty: 90 TABLET | Refills: 0 | Status: SHIPPED | OUTPATIENT
Start: 2025-02-18

## 2025-02-18 RX ORDER — BLOOD SUGAR DIAGNOSTIC
STRIP MISCELLANEOUS
Qty: 100 STRIP | Refills: 0 | Status: SHIPPED | OUTPATIENT
Start: 2025-02-18

## 2025-02-18 RX ORDER — POTASSIUM CHLORIDE 750 MG/1
TABLET, FILM COATED, EXTENDED RELEASE ORAL
Qty: 90 TABLET | Refills: 0 | Status: SHIPPED | OUTPATIENT
Start: 2025-02-18

## 2025-02-18 RX ORDER — METOPROLOL TARTRATE 25 MG/1
25 TABLET, FILM COATED ORAL 2 TIMES DAILY
Qty: 180 TABLET | Refills: 0 | Status: SHIPPED | OUTPATIENT
Start: 2025-02-18

## 2025-02-18 RX ORDER — ROSUVASTATIN CALCIUM 20 MG/1
20 TABLET, COATED ORAL DAILY
Qty: 90 TABLET | Refills: 0 | Status: SHIPPED | OUTPATIENT
Start: 2025-02-18

## 2025-02-21 ENCOUNTER — TELEPHONE (OUTPATIENT)
Dept: OBSTETRICS AND GYNECOLOGY | Facility: CLINIC | Age: 75
End: 2025-02-21
Payer: MEDICARE

## 2025-02-22 ENCOUNTER — HOSPITAL ENCOUNTER (EMERGENCY)
Facility: HOSPITAL | Age: 75
Discharge: HOME | End: 2025-02-22
Attending: EMERGENCY MEDICINE
Payer: MEDICARE

## 2025-02-22 ENCOUNTER — APPOINTMENT (OUTPATIENT)
Dept: CARDIOLOGY | Facility: HOSPITAL | Age: 75
End: 2025-02-22
Payer: MEDICARE

## 2025-02-22 ENCOUNTER — APPOINTMENT (OUTPATIENT)
Dept: RADIOLOGY | Facility: HOSPITAL | Age: 75
End: 2025-02-22
Payer: MEDICARE

## 2025-02-22 VITALS
OXYGEN SATURATION: 100 % | DIASTOLIC BLOOD PRESSURE: 76 MMHG | TEMPERATURE: 97.4 F | SYSTOLIC BLOOD PRESSURE: 137 MMHG | BODY MASS INDEX: 28.47 KG/M2 | HEIGHT: 60 IN | WEIGHT: 145 LBS | HEART RATE: 72 BPM | RESPIRATION RATE: 17 BRPM

## 2025-02-22 DIAGNOSIS — K29.70 GASTRITIS WITHOUT BLEEDING, UNSPECIFIED CHRONICITY, UNSPECIFIED GASTRITIS TYPE: Primary | ICD-10-CM

## 2025-02-22 DIAGNOSIS — R10.13 EPIGASTRIC PAIN: ICD-10-CM

## 2025-02-22 LAB
ALBUMIN SERPL BCP-MCNC: 4.3 G/DL (ref 3.4–5)
ALP SERPL-CCNC: 46 U/L (ref 33–136)
ALT SERPL W P-5'-P-CCNC: 16 U/L (ref 7–45)
ANION GAP SERPL CALC-SCNC: 12 MMOL/L (ref 10–20)
APPEARANCE UR: CLEAR
AST SERPL W P-5'-P-CCNC: 31 U/L (ref 9–39)
BASOPHILS # BLD AUTO: 0.02 X10*3/UL (ref 0–0.1)
BASOPHILS NFR BLD AUTO: 0.4 %
BILIRUB SERPL-MCNC: 0.5 MG/DL (ref 0–1.2)
BILIRUB UR STRIP.AUTO-MCNC: NEGATIVE MG/DL
BUN SERPL-MCNC: 16 MG/DL (ref 6–23)
CALCIUM SERPL-MCNC: 9.9 MG/DL (ref 8.6–10.3)
CHLORIDE SERPL-SCNC: 98 MMOL/L (ref 98–107)
CO2 SERPL-SCNC: 29 MMOL/L (ref 21–32)
COLOR UR: ABNORMAL
CREAT SERPL-MCNC: 0.7 MG/DL (ref 0.5–1.05)
EGFRCR SERPLBLD CKD-EPI 2021: >90 ML/MIN/1.73M*2
EOSINOPHIL # BLD AUTO: 0.03 X10*3/UL (ref 0–0.4)
EOSINOPHIL NFR BLD AUTO: 0.6 %
ERYTHROCYTE [DISTWIDTH] IN BLOOD BY AUTOMATED COUNT: 14.7 % (ref 11.5–14.5)
GLUCOSE SERPL-MCNC: 97 MG/DL (ref 74–99)
GLUCOSE UR STRIP.AUTO-MCNC: NORMAL MG/DL
HCT VFR BLD AUTO: 40.3 % (ref 36–46)
HGB BLD-MCNC: 13.6 G/DL (ref 12–16)
IMM GRANULOCYTES # BLD AUTO: 0.01 X10*3/UL (ref 0–0.5)
IMM GRANULOCYTES NFR BLD AUTO: 0.2 % (ref 0–0.9)
KETONES UR STRIP.AUTO-MCNC: ABNORMAL MG/DL
LACTATE SERPL-SCNC: 0.8 MMOL/L (ref 0.4–2)
LEUKOCYTE ESTERASE UR QL STRIP.AUTO: NEGATIVE
LIPASE SERPL-CCNC: 104 U/L (ref 9–82)
LYMPHOCYTES # BLD AUTO: 1.96 X10*3/UL (ref 0.8–3)
LYMPHOCYTES NFR BLD AUTO: 36.2 %
MCH RBC QN AUTO: 28.3 PG (ref 26–34)
MCHC RBC AUTO-ENTMCNC: 33.7 G/DL (ref 32–36)
MCV RBC AUTO: 84 FL (ref 80–100)
MONOCYTES # BLD AUTO: 0.44 X10*3/UL (ref 0.05–0.8)
MONOCYTES NFR BLD AUTO: 8.1 %
NEUTROPHILS # BLD AUTO: 2.95 X10*3/UL (ref 1.6–5.5)
NEUTROPHILS NFR BLD AUTO: 54.5 %
NITRITE UR QL STRIP.AUTO: NEGATIVE
NRBC BLD-RTO: 0 /100 WBCS (ref 0–0)
PH UR STRIP.AUTO: 5.5 [PH]
PLATELET # BLD AUTO: 263 X10*3/UL (ref 150–450)
POTASSIUM SERPL-SCNC: 4.2 MMOL/L (ref 3.5–5.3)
PROT SERPL-MCNC: 6.8 G/DL (ref 6.4–8.2)
PROT UR STRIP.AUTO-MCNC: NEGATIVE MG/DL
RBC # BLD AUTO: 4.8 X10*6/UL (ref 4–5.2)
RBC # UR STRIP.AUTO: NEGATIVE MG/DL
SODIUM SERPL-SCNC: 135 MMOL/L (ref 136–145)
SP GR UR STRIP.AUTO: 1.01
UROBILINOGEN UR STRIP.AUTO-MCNC: NORMAL MG/DL
WBC # BLD AUTO: 5.4 X10*3/UL (ref 4.4–11.3)

## 2025-02-22 PROCEDURE — 81003 URINALYSIS AUTO W/O SCOPE: CPT | Performed by: EMERGENCY MEDICINE

## 2025-02-22 PROCEDURE — 2550000001 HC RX 255 CONTRASTS: Performed by: EMERGENCY MEDICINE

## 2025-02-22 PROCEDURE — 36415 COLL VENOUS BLD VENIPUNCTURE: CPT | Performed by: EMERGENCY MEDICINE

## 2025-02-22 PROCEDURE — 93005 ELECTROCARDIOGRAM TRACING: CPT

## 2025-02-22 PROCEDURE — 99285 EMERGENCY DEPT VISIT HI MDM: CPT | Mod: 25 | Performed by: EMERGENCY MEDICINE

## 2025-02-22 PROCEDURE — 83605 ASSAY OF LACTIC ACID: CPT | Performed by: EMERGENCY MEDICINE

## 2025-02-22 PROCEDURE — 83690 ASSAY OF LIPASE: CPT | Performed by: EMERGENCY MEDICINE

## 2025-02-22 PROCEDURE — 74177 CT ABD & PELVIS W/CONTRAST: CPT

## 2025-02-22 PROCEDURE — 80053 COMPREHEN METABOLIC PANEL: CPT | Performed by: EMERGENCY MEDICINE

## 2025-02-22 PROCEDURE — 85025 COMPLETE CBC W/AUTO DIFF WBC: CPT | Performed by: EMERGENCY MEDICINE

## 2025-02-22 PROCEDURE — 74177 CT ABD & PELVIS W/CONTRAST: CPT | Performed by: RADIOLOGY

## 2025-02-22 RX ORDER — PANTOPRAZOLE SODIUM 40 MG/1
40 TABLET, DELAYED RELEASE ORAL
Qty: 30 TABLET | Refills: 0 | Status: SHIPPED | OUTPATIENT
Start: 2025-02-22 | End: 2026-02-22

## 2025-02-22 RX ADMIN — IOHEXOL 75 ML: 350 INJECTION, SOLUTION INTRAVENOUS at 13:01

## 2025-02-22 ASSESSMENT — PAIN DESCRIPTION - ORIENTATION: ORIENTATION: LOWER

## 2025-02-22 ASSESSMENT — COLUMBIA-SUICIDE SEVERITY RATING SCALE - C-SSRS
6. HAVE YOU EVER DONE ANYTHING, STARTED TO DO ANYTHING, OR PREPARED TO DO ANYTHING TO END YOUR LIFE?: NO
2. HAVE YOU ACTUALLY HAD ANY THOUGHTS OF KILLING YOURSELF?: NO
1. IN THE PAST MONTH, HAVE YOU WISHED YOU WERE DEAD OR WISHED YOU COULD GO TO SLEEP AND NOT WAKE UP?: NO

## 2025-02-22 ASSESSMENT — PAIN - FUNCTIONAL ASSESSMENT: PAIN_FUNCTIONAL_ASSESSMENT: 0-10

## 2025-02-22 ASSESSMENT — PAIN SCALES - GENERAL: PAINLEVEL_OUTOF10: 10 - WORST POSSIBLE PAIN

## 2025-02-22 ASSESSMENT — PAIN DESCRIPTION - LOCATION: LOCATION: ABDOMEN

## 2025-02-22 NOTE — ED TRIAGE NOTES
PT is A/Ox4 coming in for lower ABD pain. Pt stated that this has been ongoing for 3 weeks. PT stated that she has not been able to have regular bowel movents. PT has nausea but no vomiting.

## 2025-02-22 NOTE — ED PROVIDER NOTES
HPI   Chief Complaint   Patient presents with    Abdominal Pain       HPI  Patient is a 74-year-old female with past medical history significant for type 2 diabetes, EMERY with CPAP, anxiety, hypertension, fatty liver, GERD, emphysema who presented to the emergency room with a chief complaint of abdominal discomfort.  Patient states that he has been ongoing for about 3 weeks.  It feels like a gnawing and burning above her bellybutton but sometimes it radiates to her left lower quadrant.  Sometimes food makes it worse but sometimes it makes it better.  Sometimes she feels worse when she has not eaten.  She has been experiencing decreased bowel movements and her endocrinologist said to take Metamucil for this.  She is on Ozempic.  Denies any other symptoms at this time including chest pain, fever, chills, dysuria or hematuria.      PMHx: As above  PSHx: Left side oophorectomy  FamilyHx: Denies  SocialHx: Denies  Allergies: Per EMR  Medications: See Medication Reconciliation currently on Ozempic    ROS  As above otherwise denies      Physical Exam    GENERAL: Awake and Alert, No Acute Distress  HEENT: AT/NC, PERRL, EOMI, Normal Oropharynx, No Signs of Dehydration  NECK: Normal Inspection, No JVD  CARDIOVASCULAR: RRR, No M/R/G  RESPIRATORY: CTA Bilaterally, No Wheezes, Rales or Rhonchi, Chest Wall Non-tender  ABDOMEN: Mildly diffusely uncomfortable but otherwise soft, non-tender abdomen, Normal Bowel Sounds, No Distention  BACK: No CVA Tenderness  SKIN: Normal Color, Warm, Dry, No Rashes   EXTREMITIES: Non-Tender, Full ROM, No Pedal Edema  NEURO: A&O x 3, Normal Motor and Sensation, Normal Mood and Affect    Nursing Assessment and Vitals Reviewed    EKG showed a sinus rhythm at 70 bpm.  There is a first-degree AV block with left axis deviation.  No ischemic ST changes.    Medical Decision  Patient is a 74-year-old female with past medical history significant for type 2 diabetes, EMERY with CPAP, anxiety, hypertension, fatty  liver, GERD, emphysema who presented to the emergency room with a chief complaint of abdominal discomfort.  Patient states that he has been ongoing for about 3 weeks.  It feels like a gnawing and burning above her bellybutton but sometimes it radiates to her left lower quadrant.  Sometimes food makes it worse but sometimes it makes it better.  Sometimes she feels worse when she has not eaten.  She has been experiencing decreased bowel movements and her endocrinologist said to take Metamucil for this.  She is on Ozempic.  Denies any other symptoms at this time including chest pain, fever, chills, dysuria or hematuria.    Evaluation she is well-appearing and in no acute distress.  Lungs are clear and heart is regular.  Abdomen is mildly diffusely tender but otherwise soft and not significantly tender on palpation.  Given her longevity of symptoms, history and risk factors a workup was performed.    Workup for patient included labs that did not reveal any emergent electrolyte imbalance.  Lactate is within normal limits.  Lipase lightly elevated.  She has no leukocytosis or anemia.  Urinalysis did not reveal signs of infection.  CT of the abdomen and pelvis showed no acute abnormality.  On reevaluation patient is well-appearing and in no acute distress.  Will start her on a PPI and refer her to GI as well as her primary care physician.  She is thoroughly educated on signs and symptoms that should prompt immediate turn to emergency room.              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    Carotidynia    Other specified soft tissue disorders 09/15/2021    Calf swelling    Personal history of diseases of the blood and blood-forming organs and certain disorders involving the immune mechanism 11/05/2021    History of anemia    Personal history of other diseases of the circulatory system 09/30/2021    History of intermittent claudication    Personal history of other  diseases of the respiratory system 2022    History of chronic obstructive lung disease    Personal history of other specified conditions 2022    History of dizziness    Shortness of breath 2021    SOB (shortness of breath) on exertion     No past surgical history on file.  Family History   Problem Relation Name Age of Onset    Coronary artery disease Sister          Stent patent    Lung cancer Sister      Coronary artery disease Brother      Heart attack Brother      Suicide Attempts Daughter      Breast cancer Father's Sister  85     Social History     Tobacco Use    Smoking status: Former     Current packs/day: 0.00     Average packs/day: 1 pack/day for 33.0 years (33.0 ttl pk-yrs)     Types: Cigarettes     Start date:      Quit date:      Years since quittin.1    Smokeless tobacco: Never   Substance Use Topics    Alcohol use: Yes     Alcohol/week: 1.0 standard drink of alcohol     Types: 1 Glasses of wine per week     Comment: social    Drug use: Never       Physical Exam   ED Triage Vitals [25 0747]   Temperature Heart Rate Respirations BP   (!) 30.8 °C (87.4 °F) 78 16 142/76      Pulse Ox Temp src Heart Rate Source Patient Position   98 % -- -- --      BP Location FiO2 (%)     -- --       Physical Exam      ED Course & MDM   Diagnoses as of 25 1357   Gastritis without bleeding, unspecified chronicity, unspecified gastritis type   Epigastric pain                 No data recorded     Mic Coma Scale Score: 15 (25 1003 : Salena Coffey, PATI)                           Medical Decision Making      Procedure  Procedures     Selena Edmond MD  [id number]

## 2025-02-22 NOTE — DISCHARGE INSTRUCTIONS
Please follow-up with your primary care physician.  Return immediately if concerning symptoms, as discussed.  Medications as indicated for length of time instructed.

## 2025-02-24 ENCOUNTER — APPOINTMENT (OUTPATIENT)
Dept: OBSTETRICS AND GYNECOLOGY | Facility: CLINIC | Age: 75
End: 2025-02-24
Payer: MEDICARE

## 2025-02-24 VITALS
DIASTOLIC BLOOD PRESSURE: 67 MMHG | HEIGHT: 60 IN | SYSTOLIC BLOOD PRESSURE: 106 MMHG | WEIGHT: 151 LBS | BODY MASS INDEX: 29.64 KG/M2

## 2025-02-24 DIAGNOSIS — F41.9 ANXIETY AND DEPRESSION: Primary | ICD-10-CM

## 2025-02-24 DIAGNOSIS — F32.A ANXIETY AND DEPRESSION: Primary | ICD-10-CM

## 2025-02-24 PROCEDURE — 1159F MED LIST DOCD IN RCRD: CPT | Performed by: OBSTETRICS & GYNECOLOGY

## 2025-02-24 PROCEDURE — 1160F RVW MEDS BY RX/DR IN RCRD: CPT | Performed by: OBSTETRICS & GYNECOLOGY

## 2025-02-24 PROCEDURE — 1157F ADVNC CARE PLAN IN RCRD: CPT | Performed by: OBSTETRICS & GYNECOLOGY

## 2025-02-24 PROCEDURE — 99203 OFFICE O/P NEW LOW 30 MIN: CPT | Performed by: OBSTETRICS & GYNECOLOGY

## 2025-02-24 PROCEDURE — 3078F DIAST BP <80 MM HG: CPT | Performed by: OBSTETRICS & GYNECOLOGY

## 2025-02-24 PROCEDURE — 3074F SYST BP LT 130 MM HG: CPT | Performed by: OBSTETRICS & GYNECOLOGY

## 2025-02-24 PROCEDURE — 1036F TOBACCO NON-USER: CPT | Performed by: OBSTETRICS & GYNECOLOGY

## 2025-02-24 PROCEDURE — 4010F ACE/ARB THERAPY RXD/TAKEN: CPT | Performed by: OBSTETRICS & GYNECOLOGY

## 2025-02-24 PROCEDURE — 3008F BODY MASS INDEX DOCD: CPT | Performed by: OBSTETRICS & GYNECOLOGY

## 2025-02-24 SDOH — ECONOMIC STABILITY: INCOME INSECURITY: IN THE LAST 12 MONTHS, WAS THERE A TIME WHEN YOU WERE NOT ABLE TO PAY THE MORTGAGE OR RENT ON TIME?: NO

## 2025-02-24 SDOH — ECONOMIC STABILITY: TRANSPORTATION INSECURITY
IN THE PAST 12 MONTHS, HAS LACK OF TRANSPORTATION KEPT YOU FROM MEETINGS, WORK, OR FROM GETTING THINGS NEEDED FOR DAILY LIVING?: NO

## 2025-02-24 SDOH — ECONOMIC STABILITY: TRANSPORTATION INSECURITY
IN THE PAST 12 MONTHS, HAS THE LACK OF TRANSPORTATION KEPT YOU FROM MEDICAL APPOINTMENTS OR FROM GETTING MEDICATIONS?: NO

## 2025-02-24 ASSESSMENT — PATIENT HEALTH QUESTIONNAIRE - PHQ9
10. IF YOU CHECKED OFF ANY PROBLEMS, HOW DIFFICULT HAVE THESE PROBLEMS MADE IT FOR YOU TO DO YOUR WORK, TAKE CARE OF THINGS AT HOME, OR GET ALONG WITH OTHER PEOPLE: SOMEWHAT DIFFICULT
SUM OF ALL RESPONSES TO PHQ9 QUESTIONS 1 AND 2: 2
2. FEELING DOWN, DEPRESSED OR HOPELESS: SEVERAL DAYS
1. LITTLE INTEREST OR PLEASURE IN DOING THINGS: SEVERAL DAYS

## 2025-02-24 ASSESSMENT — SOCIAL DETERMINANTS OF HEALTH (SDOH)
WITHIN THE LAST YEAR, HAVE YOU BEEN KICKED, HIT, SLAPPED, OR OTHERWISE PHYSICALLY HURT BY YOUR PARTNER OR EX-PARTNER?: NO
WITHIN THE LAST YEAR, HAVE YOU BEEN HUMILIATED OR EMOTIONALLY ABUSED IN OTHER WAYS BY YOUR PARTNER OR EX-PARTNER?: NO
WITHIN THE LAST YEAR, HAVE YOU BEEN AFRAID OF YOUR PARTNER OR EX-PARTNER?: NO
WITHIN THE LAST YEAR, HAVE TO BEEN RAPED OR FORCED TO HAVE ANY KIND OF SEXUAL ACTIVITY BY YOUR PARTNER OR EX-PARTNER?: NO
HOW HARD IS IT FOR YOU TO PAY FOR THE VERY BASICS LIKE FOOD, HOUSING, MEDICAL CARE, AND HEATING?: NOT HARD AT ALL

## 2025-02-24 NOTE — TELEPHONE ENCOUNTER
Dr. Capps Pt. left a voicemail message stating was told to follow up with you was seen in ER on Saturday diagnosed with gastritis call her Pt. contact number  is [phone].

## 2025-02-24 NOTE — PROGRESS NOTES
Subjective   Patient ID: Carolina Martinez is a 74 y.o. female who presents for Menopause (Mood Swings/ Depression ).    74-year-old presents today as a new patient  Having mood swings and depression  Denies any hot flashes or night sweats.  She does sometimes sleep at night but is up not because of hot flashes or night sweats  Has a therapist that she is seeing  Is on Lexapro from her primary care provider  States that sometimes she does not want to get out of bed.  States that she feels like she does not need to be here anymore  No homicidal or suicidal ideations  No thoughts of how she would hurt herself  Is seeing therapist weekly  Has been on Lexapro for 3 to 4 weeks.  Will just start to notice a benefit  Patient advised that she does not need hormones based on her symptoms.  These are not menopause symptoms.    ROS  All Normal Review of Systems  Constitutional: no fever, no chills, no recent weight gain, no recent weight loss and no fatigue.    Gastrointestinal: no abdominal pain, no constipation, no nausea, no diarrhea and no vomiting.    Genitourinary: no dysuria, no urinary incontinence, no vaginal dryness, no vaginal itching, no dyspareunia, no pelvic pain, no dysmenorrhea, no sexual problems, no change in urinary frequency, no vaginal discharge, no unexplained vaginal bleeding and no lesion/sore.    Breasts: No masses.  No nipple discharge.  No redness    Objective   /67   Ht 1.524 m (5')   Wt 68.5 kg (151 lb)   LMP  (LMP Unknown)   BMI 29.49 kg/m²    Physical Exam  General: No distress.  Alert and oriented  Psych: No sadness.      Assessment/Plan   1) Depression  Is currently seeing a therapist weekly.  Just started on Lexapro a month ago.  Advised that her symptoms are due to depression and not due to menopause.  No need for hormones at this point      Thank you for your visit to our office today.

## 2025-02-28 LAB
ATRIAL RATE: 70 BPM
P AXIS: 71 DEGREES
P OFFSET: 154 MS
P ONSET: 99 MS
PR INTERVAL: 248 MS
Q ONSET: 223 MS
QRS COUNT: 11 BEATS
QRS DURATION: 70 MS
QT INTERVAL: 386 MS
QTC CALCULATION(BAZETT): 416 MS
QTC FREDERICIA: 406 MS
R AXIS: -5 DEGREES
T AXIS: 35 DEGREES
T OFFSET: 416 MS
VENTRICULAR RATE: 70 BPM

## 2025-04-01 ENCOUNTER — HOSPITAL ENCOUNTER (OUTPATIENT)
Dept: RADIOLOGY | Facility: EXTERNAL LOCATION | Age: 75
Discharge: HOME | End: 2025-04-01

## 2025-04-01 ENCOUNTER — HOSPITAL ENCOUNTER (OUTPATIENT)
Dept: RADIOLOGY | Facility: CLINIC | Age: 75
Discharge: HOME | End: 2025-04-01
Payer: MEDICARE

## 2025-04-01 ENCOUNTER — APPOINTMENT (OUTPATIENT)
Dept: ORTHOPEDIC SURGERY | Facility: CLINIC | Age: 75
End: 2025-04-01
Payer: MEDICARE

## 2025-04-01 DIAGNOSIS — M16.12 ARTHRITIS OF LEFT HIP: ICD-10-CM

## 2025-04-01 PROCEDURE — 99214 OFFICE O/P EST MOD 30 MIN: CPT | Performed by: FAMILY MEDICINE

## 2025-04-01 PROCEDURE — 4010F ACE/ARB THERAPY RXD/TAKEN: CPT | Performed by: FAMILY MEDICINE

## 2025-04-01 PROCEDURE — 73502 X-RAY EXAM HIP UNI 2-3 VIEWS: CPT | Mod: LEFT SIDE | Performed by: RADIOLOGY

## 2025-04-01 PROCEDURE — 20611 DRAIN/INJ JOINT/BURSA W/US: CPT | Performed by: FAMILY MEDICINE

## 2025-04-01 PROCEDURE — 1157F ADVNC CARE PLAN IN RCRD: CPT | Performed by: FAMILY MEDICINE

## 2025-04-01 PROCEDURE — 73502 X-RAY EXAM HIP UNI 2-3 VIEWS: CPT | Mod: LT

## 2025-04-01 PROCEDURE — 1036F TOBACCO NON-USER: CPT | Performed by: FAMILY MEDICINE

## 2025-04-01 PROCEDURE — 1159F MED LIST DOCD IN RCRD: CPT | Performed by: FAMILY MEDICINE

## 2025-04-01 RX ORDER — LIDOCAINE HYDROCHLORIDE 10 MG/ML
4 INJECTION, SOLUTION INFILTRATION; PERINEURAL
Status: COMPLETED | OUTPATIENT
Start: 2025-04-01 | End: 2025-04-01

## 2025-04-01 RX ORDER — TRIAMCINOLONE ACETONIDE 40 MG/ML
80 INJECTION, SUSPENSION INTRA-ARTICULAR; INTRAMUSCULAR
Status: COMPLETED | OUTPATIENT
Start: 2025-04-01 | End: 2025-04-01

## 2025-04-01 RX ADMIN — LIDOCAINE HYDROCHLORIDE 4 ML: 10 INJECTION, SOLUTION INFILTRATION; PERINEURAL at 08:15

## 2025-04-01 RX ADMIN — TRIAMCINOLONE ACETONIDE 80 MG: 40 INJECTION, SUSPENSION INTRA-ARTICULAR; INTRAMUSCULAR at 08:15

## 2025-04-01 NOTE — PROGRESS NOTES
** Please excuse any errors in grammar or translation related to this dictation. Voice recognition software was utilized to prepare this document. **    Assessment & Plan:  Patient here for ongoing management of left hip arthritis.  Reports most recent CSI did not provide as long as relieved as previous as well as having increased daily.  X-rays demonstrate progression of arthritis from 2023.  Non-operative treatment options reviewed below.  - Maintaining a healthy weight: Every pound of bodyweight is about 4-5 pounds through the lower extremity.  Patient has lost weight in the last few months with Ozempic.  - Exercise program to strengthen supportive musculature.    - Activity modifications as needed to include use of cane/walker or braces.  - Steroid injection.  Patient elected to repeat today.  Informed patient it can be repeated again in 3 months.  However it cannot be performed within 90 days of a planned hip replacement.  - After failing non-operative measures, may ultimately require arthroplasty.  Patient now interested in learning more about this option and I referred her to my colleague Dr. Thakur.    All questions answered and patient is agreeable to this plan.       Chief complaint: L hip pain    HPI:  4/1/25: Patient reports the injection from 12/31 provided about 2.5 months relief.  Denies any new injuries.  Tylenol as needed gives her a small amount of relief.  Overall feels symptoms are progressing.    12/31/24:  Patient reports the injection from 9/24 provided close to 3 months relief.  Denies any new injuries.  She has pain in the hip 1-2 times per week.     9/24/24:  Following up with L hip pain secondary to OA.  Patient reports injection from 6/18 provided intermittent relief up until now.  Pain was never very severe.  Denies any new injuries. Tylenol, ibuprofen, and gabapentin as needed.     6/18/24: Patient reports injection from 3/12 provided relief up until the last week.  Denies any new  injuries.  Takes tylenol or ibuprofen as needed.     3/12/24:  Patient reports injection on 12/12/23 provided 3 months of relief. She only started having pain the last few days.  Denies any new injuries. Notes that she is using Ozempic for weight loss and that this has helped lessen the severity of her symptoms.  Will take Tylenol or naproxen intermittently for breakthrough symptoms.     12/12/23:  Patient reports injection on 9/12 provided close to the 3 months relief. Occasional use of Tylenol if needed for breakthrough pain but this is infrequent.   Denies any new injuries.    9/12/23: F/u for left hip arthritis management. Patient reports injection on 6/15 provided close to 3 months relief. No reported progression in symptoms. She is requesting another injection.      6/15/23: Follow-up for left hip pain secondary to arthritis. Reports that steroid injection helped provide pain relief for nearly 3 months. Physical therapy has also been helping with her strengthening. States her therapist told her her range of motion of her hip is also been improving.      3/15/23: 71 y/o F, hx of obesity, DM and HTN, presents with left hip pain. Pain has been intermittently occurring for the past 4-5 years. Most recent pain has been continuous for 4 weeks. Pain is present in lateral hip and into groin. Pain has reduced her ROM. Pain increases with activity along with quick changes in weather temperature. Has been managing with otc analgesics Tylenol and Aleve which do not help at all. No previous injections or therapy for hip.      Patient Active Problem List   Diagnosis    Allergic rhinitis    Anxiety and depression    Arthritis of left hip    Diabetic peripheral neuropathy (Multi)    Elevated coronary artery calcium score    Fatty liver    Hearing loss    History of herpes genitalis    Hyperlipidemia    Hypertension    Mild cognitive impairment    Multiple lung nodules on CT    EMERY on CPAP    Vitamin D deficiency    Enlarged  lymph node in neck    Abnormal otoacoustic emissions test    Acquired hammertoes of both feet    Diffusion capacity of lung (dl), decreased    Elevated erythrocyte sedimentation rate    Encounter for diabetic foot exam (Multi)    Hair thinning    Type 2 diabetes mellitus with hyperglycemia (Multi)    Constipation    Pulmonary emphysema (Multi)    Gastroesophageal reflux disease without esophagitis    Cervical radiculopathy    Arthritis     Past Surgical History:   Procedure Laterality Date     SECTION, LOW TRANSVERSE      SALPINGOOPHORECTOMY Left      Current Outpatient Medications on File Prior to Visit   Medication Sig Dispense Refill    albuterol (Ventolin HFA) 90 mcg/actuation inhaler Inhale 2 puffs every 4 hours if needed for wheezing or shortness of breath. 8 g 5    albuterol 90 mcg/actuation inhaler Inhale 2 puffs every 4 hours if needed for wheezing. 18 g 0    ascorbic acid (Vitamin C) 1,000 mg tablet Take 1 tablet (1,000 mg) by mouth once daily.      aspirin 81 mg EC tablet Take 1 tablet (81 mg) by mouth once daily.      blood pressure monitor kit Check BP daily 1 kit 0    cholecalciferol (Vitamin D-3) 50 MCG (2000 UT) tablet Take by mouth.      docusate sodium (Colace) 100 mg capsule Take 1 capsule (100 mg) by mouth 2 times a day. 180 capsule 3    escitalopram (Lexapro) 5 mg tablet Take 1 tablet (5 mg) by mouth once daily. 30 tablet 2    famotidine (Pepcid) 40 mg tablet Take 1 tablet (40 mg) by mouth 2 times a day. 60 tablet 5    fluticasone (Flonase) 50 mcg/actuation nasal spray Administer 2 sprays into each nostril once daily. 16 g 11    gabapentin (Neurontin) 300 mg capsule Take 1 capsule (300 mg) by mouth 3 times a day. 90 capsule 5    hydroCHLOROthiazide (HYDRODiuril) 25 mg tablet TAKE ONE TABLET BY MOUTH ONCE DAILY 90 tablet 0    lancets 33 gauge misc Use As Directed twice daily      lidocaine (Xylocaine) 5 % ointment       losartan (Cozaar) 100 mg tablet TAKE ONE TABLET BY MOUTH ONCE DAILY  90 tablet 0    metFORMIN  mg 24 hr tablet TAKE ONE TABLET BY MOUTH ONCE DAILY IN THE EVENING WITH MEALS. DO NOT CRUSH, CHEW OR SPLIT 90 tablet 0    metoprolol tartrate (Lopressor) 25 mg tablet TAKE 1 TABLET BY MOUTH TWICE A  tablet 0    OneTouch Ultra Test strip USE 1 STRIP TO TEST ONCE DAILY 100 strip 0    Ozempic 2 mg/dose (8 mg/3 mL) pen injector Inject 2 mg under the skin 1 (one) time per week.      pantoprazole (ProtoNix) 40 mg EC tablet Take 1 tablet (40 mg) by mouth once daily in the morning. Take before meals. Do not crush, chew, or split. 30 tablet 0    potassium chloride CR 10 mEq ER tablet TAKE ONE TABLET BY MOUTH ONCE DAILY 90 tablet 0    psyllium husk, aspartame, (Metamucil Sugar-Free, aspart,) 3.4 gram/5.8 gram powder 2 tsp daily in 8 ounces of liquid 1040 g 3    rosuvastatin (Crestor) 20 mg tablet TAKE ONE TABLET BY MOUTH ONCE DAILY 90 tablet 0    vitamin-B complex split tablet Take 1 tablet (2 half tablet) by mouth once daily.       No current facility-administered medications on file prior to visit.       Exam:  LEFT Hip Exam:  No warmth, erythema or ecchymosis overlying.  Active flexion >90 degrees. ER 40deg, IR 25deg.  NTTP over greater trochanter, glute tendons, proximal ITB  [5]/5 strength of hip flexion, abduction, & adduction  SILT  [ - ]Log roll pain, [ + ]FADIR, [ - ]JOVANA, [ +]Stinchfield    General Exam:  Constitutional - NAD, AAO x 3, conversing appropriately.  HEENT- Normocephalic and atraumatic. EOMI, PERRLA, No scleral icterus. No facial deformities. Hearing grossly normal.  Lungs - Breathing non-labored with normal rate. No accessory muscle use.  CV - Extremities warm and well-perfused, brisk capillary refill present.   Neuro - CN II-XII grossly intact.    Results:  Xrays of left hip obtained 4/1/2025 personally interpreted as moderate to advanced degenerative changes with joint space narrowing, osteophyte formation, and subchondral sclerosis.  Slight progression in  disease when compared to x-rays from 2023.    Lab Results   Component Value Date    HGBA1C 5.6 12/03/2024    HGBA1C 6.0 07/29/2024    CREATININE 0.70 02/22/2025    EGFR >90 02/22/2025     Procedure:  Patient ID: Carolina Martinez is a 74 y.o. female.    L Inj/Asp: L hip joint on 4/1/2025 8:15 AM  Indications: pain  Details: 22 G (spinal) needle, ultrasound-guided anterior approach  Medications: 80 mg triamcinolone acetonide 40 mg/mL; 4 mL lidocaine 10 mg/mL (1 %)  Outcome: tolerated well, no immediate complications    Procedure risk factors to include increased pain, bleeding, infection, neurovascular injury, soft tissue injury, progressive cartilage loss, transient elevation of blood glucose and blood pressure, and adverse reaction to medication were discussed with the patient. Patient understands there is a moderate risk of morbidity from undergoing the procedure.  Procedure, treatment alternatives, risks and benefits explained, specific risks discussed. Consent was given by the patient. Immediately prior to procedure a time out was called to verify the correct patient, procedure, equipment, support staff and site/side marked as required. Patient was prepped and draped in the usual sterile fashion.

## 2025-04-09 ENCOUNTER — TELEPHONE (OUTPATIENT)
Dept: ORTHOPEDIC SURGERY | Facility: CLINIC | Age: 75
End: 2025-04-09
Payer: MEDICARE

## 2025-04-09 NOTE — TELEPHONE ENCOUNTER
Patient left a VM that she has had an increase in pain in the left hip since her injection on 4/1.  I left her a VM to return call to get more details.

## 2025-04-17 DIAGNOSIS — F32.A ANXIETY AND DEPRESSION: ICD-10-CM

## 2025-04-17 DIAGNOSIS — I10 HYPERTENSION, UNSPECIFIED TYPE: ICD-10-CM

## 2025-04-17 DIAGNOSIS — F41.9 ANXIETY AND DEPRESSION: ICD-10-CM

## 2025-04-18 RX ORDER — ESCITALOPRAM OXALATE 5 MG/1
5 TABLET ORAL DAILY
Qty: 90 TABLET | Refills: 0 | Status: SHIPPED | OUTPATIENT
Start: 2025-04-18

## 2025-04-18 RX ORDER — LOSARTAN POTASSIUM 100 MG/1
100 TABLET ORAL DAILY
Qty: 90 TABLET | Refills: 0 | Status: SHIPPED | OUTPATIENT
Start: 2025-04-18

## 2025-05-01 ENCOUNTER — APPOINTMENT (OUTPATIENT)
Dept: PRIMARY CARE | Facility: CLINIC | Age: 75
End: 2025-05-01
Payer: MEDICARE

## 2025-05-01 VITALS
SYSTOLIC BLOOD PRESSURE: 106 MMHG | OXYGEN SATURATION: 98 % | HEIGHT: 60 IN | TEMPERATURE: 97.1 F | BODY MASS INDEX: 30.63 KG/M2 | DIASTOLIC BLOOD PRESSURE: 64 MMHG | RESPIRATION RATE: 17 BRPM | HEART RATE: 69 BPM | WEIGHT: 156 LBS

## 2025-05-01 DIAGNOSIS — E78.5 HYPERLIPIDEMIA, UNSPECIFIED HYPERLIPIDEMIA TYPE: ICD-10-CM

## 2025-05-01 DIAGNOSIS — R93.1 ELEVATED CORONARY ARTERY CALCIUM SCORE: ICD-10-CM

## 2025-05-01 DIAGNOSIS — M16.12 ARTHRITIS OF LEFT HIP: ICD-10-CM

## 2025-05-01 DIAGNOSIS — K59.03 DRUG-INDUCED CONSTIPATION: ICD-10-CM

## 2025-05-01 DIAGNOSIS — G47.33 OSA ON CPAP: ICD-10-CM

## 2025-05-01 DIAGNOSIS — M54.12 CERVICAL RADICULOPATHY: ICD-10-CM

## 2025-05-01 DIAGNOSIS — K21.9 GASTROESOPHAGEAL REFLUX DISEASE WITHOUT ESOPHAGITIS: ICD-10-CM

## 2025-05-01 DIAGNOSIS — K76.0 FATTY LIVER: ICD-10-CM

## 2025-05-01 DIAGNOSIS — I10 HYPERTENSION, UNSPECIFIED TYPE: ICD-10-CM

## 2025-05-01 DIAGNOSIS — E66.09 CLASS 1 OBESITY DUE TO EXCESS CALORIES WITH SERIOUS COMORBIDITY AND BODY MASS INDEX (BMI) OF 30.0 TO 30.9 IN ADULT: ICD-10-CM

## 2025-05-01 DIAGNOSIS — J43.9 PULMONARY EMPHYSEMA, UNSPECIFIED EMPHYSEMA TYPE (MULTI): ICD-10-CM

## 2025-05-01 DIAGNOSIS — E66.811 CLASS 1 OBESITY DUE TO EXCESS CALORIES WITH SERIOUS COMORBIDITY AND BODY MASS INDEX (BMI) OF 30.0 TO 30.9 IN ADULT: ICD-10-CM

## 2025-05-01 DIAGNOSIS — E11.65 TYPE 2 DIABETES MELLITUS WITH HYPERGLYCEMIA, WITHOUT LONG-TERM CURRENT USE OF INSULIN: Primary | ICD-10-CM

## 2025-05-01 PROBLEM — E11.9 ENCOUNTER FOR DIABETIC FOOT EXAM (MULTI): Status: RESOLVED | Noted: 2023-08-21 | Resolved: 2025-05-01

## 2025-05-01 PROBLEM — R70.0 ELEVATED ERYTHROCYTE SEDIMENTATION RATE: Status: RESOLVED | Noted: 2023-08-21 | Resolved: 2025-05-01

## 2025-05-01 LAB
ALBUMIN SERPL BCP-MCNC: 3.8 G/DL (ref 3.4–5)
ALP SERPL-CCNC: 70 U/L (ref 45–117)
ALT SERPL W P-5'-P-CCNC: 22 U/L (ref 16–63)
ANION GAP SERPL CALC-SCNC: 9 MMOL/L (ref 10–20)
AST SERPL W P-5'-P-CCNC: 18 U/L (ref 15–37)
BILIRUB SERPL-MCNC: 0.6 MG/DL (ref 0.2–1)
BUN SERPL-MCNC: 15 MG/DL (ref 7–18)
CALCIUM SERPL-MCNC: 9.3 MG/DL (ref 8.5–10.1)
CHLORIDE SERPL-SCNC: 106 MMOL/L (ref 98–107)
CHOLEST SERPL-MCNC: 195 MG/DL (ref 0–199)
CHOLESTEROL/HDL RATIO: 2.5 (ref 4.2–7)
CO2 SERPL-SCNC: 34 MMOL/L (ref 21–32)
CREAT SERPL-MCNC: 0.57 MG/DL (ref 0.6–1.1)
CREAT UR STRIP-MCNC: 50 MG/DL
EGFRCR SERPLBLD CKD-EPI 2021: >90 ML/MIN/1.73M*2
GLUCOSE SERPL-MCNC: 93 MG/DL (ref 74–100)
HBA1C MFR BLD: 5.5 %
HDLC SERPL-MCNC: 78 MG/DL (ref 40–59)
IS PATIENT FASTING: YES
LDLC SERPL DIRECT ASSAY-MCNC: 98 MG/DL (ref 0–100)
MICROALBUMIN UR TEST STR-MCNC: 10 MG/L
POTASSIUM SERPL-SCNC: 4.9 MMOL/L (ref 3.5–5.1)
PROT SERPL-MCNC: 6.6 G/DL (ref 6.4–8.2)
PROT/CREAT UR: <30 UG/MG CREAT
SODIUM SERPL-SCNC: 144 MMOL/L (ref 136–145)
TRIGL SERPL-MCNC: 42 MG/DL

## 2025-05-01 PROCEDURE — 4010F ACE/ARB THERAPY RXD/TAKEN: CPT | Performed by: PEDIATRICS

## 2025-05-01 PROCEDURE — 83036 HEMOGLOBIN GLYCOSYLATED A1C: CPT | Performed by: PEDIATRICS

## 2025-05-01 PROCEDURE — 3048F LDL-C <100 MG/DL: CPT | Performed by: PEDIATRICS

## 2025-05-01 PROCEDURE — 3074F SYST BP LT 130 MM HG: CPT | Performed by: PEDIATRICS

## 2025-05-01 PROCEDURE — 3078F DIAST BP <80 MM HG: CPT | Performed by: PEDIATRICS

## 2025-05-01 PROCEDURE — 1159F MED LIST DOCD IN RCRD: CPT | Performed by: PEDIATRICS

## 2025-05-01 PROCEDURE — 1036F TOBACCO NON-USER: CPT | Performed by: PEDIATRICS

## 2025-05-01 PROCEDURE — 1126F AMNT PAIN NOTED NONE PRSNT: CPT | Performed by: PEDIATRICS

## 2025-05-01 PROCEDURE — 3008F BODY MASS INDEX DOCD: CPT | Performed by: PEDIATRICS

## 2025-05-01 PROCEDURE — 82043 UR ALBUMIN QUANTITATIVE: CPT | Mod: CLIA WAIVED TEST | Performed by: PEDIATRICS

## 2025-05-01 PROCEDURE — 3044F HG A1C LEVEL LT 7.0%: CPT | Performed by: PEDIATRICS

## 2025-05-01 PROCEDURE — 80061 LIPID PANEL: CPT | Performed by: PEDIATRICS

## 2025-05-01 PROCEDURE — 99214 OFFICE O/P EST MOD 30 MIN: CPT | Performed by: PEDIATRICS

## 2025-05-01 PROCEDURE — 80053 COMPREHEN METABOLIC PANEL: CPT | Performed by: PEDIATRICS

## 2025-05-01 PROCEDURE — 82570 ASSAY OF URINE CREATININE: CPT | Mod: CLIA WAIVED TEST | Performed by: PEDIATRICS

## 2025-05-01 PROCEDURE — G2211 COMPLEX E/M VISIT ADD ON: HCPCS | Performed by: PEDIATRICS

## 2025-05-01 PROCEDURE — 1157F ADVNC CARE PLAN IN RCRD: CPT | Performed by: PEDIATRICS

## 2025-05-01 RX ORDER — POTASSIUM CHLORIDE 750 MG/1
TABLET, FILM COATED, EXTENDED RELEASE ORAL
Qty: 90 TABLET | Refills: 1 | Status: SHIPPED | OUTPATIENT
Start: 2025-05-01

## 2025-05-01 ASSESSMENT — PAIN SCALES - GENERAL: PAINLEVEL_OUTOF10: 0-NO PAIN

## 2025-05-01 ASSESSMENT — PATIENT HEALTH QUESTIONNAIRE - PHQ9
1. LITTLE INTEREST OR PLEASURE IN DOING THINGS: NOT AT ALL
SUM OF ALL RESPONSES TO PHQ9 QUESTIONS 1 AND 2: 0
2. FEELING DOWN, DEPRESSED OR HOPELESS: NOT AT ALL

## 2025-05-01 ASSESSMENT — ENCOUNTER SYMPTOMS: HYPERTENSION: 1

## 2025-05-01 NOTE — PROGRESS NOTES
Subjective   Patient ID: Carolina Martinez is a 74 y.o. female who presents for Hyperlipidemia and Hypertension.    Hyperlipidemia    Hypertension       Sees podiatry next week and eye doctor in Oct  Mammogram will be done this month    Review of Systems    Objective   /64 (BP Location: Left arm, Patient Position: Sitting, BP Cuff Size: Adult)   Pulse 69   Temp 36.2 °C (97.1 °F) (Temporal)   Resp 17   Ht 1.524 m (5')   Wt 70.8 kg (156 lb)   LMP  (LMP Unknown)   SpO2 98%   BMI 30.47 kg/m²     Physical Exam  Lungs clear  Heart RRR  Abd soft  No edema  No bruit  Assessment/Plan   Problem List Items Addressed This Visit           ICD-10-CM    Arthritis of left hip M16.12    Thinking about surgery         Elevated coronary artery calcium score R93.1    Relevant Orders    Lipid Panel    Fatty liver K76.0    Fibroscan due 2027         Relevant Orders    Comprehensive Metabolic Panel    Hyperlipidemia E78.5    Relevant Orders    Lipid Panel    Hypertension I10    Home BP good; feels lightheaded when she bends down         Relevant Medications    potassium chloride CR 10 mEq ER tablet    EMERY on CPAP G47.33    Uses CPAP         Type 2 diabetes mellitus with hyperglycemia (Multi) - Primary E11.65    Sugars are around 110         Relevant Orders    Hemoglobin A1C    POCT Albumin Random Urine docked device    Constipation K59.00    Ozempic makes her constipated         Pulmonary emphysema (Multi) J43.9    Sees pulmonary; seldom needs albuterol         Gastroesophageal reflux disease without esophagitis K21.9    Controlled on Famotidine         Cervical radiculopathy M54.12    Gabapentin helps but makes her groggy; not often         Class 1 obesity due to excess calories with serious comorbidity and body mass index (BMI) of 30.0 to 30.9 in adult E66.811, E66.09, Z68.30

## 2025-05-01 NOTE — PATIENT INSTRUCTIONS
Take Colace 100 mg twice a day (docusate)  Use stationary bike  See [facility]--[phone] ext [phone]  Return in 6 months

## 2025-05-02 DIAGNOSIS — E78.5 HYPERLIPIDEMIA, UNSPECIFIED HYPERLIPIDEMIA TYPE: Primary | ICD-10-CM

## 2025-05-02 RX ORDER — EZETIMIBE 10 MG/1
10 TABLET ORAL DAILY
Qty: 90 TABLET | Refills: 1 | Status: SHIPPED | OUTPATIENT
Start: 2025-05-02 | End: 2025-10-29

## 2025-05-05 ENCOUNTER — HOSPITAL ENCOUNTER (OUTPATIENT)
Dept: RADIOLOGY | Facility: CLINIC | Age: 75
Discharge: HOME | End: 2025-05-05
Payer: MEDICARE

## 2025-05-05 VITALS — BODY MASS INDEX: 30.63 KG/M2 | HEIGHT: 60 IN | WEIGHT: 156 LBS

## 2025-05-05 DIAGNOSIS — Z12.31 SCREENING MAMMOGRAM FOR BREAST CANCER: ICD-10-CM

## 2025-05-05 PROCEDURE — 77067 SCR MAMMO BI INCL CAD: CPT | Performed by: RADIOLOGY

## 2025-05-05 PROCEDURE — 77063 BREAST TOMOSYNTHESIS BI: CPT | Performed by: RADIOLOGY

## 2025-05-05 PROCEDURE — 77063 BREAST TOMOSYNTHESIS BI: CPT

## 2025-05-07 ENCOUNTER — TELEPHONE (OUTPATIENT)
Dept: PRIMARY CARE | Facility: CLINIC | Age: 75
End: 2025-05-07
Payer: MEDICARE

## 2025-05-07 NOTE — TELEPHONE ENCOUNTER
----- Message from Ashtyn Capps sent at 5/2/2025  4:31 PM EDT -----  The LDL used to be below 70; I discussed with her cardiologist who recommends adding Zetia;   ----- Message -----  From: Lab, Background User  Sent: 5/1/2025  10:24 AM EDT  To: Ashtyn Capps MD

## 2025-05-07 NOTE — TELEPHONE ENCOUNTER
I notified Pt. Carolina Martinez  of Dr. Capps message. Pt. expressed understanding of the message given.

## 2025-05-14 ENCOUNTER — APPOINTMENT (OUTPATIENT)
Dept: GASTROENTEROLOGY | Facility: CLINIC | Age: 75
End: 2025-05-14
Payer: MEDICARE

## 2025-05-14 VITALS — HEART RATE: 95 BPM | OXYGEN SATURATION: 97 %

## 2025-05-14 DIAGNOSIS — K59.03 DRUG-INDUCED CONSTIPATION: ICD-10-CM

## 2025-05-14 DIAGNOSIS — K21.9 GASTROESOPHAGEAL REFLUX DISEASE, UNSPECIFIED WHETHER ESOPHAGITIS PRESENT: Primary | ICD-10-CM

## 2025-05-14 PROCEDURE — 4010F ACE/ARB THERAPY RXD/TAKEN: CPT | Performed by: INTERNAL MEDICINE

## 2025-05-14 PROCEDURE — 3048F LDL-C <100 MG/DL: CPT | Performed by: INTERNAL MEDICINE

## 2025-05-14 PROCEDURE — 3044F HG A1C LEVEL LT 7.0%: CPT | Performed by: INTERNAL MEDICINE

## 2025-05-14 PROCEDURE — 99214 OFFICE O/P EST MOD 30 MIN: CPT | Performed by: INTERNAL MEDICINE

## 2025-05-14 ASSESSMENT — ENCOUNTER SYMPTOMS
HEADACHES: 1
COUGH: 1
DIZZINESS: 1
CONSTITUTIONAL NEGATIVE: 1
ARTHRALGIAS: 1
PALPITATIONS: 1
MYALGIAS: 1
SHORTNESS OF BREATH: 1
BACK PAIN: 1

## 2025-05-14 NOTE — PROGRESS NOTES
Subjective     History of Present Illness:     73yo with HTN, DL, DM, hepatic steatosis, EMERY, emphysema, GERD, constipation referred for gastritis. She was seen in ER in Feb with c/o abdominal discomfort x 3 weeks, in setting of constipation on ozempic . CT scan negative, started on PPI . Follow up with primary care, changed to famotidine 40mg bid and referred to GI. Continues to feel well without dyspepsia.   Sometimes constipated and sometimes nausea since starting ozempic .  Has not been taking colace, appears to be taking dulcolax laxative and miralax but not daily. When take both will have good bowel movement, currently taking 2 x per week.  Denies any rectal bleeding .   No melena, no nsaids.  Denies dysphagia.      Wt loss of 50 pounds on ozempic    CT a/p 2/2025- wnl    EGD 2/2024- small hiatal hernia, otherwise normal, mid esophageal bx negative.  Colonoscopy 2/2024- one TA diverticulosis, internal hemorrhoids      Review of Systems  Review of Systems   Constitutional: Negative.    HENT:  Positive for hearing loss.    Eyes:  Positive for visual disturbance.   Respiratory:  Positive for cough and shortness of breath.    Cardiovascular:  Positive for palpitations.   Genitourinary: Negative.    Musculoskeletal:  Positive for arthralgias, back pain and myalgias.   Neurological:  Positive for dizziness and headaches.       Past Medical History  Medical History[1]    Social History  Social History[2]    Family History  Family History[3]     Allergies  RX Allergies[4]    Medications  Current Outpatient Medications   Medication Instructions    albuterol (Ventolin HFA) 90 mcg/actuation inhaler 2 puffs, inhalation, Every 4 hours PRN    albuterol 90 mcg/actuation inhaler 2 puffs, inhalation, Every 4 hours PRN    ascorbic acid (VITAMIN C) 1,000 mg, Daily    aspirin 81 mg EC tablet 1 tablet, Daily    blood pressure monitor kit Check BP daily    cholecalciferol (Vitamin D-3) 50 MCG (2000 UT) tablet Take by mouth.     docusate sodium (COLACE) 100 mg, oral, 2 times daily    escitalopram (LEXAPRO) 5 mg, oral, Daily    ezetimibe (ZETIA) 10 mg, oral, Daily    famotidine (PEPCID) 40 mg, oral, 2 times daily    fluticasone (Flonase) 50 mcg/actuation nasal spray 2 sprays, Each Nostril, Daily    gabapentin (NEURONTIN) 300 mg, oral, 3 times daily    hydroCHLOROthiazide (HYDRODIURIL) 25 mg, oral, Daily    lancets 33 gauge misc Use As Directed twice daily    lidocaine (Xylocaine) 5 % ointment     losartan (COZAAR) 100 mg, oral, Daily    metFORMIN  mg 24 hr tablet TAKE ONE TABLET BY MOUTH ONCE DAILY IN THE EVENING WITH MEALS. DO NOT CRUSH, CHEW OR SPLIT    metoprolol tartrate (LOPRESSOR) 25 mg, oral, 2 times daily    OneTouch Ultra Test strip USE 1 STRIP TO TEST ONCE DAILY    Ozempic 2 mg, Once Weekly    potassium chloride CR 10 mEq ER tablet TAKE ONE TABLET BY MOUTH ONCE DAILY    psyllium husk, aspartame, (Metamucil Sugar-Free, aspart,) 3.4 gram/5.8 gram powder 2 tsp daily in 8 ounces of liquid    rosuvastatin (CRESTOR) 20 mg, oral, Daily    vitamin-B complex split tablet 1 tablet, Daily        Objective   There were no vitals taken for this visit.   Physical Exam  Cardiovascular:      Rate and Rhythm: Normal rate and regular rhythm.   Pulmonary:      Effort: Pulmonary effort is normal. No respiratory distress.      Breath sounds: Normal breath sounds. No wheezing.   Abdominal:      General: Bowel sounds are normal. There is no distension.      Palpations: Abdomen is soft. There is no mass.      Tenderness: There is no abdominal tenderness.   Neurological:      Mental Status: She is alert.               Assessment/Plan   75yo with HTN, DL, DM, hepatic steatosis, EMERY, emphysema, GERD, constipation referred for dyspepsia, and constipation.  Dyspepsia improved on acid reduction, initally PPI, now on H2 blocker BID.  Reports constipation since starting ozempic, responding to OTC laxatives but is not taking daily.  Recommend full dose  miralax 1 cap daily and may continue dulcolax laxative 1-2 daily or as needed.  Colonoscopy up to date, and no alarm sx.    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    Carotidynia    Other specified soft tissue disorders 09/15/2021    Calf swelling    Personal history of diseases of the blood and blood-forming organs and certain disorders involving the immune mechanism 2021    History of anemia    Personal history of other diseases of the circulatory system 2021    History of intermittent claudication    Personal history of other diseases of the respiratory system 2022    History of chronic obstructive lung disease    Personal history of other specified conditions 2022    History of dizziness    Shortness of breath 2021    SOB (shortness of breath) on exertion   [2]   Social History  Tobacco Use    Smoking status: Former     Current packs/day: 0.00     Average packs/day: 1 pack/day for 33.0 years (33.0 ttl pk-yrs)     Types: Cigarettes     Start date:      Quit date: 2006     Years since quittin.3    Smokeless tobacco: Never   Vaping Use    Vaping status: Never Used   Substance Use Topics    Alcohol use: Yes     Alcohol/week: 1.0 standard drink of alcohol     Types: 1 Glasses of wine per week     Comment: social    Drug use: Never   [3]   Family History  Problem Relation Name Age of Onset    Heart disease Mother      Heart disease Father      Coronary artery disease Sister          Stent patent    Lung cancer Sister      Coronary artery disease Brother      Heart attack Brother      Suicide Attempts Daughter      Breast cancer Father's Sister  85   [4]   Allergies  Allergen Reactions    Codeine Hives, Itching and Rash    Ace Inhibitors Swelling and Other    Atorvastatin Other    Penicillin Unknown     Sulfa (Sulfonamide Antibiotics) Other and Unknown

## 2025-05-21 NOTE — PROGRESS NOTES
FUV for diabetes. LV with me 2024.    History of Present Illness   74 y.o. female with hx of type 2 diabetes, obesity class III, EMERY, HTN, and HLD.     Dx:   HbA1c: 5.5% (2025), 5.6% (12/3/2024), 6.0% (2024), 5.8% (2024), 6.1% (2024), 6.0% (2023), 5.9%(2023), 6.0% (2023), 6.8% (2022), 6.6% (2022), 6.5% (2021)  Current regimen: metformin  mg Qevening, Ozempic 2 mg/week  Past medications: glipizide, Trulicity (injection site rash)  Complications: neuropathy  Comorbidities: obesity, HTN, HLD, EMERY     SMBG: once a day (fasting)  Fastins-120s     Hypoglycemia: no     Diet: dinner is biggest meal  Grazes during the day on cookies, peanuts, pieces of chicken  Dinner (*biggest meal): subway/corned beef sandwich     Exercise: nothing regular    Today:  -reports periodic episodes of depression that resolves on its own  -occurs during 2 weeks of the month    ROS  General: no fever or chills  CV: no chest pain   Respiratory: no shortness of breath  MSK: no lower extremity edema  Neuro: no headache or dizziness  See HPI for Endocrine ROS    Past Medical History:   Diagnosis Date    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    Carotidynia    Other specified soft tissue disorders 09/15/2021    Calf swelling    Personal history of diseases of the blood and blood-forming organs and certain disorders involving the immune mechanism 2021    History of anemia    Personal history of other diseases of the circulatory system 2021    History of intermittent claudication    Personal history of other diseases of the respiratory system 2022    History of chronic obstructive lung disease    Personal history of other specified conditions 2022    History of dizziness    Shortness of breath 2021    SOB (shortness of breath) on exertion       Past Surgical History:   Procedure Laterality Date     SECTION, LOW TRANSVERSE      SALPINGOOPHORECTOMY Left         Social History     Socioeconomic History    Marital status:      Spouse name: Not on file    Number of children: 2    Years of education: Not on file    Highest education level: Not on file   Occupational History    Not on file   Tobacco Use    Smoking status: Former     Current packs/day: 0.00     Average packs/day: 1 pack/day for 33.0 years (33.0 ttl pk-yrs)     Types: Cigarettes     Start date:      Quit date: 2006     Years since quittin.3    Smokeless tobacco: Never   Vaping Use    Vaping status: Never Used   Substance and Sexual Activity    Alcohol use: Yes     Alcohol/week: 1.0 standard drink of alcohol     Types: 1 Glasses of wine per week     Comment: social    Drug use: Never    Sexual activity: Not Currently     Partners: Male     Birth control/protection: Post-menopausal   Other Topics Concern    Not on file   Social History Narrative    Not on file     Social Drivers of Health     Financial Resource Strain: Low Risk  (2025)    Overall Financial Resource Strain (CARDIA)     Difficulty of Paying Living Expenses: Not hard at all   Food Insecurity: No Food Insecurity (2024)    Hunger Vital Sign     Worried About Running Out of Food in the Last Year: Never true     Ran Out of Food in the Last Year: Never true   Transportation Needs: No Transportation Needs (2025)    PRAPARE - Transportation     Lack of Transportation (Medical): No     Lack of Transportation (Non-Medical): No   Physical Activity: Not on file   Stress: Not on file   Social Connections: Not on file   Intimate Partner Violence: Not At Risk (2025)    Humiliation, Afraid, Rape, and Kick questionnaire     Fear of Current or Ex-Partner: No     Emotionally Abused: No     Physically Abused: No     Sexually Abused: No   Housing Stability: Low Risk  (2025)    Housing Stability Vital Sign     Unable to Pay for Housing in the Last Year: No     Number of Times Moved in the Last Year: 0     Homeless in the Last  Year: No       Physical Exam   vitals were not taken for this visit.   General: not in acute distress  HEENT: CARMENCITA, EOMI  Thyroid: no goiter  Neuro: alert and oriented x 3    Current Outpatient Medications   Medication Sig Dispense Refill    albuterol (Ventolin HFA) 90 mcg/actuation inhaler Inhale 2 puffs every 4 hours if needed for wheezing or shortness of breath. 8 g 5    albuterol 90 mcg/actuation inhaler Inhale 2 puffs every 4 hours if needed for wheezing. 18 g 0    ascorbic acid (Vitamin C) 1,000 mg tablet Take 1 tablet (1,000 mg) by mouth once daily.      aspirin 81 mg EC tablet Take 1 tablet (81 mg) by mouth once daily.      blood pressure monitor kit Check BP daily 1 kit 0    cholecalciferol (Vitamin D-3) 50 MCG (2000 UT) tablet Take by mouth.      docusate sodium (Colace) 100 mg capsule Take 1 capsule (100 mg) by mouth 2 times a day. 180 capsule 3    escitalopram (Lexapro) 5 mg tablet TAKE ONE TABLET BY MOUTH ONCE DAILY 90 tablet 0    ezetimibe (Zetia) 10 mg tablet Take 1 tablet (10 mg) by mouth once daily. 90 tablet 1    famotidine (Pepcid) 40 mg tablet Take 1 tablet (40 mg) by mouth 2 times a day. 60 tablet 5    fluticasone (Flonase) 50 mcg/actuation nasal spray Administer 2 sprays into each nostril once daily. 16 g 11    gabapentin (Neurontin) 300 mg capsule Take 1 capsule (300 mg) by mouth 3 times a day. 90 capsule 5    hydroCHLOROthiazide (HYDRODiuril) 25 mg tablet TAKE ONE TABLET BY MOUTH ONCE DAILY 90 tablet 0    lancets 33 gauge misc Use As Directed twice daily      lidocaine (Xylocaine) 5 % ointment       losartan (Cozaar) 100 mg tablet TAKE ONE TABLET BY MOUTH ONCE DAILY 90 tablet 0    metFORMIN  mg 24 hr tablet TAKE ONE TABLET BY MOUTH ONCE DAILY IN THE EVENING WITH MEALS. DO NOT CRUSH, CHEW OR SPLIT 90 tablet 0    metoprolol tartrate (Lopressor) 25 mg tablet TAKE 1 TABLET BY MOUTH TWICE A  tablet 0    OneTouch Ultra Test strip USE 1 STRIP TO TEST ONCE DAILY 100 strip 0    Ozempic 2  mg/dose (8 mg/3 mL) pen injector Inject 2 mg under the skin 1 (one) time per week.      potassium chloride CR 10 mEq ER tablet TAKE ONE TABLET BY MOUTH ONCE DAILY 90 tablet 1    psyllium husk, aspartame, (Metamucil Sugar-Free, aspart,) 3.4 gram/5.8 gram powder 2 tsp daily in 8 ounces of liquid 1040 g 3    rosuvastatin (Crestor) 20 mg tablet TAKE ONE TABLET BY MOUTH ONCE DAILY 90 tablet 0    vitamin-B complex split tablet Take 1 tablet (2 half tablet) by mouth once daily.       No current facility-administered medications for this visit.       Assessment and Plan  74 y.o. female with hx of type 2 diabetes, obesity class III, EMERY, HTN, and HLD, here for management of diabetes and obesity.     1. Type 2 diabetes mellitus, controlled  2. Class III obesity  HbA1c: 5.5% (05/2025),5.6% (12/3/2024), 6.0% (07/2024), 6.1% (04/2024), 6.0% 11/29/2023), 5.9% (7/18/2023), 6.0% (03/2023), 6.8% (9/7/2022), 6.6% (5/17/2022), 6.5% (09/2021)  Current regimen: metformin 500 mg Qevening, Ozempic 2 mg/week  Eye exam: no DR  (10/2024)  Urine microalbumin: neg (05/2025)  Podiatry: LV 10/2024  Lipids: HDL 78, LDL 98, TG 42 (05/2025) on rosuvastatin 20 mg     A1c: 5.5%!  Maintaining excellent glycemic control.    She can transition her care back to her PCP, but I am afraid that there may be issues with submitting her [facility] PAP in a timely manner.    PLAN:  -continue Ozempic 2 mg/week   -continue metformin  mg Qevening  -check blood sugars once a day (alternate fasting and before dinner)    Follow-up in December with

## 2025-05-27 ENCOUNTER — APPOINTMENT (OUTPATIENT)
Dept: ENDOCRINOLOGY | Facility: CLINIC | Age: 75
End: 2025-05-27
Payer: MEDICARE

## 2025-05-27 VITALS
BODY MASS INDEX: 30.23 KG/M2 | TEMPERATURE: 97.1 F | HEART RATE: 75 BPM | HEIGHT: 60 IN | SYSTOLIC BLOOD PRESSURE: 110 MMHG | DIASTOLIC BLOOD PRESSURE: 73 MMHG | WEIGHT: 154 LBS

## 2025-05-27 DIAGNOSIS — E11.65 TYPE 2 DIABETES MELLITUS WITH HYPERGLYCEMIA, WITHOUT LONG-TERM CURRENT USE OF INSULIN: Primary | ICD-10-CM

## 2025-05-27 DIAGNOSIS — E11.9 CONTROLLED TYPE 2 DIABETES MELLITUS WITHOUT COMPLICATION, UNSPECIFIED WHETHER LONG TERM INSULIN USE: ICD-10-CM

## 2025-05-27 DIAGNOSIS — I10 HYPERTENSION, UNSPECIFIED TYPE: ICD-10-CM

## 2025-05-27 PROCEDURE — 3074F SYST BP LT 130 MM HG: CPT | Performed by: STUDENT IN AN ORGANIZED HEALTH CARE EDUCATION/TRAINING PROGRAM

## 2025-05-27 PROCEDURE — 3078F DIAST BP <80 MM HG: CPT | Performed by: STUDENT IN AN ORGANIZED HEALTH CARE EDUCATION/TRAINING PROGRAM

## 2025-05-27 PROCEDURE — 1159F MED LIST DOCD IN RCRD: CPT | Performed by: STUDENT IN AN ORGANIZED HEALTH CARE EDUCATION/TRAINING PROGRAM

## 2025-05-27 PROCEDURE — G2211 COMPLEX E/M VISIT ADD ON: HCPCS | Performed by: STUDENT IN AN ORGANIZED HEALTH CARE EDUCATION/TRAINING PROGRAM

## 2025-05-27 PROCEDURE — 3044F HG A1C LEVEL LT 7.0%: CPT | Performed by: STUDENT IN AN ORGANIZED HEALTH CARE EDUCATION/TRAINING PROGRAM

## 2025-05-27 PROCEDURE — 3008F BODY MASS INDEX DOCD: CPT | Performed by: STUDENT IN AN ORGANIZED HEALTH CARE EDUCATION/TRAINING PROGRAM

## 2025-05-27 PROCEDURE — 99214 OFFICE O/P EST MOD 30 MIN: CPT | Performed by: STUDENT IN AN ORGANIZED HEALTH CARE EDUCATION/TRAINING PROGRAM

## 2025-05-27 PROCEDURE — 3048F LDL-C <100 MG/DL: CPT | Performed by: STUDENT IN AN ORGANIZED HEALTH CARE EDUCATION/TRAINING PROGRAM

## 2025-05-27 PROCEDURE — 1036F TOBACCO NON-USER: CPT | Performed by: STUDENT IN AN ORGANIZED HEALTH CARE EDUCATION/TRAINING PROGRAM

## 2025-05-27 PROCEDURE — 4010F ACE/ARB THERAPY RXD/TAKEN: CPT | Performed by: STUDENT IN AN ORGANIZED HEALTH CARE EDUCATION/TRAINING PROGRAM

## 2025-05-27 RX ORDER — ROSUVASTATIN CALCIUM 20 MG/1
20 TABLET, COATED ORAL DAILY
Qty: 90 TABLET | Refills: 3 | Status: SHIPPED | OUTPATIENT
Start: 2025-05-27

## 2025-05-27 ASSESSMENT — ENCOUNTER SYMPTOMS
OCCASIONAL FEELINGS OF UNSTEADINESS: 0
DEPRESSION: 0
LOSS OF SENSATION IN FEET: 1

## 2025-05-27 NOTE — PATIENT INSTRUCTIONS
You should be taking 2 cholesterol medications:   rosuvastatin 20 mg, 1 tab, once a day  EZETIMIBE 10 mg, 1 tab, once a day    2.  Continue Ozempic 2 mg, once a week

## 2025-06-03 ENCOUNTER — APPOINTMENT (OUTPATIENT)
Dept: ENDOCRINOLOGY | Facility: CLINIC | Age: 75
End: 2025-06-03
Payer: MEDICARE

## 2025-06-10 ENCOUNTER — APPOINTMENT (OUTPATIENT)
Dept: NEUROLOGY | Facility: HOSPITAL | Age: 75
End: 2025-06-10
Payer: MEDICARE

## 2025-06-17 ENCOUNTER — TELEPHONE (OUTPATIENT)
Dept: ENDOCRINOLOGY | Facility: CLINIC | Age: 75
End: 2025-06-17
Payer: MEDICARE

## 2025-06-17 ENCOUNTER — OFFICE VISIT (OUTPATIENT)
Dept: ORTHOPEDIC SURGERY | Facility: HOSPITAL | Age: 75
End: 2025-06-17
Payer: MEDICARE

## 2025-06-17 DIAGNOSIS — M47.812 CERVICAL SPONDYLOSIS: Primary | ICD-10-CM

## 2025-06-17 DIAGNOSIS — G56.02 CARPAL TUNNEL SYNDROME OF LEFT WRIST: ICD-10-CM

## 2025-06-17 PROCEDURE — 3044F HG A1C LEVEL LT 7.0%: CPT | Performed by: STUDENT IN AN ORGANIZED HEALTH CARE EDUCATION/TRAINING PROGRAM

## 2025-06-17 PROCEDURE — 1159F MED LIST DOCD IN RCRD: CPT | Performed by: STUDENT IN AN ORGANIZED HEALTH CARE EDUCATION/TRAINING PROGRAM

## 2025-06-17 PROCEDURE — 3048F LDL-C <100 MG/DL: CPT | Performed by: STUDENT IN AN ORGANIZED HEALTH CARE EDUCATION/TRAINING PROGRAM

## 2025-06-17 PROCEDURE — 99202 OFFICE O/P NEW SF 15 MIN: CPT | Performed by: STUDENT IN AN ORGANIZED HEALTH CARE EDUCATION/TRAINING PROGRAM

## 2025-06-17 PROCEDURE — 4010F ACE/ARB THERAPY RXD/TAKEN: CPT | Performed by: STUDENT IN AN ORGANIZED HEALTH CARE EDUCATION/TRAINING PROGRAM

## 2025-06-17 PROCEDURE — 1036F TOBACCO NON-USER: CPT | Performed by: STUDENT IN AN ORGANIZED HEALTH CARE EDUCATION/TRAINING PROGRAM

## 2025-06-17 PROCEDURE — 99204 OFFICE O/P NEW MOD 45 MIN: CPT | Performed by: STUDENT IN AN ORGANIZED HEALTH CARE EDUCATION/TRAINING PROGRAM

## 2025-06-17 RX ORDER — METHOCARBAMOL 500 MG/1
500 TABLET, FILM COATED ORAL 3 TIMES DAILY PRN
Qty: 90 TABLET | Refills: 1 | Status: SHIPPED | OUTPATIENT
Start: 2025-06-17 | End: 2025-08-16

## 2025-06-17 RX ORDER — MELOXICAM 15 MG/1
15 TABLET ORAL DAILY PRN
Qty: 30 TABLET | Refills: 1 | Status: SHIPPED | OUTPATIENT
Start: 2025-06-17 | End: 2025-08-16

## 2025-06-17 ASSESSMENT — PAIN DESCRIPTION - DESCRIPTORS: DESCRIPTORS: ACHING

## 2025-06-17 ASSESSMENT — PAIN SCALES - GENERAL: PAINLEVEL_OUTOF10: 10 - WORST POSSIBLE PAIN

## 2025-06-17 ASSESSMENT — PAIN - FUNCTIONAL ASSESSMENT: PAIN_FUNCTIONAL_ASSESSMENT: 0-10

## 2025-06-17 NOTE — PROGRESS NOTES
"  Assessment     Carolina is a 74 y.o. female with significant past medical history of DM2 (HbA1c: 5.5% (05/2025), obesity, EMERY, HTN, HLD, who presents with neck pain and left leg pain.  The patient's symptoms, clinical exam and imaging studies are suggestive of cervical spondylosis, left carpal tunnel syndrome.  The other possible differential diagnosis(es) include(s):  cervical radiculitis .      Plan     At this time, I would like to make the following recommendation/plan:  -  Physical Therapy: provided [facility] complete PT recently   -  Medication: meloxicam, and methocarbamol prn ordered   -  Studies: Interpreted: CT of C spine from 04/02/24: multilevel spondylosis most severe at C5-C6 with foraminal stenosis R>L  -  advise to trial wrist splints at night  -  reviewed Dr. Lester's note: \" After failing non-operative measures, may ultimately require arthroplasty.  Patient now interested in learning more about this option and I referred her to my colleague Dr. Thakur. \"  - I discussed that it is unlikely that her left leg symptoms are secondary to her neck pathology    Follow up:  Follow up in 6 weeks.    Subjective    Chief Complaint: neck pain    History of Present Illness:  Carolina Martinez is a 74 y.o. female, retired, with pertinent PMH of DM2 (HbA1c: 5.5% (05/2025), obesity, EMERY, HTN, HLD presents today for neck pain (L>R), long covid. Pain first started on 2019, associated with covid infection. The pain as located in the mastoid process to the shoulder .    Pain:        Severity:  Carolina Martinez states pain is: 10/10 at it's worst. On average pain is 6/10 (Scale 0-no pain, 10-worst pain)      Quality:  aching.       Radiating: down the lateral arm, and left body to the anterior left thigh      Aggravating Factors: turning her head to the right, bending her neck      Alleviating Factors:  salonpas.      Time of day: regardless of time of day      Associated symptoms:  intermittent numbness and tingling in the " hands and feet; no balance problems     Physical Therapy/Occupational Therapy/Other Modalities:    - Chiropractor/OMT: hasn't tried   - Acupuncture: helpful temporarily but was cost prohibitive  - Medical message:  hasn't tried   - Last PT session: 03/2025  # of sessions completed: 10 with mild benefit      Topicals:   ICE/Heat: mild benefit  Topicals (Capsicin/Diclofenac gel/Salonpas/Lidocaine): salonpas    Medications:   - Over the counter : (Tylenol, NSAIDs)  tylenol not helpful   - Steroid:  hasn't tried   - Gabapentin/Lyrica: gabapentin 300mg nightly (can't tolerate increase); unclear benefit  - Muscle relaxer: tizanidine not helpful   - Duloxetine/Topamax/oxcarbazepine:  hasn't tried   - Opiates: hasn't tried     Current Medications[1]    RX Allergies[2]    Injections:    Date/Injection Type/Location/%relief/ Lasting  - 04/01/25, 12/31/24, 09/24/24, 06/18/24  left hip CSI; initially helpful no longer beneficial    Surgery:  Date/Type/Location/%relief/ Lasting  - no hx of orthopedics surgeries   Surgical History[3]    Medical History[4]      ROS:  - Sleep: Sleep is occasionally disrupted secondary to pain  - Bowel/Bladder: Denies bowel/bladder dysfunction  - Falls: Denies fall  - Weight changes:  reports 55 lbs wet loss with ozempic  - Mood/Psych: occasional feelings of anxiety or depression    12-point review of systems was completed and is otherwise negative except as noted in the HPI.      Social Hx:  - Home: Lives with  in a apartment.   - ADLs: Independent in ADLs and ambulation without assistive device.   - Hobbies: tv, reading, walking  - Work:  [email or web address] [email or web address]  - Smoking/Alcohol/Drugs: No/occasionally/No    Objective     Physical Exam:  General:  Appears state age, in NAD, and obese  Psychological:  Normal mood and affect  Pulm:  Breathing comfortably on RA    Gait:   - normal  - Without assistive device   - able to heel walk, able to toe walk    Inspection:   - No erythema,  swelling/edema, ecchymosis or deformity noted  - normal muscle bulk of bilateral upper extremity     Sensation:  - Intact to light touch in bilateral upper extremity    Palpation:  -  tenderness to palpation of bilateral trapezius and rhombroids  -  tenderness to palpation of bilateral spinal paraspinals at the level of cervical paraspinal    Range of Motion:  - Full Cervico-thoracic  flexion/extension (painful)/rotation(b/l painful L>R)/sidebending (b/l painful (L>R)  - Full painless bilateral shoulders    Strength:  Side Shoulder Abduction  (C5) Elbow Flexion (C5) Elbow Extension (C7) Wrist Extension (C6) Finger Flexion (DIP)  (C8) Finger Abduction  (T1)   Right 5 5 5 5 5 5   Left 5 5 5 5 5 5     Reflexes:  Side Biceps (C5) Brachioradialis (C6)  Triceps (C7)   Right 2+ 2+ NT   Left 2+ 2+ NT     Special tests:  - Acute radiculopathy (Spurling test/ Bakody sign): neg b/l   - Cervical Myelopathy (Serna's test): neg b/l  - Carpal tunnel (Phalens, tinnels): pos at left wrist      Imaging and Other Studies:    CT CERVICAL SPINE WO IV CONTRAST; ;  4/2/2024 12:02 pm      INDICATION:  Signs/Symptoms:neck pain radiating down right arm.      COMPARISON:  None.      ACCESSION NUMBER(S):  [id number]      ORDERING CLINICIAN:  OSCAR LUNA      TECHNIQUE:  Contiguous axial CT sections are performed through the cervical spine  is supplemented with coronal and sagittal reformatted images.      FINDINGS:  There is straightening of the cervical lordosis and slight dextro  convexity of the cervical spine. There is minimal posterior  subluxation of C5 measuring 2 mm. The facet joints align normally.      The cervical vertebral body heights are maintained. The C1 ring is  intact. There is no evidence of acute cervical spine fracture. There  is no bone destruction or aggressive periosteal reaction. There is  small lobular lucency at the base of the dens anteriorly reaching 6  mm and lucency in the right C2 pedicle. There is no bony  expansion.  These findings are unchanged from 10/06/2021 favoring benign etiology.      There is lower cervical spondylosis with disc space narrowing and  small marginal osteophytes from C5 through C7. There is mild cervical  spondylosis at C4-5 with disc space narrowing.      The C1-2 relationship is within normal limits.      The C2-3 disc space level demonstrates mild facet arthrosis. There is  no central canal or neural foraminal stenosis.      The C3-4 disc space level demonstrates mild facet arthrosis greater  on the right. There is mild narrowing of the right neural foramen.  There is no central canal narrowing.      The C4-5 disc space level demonstrates moderate facet arthrosis on  the left and mild facet arthrosis on the right. There is mild bulging  disc and uncovertebral arthrosis with mild bilateral neural foraminal  narrowing, greater on the left.      The C5-6 disc space level demonstrates mild bilateral facet  arthrosis, greater on the left. There is bulging disc and marginal  osteophyte with uncovertebral arthrosis on the right. There is  moderate narrowing of the right neural foramen and mild narrowing of  the left neural foramen.      The C6-7 disc space level demonstrates mild facet arthrosis on the  right. There is bulging disc and marginal osteophyte with mild  uncovertebral arthrosis. There is mild narrowing of the right neural  foramen.      The C7-T1 disc space level demonstrates mild facet arthrosis on the  right. There is no central canal or neural foraminal stenosis.      The surrounding soft tissues are unremarkable. There is no  prevertebral soft tissue swelling or retropharyngeal air.      IMPRESSION:  Mild multilevel cervical spondylosis.      Straightening of cervical lordosis and mild dextro convexity of the  cervical spine.      No sign of acute fracture or subluxation. Mild multilevel bulging  disc with facet and uncovertebral arthrosis. There is mild to  moderate multilevel  bilateral neural foraminal narrowing as detailed  above. There is no significant central canal stenosis.           [1]   Current Outpatient Medications:     albuterol (Ventolin HFA) 90 mcg/actuation inhaler, Inhale 2 puffs every 4 hours if needed for wheezing or shortness of breath., Disp: 8 g, Rfl: 5    albuterol 90 mcg/actuation inhaler, Inhale 2 puffs every 4 hours if needed for wheezing., Disp: 18 g, Rfl: 0    ascorbic acid (Vitamin C) 1,000 mg tablet, Take 1 tablet (1,000 mg) by mouth once daily., Disp: , Rfl:     aspirin 81 mg EC tablet, Take 1 tablet (81 mg) by mouth once daily., Disp: , Rfl:     blood pressure monitor kit, Check BP daily, Disp: 1 kit, Rfl: 0    cholecalciferol (Vitamin D-3) 50 MCG (2000 UT) tablet, Take by mouth., Disp: , Rfl:     docusate sodium (Colace) 100 mg capsule, Take 1 capsule (100 mg) by mouth 2 times a day., Disp: 180 capsule, Rfl: 3    escitalopram (Lexapro) 5 mg tablet, TAKE ONE TABLET BY MOUTH ONCE DAILY, Disp: 90 tablet, Rfl: 0    ezetimibe (Zetia) 10 mg tablet, Take 1 tablet (10 mg) by mouth once daily., Disp: 90 tablet, Rfl: 1    famotidine (Pepcid) 40 mg tablet, Take 1 tablet (40 mg) by mouth 2 times a day., Disp: 60 tablet, Rfl: 5    fluticasone (Flonase) 50 mcg/actuation nasal spray, Administer 2 sprays into each nostril once daily., Disp: 16 g, Rfl: 11    gabapentin (Neurontin) 300 mg capsule, Take 1 capsule (300 mg) by mouth 3 times a day., Disp: 90 capsule, Rfl: 5    hydroCHLOROthiazide (HYDRODiuril) 25 mg tablet, TAKE ONE TABLET BY MOUTH ONCE DAILY, Disp: 90 tablet, Rfl: 0    lancets 33 gauge misc, Use As Directed twice daily, Disp: , Rfl:     lidocaine (Xylocaine) 5 % ointment, , Disp: , Rfl:     losartan (Cozaar) 100 mg tablet, TAKE ONE TABLET BY MOUTH ONCE DAILY, Disp: 90 tablet, Rfl: 0    metoprolol tartrate (Lopressor) 25 mg tablet, TAKE 1 TABLET BY MOUTH TWICE A DAY, Disp: 180 tablet, Rfl: 0    OneTouch Ultra Test strip, USE 1 STRIP TO TEST ONCE DAILY, Disp: 100  strip, Rfl: 0    Ozempic 2 mg/dose (8 mg/3 mL) pen injector, Inject 2 mg under the skin 1 (one) time per week., Disp: , Rfl:     potassium chloride CR 10 mEq ER tablet, TAKE ONE TABLET BY MOUTH ONCE DAILY, Disp: 90 tablet, Rfl: 1    psyllium husk, aspartame, (Metamucil Sugar-Free, aspart,) 3.4 gram/5.8 gram powder, 2 tsp daily in 8 ounces of liquid, Disp: 1040 g, Rfl: 3    rosuvastatin (Crestor) 20 mg tablet, Take 1 tablet (20 mg) by mouth once daily., Disp: 90 tablet, Rfl: 3    vitamin-B complex split tablet, Take 1 tablet (2 half tablet) by mouth once daily., Disp: , Rfl:   [2]   Allergies  Allergen Reactions    Codeine Hives, Itching and Rash    Ace Inhibitors Swelling and Other    Atorvastatin Other    Penicillin Unknown    Sulfa (Sulfonamide Antibiotics) Other and Unknown   [3]   Past Surgical History:  Procedure Laterality Date     SECTION, LOW TRANSVERSE      SALPINGOOPHORECTOMY Left    [4]   Past Medical History:  Diagnosis Date    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    Carotidynia    Other specified soft tissue disorders 09/15/2021    Calf swelling    Personal history of diseases of the blood and blood-forming organs and certain disorders involving the immune mechanism 2021    History of anemia    Personal history of other diseases of the circulatory system 2021    History of intermittent claudication    Personal history of other diseases of the respiratory system 2022    History of chronic obstructive lung disease    Personal history of other specified conditions 2022    History of dizziness    Shortness of breath 2021    SOB (shortness of breath) on exertion

## 2025-06-17 NOTE — TELEPHONE ENCOUNTER
Called patient to notify her that her ozempic came in. Patient states she will come in tomorrow 6/18/25.

## 2025-06-24 ENCOUNTER — APPOINTMENT (OUTPATIENT)
Dept: NEUROLOGY | Facility: CLINIC | Age: 75
End: 2025-06-24
Payer: MEDICARE

## 2025-07-01 ENCOUNTER — APPOINTMENT (OUTPATIENT)
Dept: ORTHOPEDIC SURGERY | Facility: CLINIC | Age: 75
End: 2025-07-01
Payer: MEDICARE

## 2025-07-01 ENCOUNTER — HOSPITAL ENCOUNTER (OUTPATIENT)
Dept: RADIOLOGY | Facility: EXTERNAL LOCATION | Age: 75
Discharge: HOME | End: 2025-07-01

## 2025-07-01 DIAGNOSIS — M16.12 ARTHRITIS OF LEFT HIP: ICD-10-CM

## 2025-07-01 PROCEDURE — 3048F LDL-C <100 MG/DL: CPT | Performed by: FAMILY MEDICINE

## 2025-07-01 PROCEDURE — 99214 OFFICE O/P EST MOD 30 MIN: CPT | Performed by: FAMILY MEDICINE

## 2025-07-01 PROCEDURE — 3044F HG A1C LEVEL LT 7.0%: CPT | Performed by: FAMILY MEDICINE

## 2025-07-01 PROCEDURE — 1036F TOBACCO NON-USER: CPT | Performed by: FAMILY MEDICINE

## 2025-07-01 PROCEDURE — 4010F ACE/ARB THERAPY RXD/TAKEN: CPT | Performed by: FAMILY MEDICINE

## 2025-07-01 PROCEDURE — 1159F MED LIST DOCD IN RCRD: CPT | Performed by: FAMILY MEDICINE

## 2025-07-01 PROCEDURE — 20611 DRAIN/INJ JOINT/BURSA W/US: CPT | Performed by: FAMILY MEDICINE

## 2025-07-01 RX ORDER — LIDOCAINE HYDROCHLORIDE 10 MG/ML
4 INJECTION, SOLUTION INFILTRATION; PERINEURAL
Status: COMPLETED | OUTPATIENT
Start: 2025-07-01 | End: 2025-07-01

## 2025-07-01 RX ORDER — TRIAMCINOLONE ACETONIDE 40 MG/ML
80 INJECTION, SUSPENSION INTRA-ARTICULAR; INTRAMUSCULAR
Status: COMPLETED | OUTPATIENT
Start: 2025-07-01 | End: 2025-07-01

## 2025-07-01 RX ADMIN — TRIAMCINOLONE ACETONIDE 80 MG: 40 INJECTION, SUSPENSION INTRA-ARTICULAR; INTRAMUSCULAR at 08:15

## 2025-07-01 RX ADMIN — LIDOCAINE HYDROCHLORIDE 4 ML: 10 INJECTION, SOLUTION INFILTRATION; PERINEURAL at 08:15

## 2025-07-01 NOTE — PROGRESS NOTES
** Please excuse any errors in grammar or translation related to this dictation. Voice recognition software was utilized to prepare this document. **    Assessment & Plan:  Patient here for ongoing management of left hip arthritis.  While CSI completed in April did provide 2 months of pain relief, overall feels symptoms are progressing.  Non-operative treatment options reviewed below.  - Maintaining a healthy weight: Every pound of bodyweight is about 4-5 pounds through the lower extremity.  Patient has lost weight in the last few months with Ozempic.  - Exercise program to strengthen supportive musculature.    - Activity modifications as needed to include use of cane/walker or braces.  - Steroid injection.  Patient elected to repeat today.  Informed patient it can be repeated again in 3 months.  However it cannot be performed within 90 days of a planned hip replacement.  Caution risk of transient hyperglycemia due to underlying diabetes, not currently on insulin.  -  Patient has an appointment with Dr. Patricio Galarza tomorrow for arthroplasty consult.   Return precautions given.  Patient continue to follow-up as needed for nonoperative management.  All questions answered and patient is agreeable to this plan.       Chief complaint: L hip pain    HPI:  7/1/25:  Patient follows up for left hip pain.  She reports the injection from 4/1 provided about 2 months relief.  Denies any new injuries.  Overall feels increasing pain when the injection wears off.  Pain radiates from her hip to her knee.  No paresthesias or numbness reported.    4/1/25: Patient reports the injection from 12/31 provided about 2.5 months relief.  Denies any new injuries.  Tylenol as needed gives her a small amount of relief.  Overall feels symptoms are progressing.    12/31/24:  Patient reports the injection from 9/24 provided close to 3 months relief.  Denies any new injuries.  She has pain in the hip 1-2 times per week.     9/24/24:  Following up  with L hip pain secondary to OA.  Patient reports injection from 6/18 provided intermittent relief up until now.  Pain was never very severe.  Denies any new injuries. Tylenol, ibuprofen, and gabapentin as needed.     6/18/24: Patient reports injection from 3/12 provided relief up until the last week.  Denies any new injuries.  Takes tylenol or ibuprofen as needed.     3/12/24:  Patient reports injection on 12/12/23 provided 3 months of relief. She only started having pain the last few days.  Denies any new injuries. Notes that she is using Ozempic for weight loss and that this has helped lessen the severity of her symptoms.  Will take Tylenol or naproxen intermittently for breakthrough symptoms.     12/12/23:  Patient reports injection on 9/12 provided close to the 3 months relief. Occasional use of Tylenol if needed for breakthrough pain but this is infrequent.   Denies any new injuries.    9/12/23: F/u for left hip arthritis management. Patient reports injection on 6/15 provided close to 3 months relief. No reported progression in symptoms. She is requesting another injection.      6/15/23: Follow-up for left hip pain secondary to arthritis. Reports that steroid injection helped provide pain relief for nearly 3 months. Physical therapy has also been helping with her strengthening. States her therapist told her her range of motion of her hip is also been improving.      3/15/23: 71 y/o F, hx of obesity, DM and HTN, presents with left hip pain. Pain has been intermittently occurring for the past 4-5 years. Most recent pain has been continuous for 4 weeks. Pain is present in lateral hip and into groin. Pain has reduced her ROM. Pain increases with activity along with quick changes in weather temperature. Has been managing with otc analgesics Tylenol and Aleve which do not help at all. No previous injections or therapy for hip.      Patient Active Problem List   Diagnosis    Allergic rhinitis    Anxiety and  depression    Arthritis of left hip    Diabetic peripheral neuropathy (Multi)    Elevated coronary artery calcium score    Fatty liver    Hearing loss    History of herpes genitalis    Hyperlipidemia    Hypertension    Mild cognitive impairment    Multiple lung nodules on CT    EMERY on CPAP    Vitamin D deficiency    Enlarged lymph node in neck    Abnormal otoacoustic emissions test    Acquired hammertoes of both feet    Diffusion capacity of lung (dl), decreased    Hair thinning    Type 2 diabetes mellitus with hyperglycemia (Multi)    Constipation    Pulmonary emphysema (Multi)    Gastroesophageal reflux disease without esophagitis    Cervical radiculopathy    Arthritis    Class 1 obesity due to excess calories with serious comorbidity and body mass index (BMI) of 30.0 to 30.9 in adult     Past Surgical History:   Procedure Laterality Date     SECTION, LOW TRANSVERSE      SALPINGOOPHORECTOMY Left      Current Outpatient Medications on File Prior to Visit   Medication Sig Dispense Refill    albuterol (Ventolin HFA) 90 mcg/actuation inhaler Inhale 2 puffs every 4 hours if needed for wheezing or shortness of breath. 8 g 5    albuterol 90 mcg/actuation inhaler Inhale 2 puffs every 4 hours if needed for wheezing. 18 g 0    ascorbic acid (Vitamin C) 1,000 mg tablet Take 1 tablet (1,000 mg) by mouth once daily.      aspirin 81 mg EC tablet Take 1 tablet (81 mg) by mouth once daily.      blood pressure monitor kit Check BP daily 1 kit 0    cholecalciferol (Vitamin D-3) 50 MCG (2000 UT) tablet Take by mouth.      docusate sodium (Colace) 100 mg capsule Take 1 capsule (100 mg) by mouth 2 times a day. 180 capsule 3    escitalopram (Lexapro) 5 mg tablet TAKE ONE TABLET BY MOUTH ONCE DAILY 90 tablet 0    ezetimibe (Zetia) 10 mg tablet Take 1 tablet (10 mg) by mouth once daily. 90 tablet 1    famotidine (Pepcid) 40 mg tablet Take 1 tablet (40 mg) by mouth 2 times a day. 60 tablet 5    fluticasone (Flonase) 50  mcg/actuation nasal spray Administer 2 sprays into each nostril once daily. 16 g 11    gabapentin (Neurontin) 300 mg capsule Take 1 capsule (300 mg) by mouth 3 times a day. 90 capsule 5    hydroCHLOROthiazide (HYDRODiuril) 25 mg tablet TAKE ONE TABLET BY MOUTH ONCE DAILY 90 tablet 0    lancets 33 gauge misc Use As Directed twice daily      lidocaine (Xylocaine) 5 % ointment       losartan (Cozaar) 100 mg tablet TAKE ONE TABLET BY MOUTH ONCE DAILY 90 tablet 0    meloxicam (Mobic) 15 mg tablet Take 1 tablet (15 mg) by mouth once daily as needed for moderate pain (4 - 6). Start after finishing medrol dose pack. Take 14 days daily followed by as needed 30 tablet 1    methocarbamol (Robaxin) 500 mg tablet Take 1 tablet (500 mg) by mouth 3 times a day as needed for muscle spasms. 90 tablet 1    metoprolol tartrate (Lopressor) 25 mg tablet TAKE 1 TABLET BY MOUTH TWICE A  tablet 0    OneTouch Ultra Test strip USE 1 STRIP TO TEST ONCE DAILY 100 strip 0    Ozempic 2 mg/dose (8 mg/3 mL) pen injector Inject 2 mg under the skin 1 (one) time per week.      potassium chloride CR 10 mEq ER tablet TAKE ONE TABLET BY MOUTH ONCE DAILY 90 tablet 1    psyllium husk, aspartame, (Metamucil Sugar-Free, aspart,) 3.4 gram/5.8 gram powder 2 tsp daily in 8 ounces of liquid 1040 g 3    rosuvastatin (Crestor) 20 mg tablet Take 1 tablet (20 mg) by mouth once daily. 90 tablet 3    vitamin-B complex split tablet Take 1 tablet (2 half tablet) by mouth once daily.       No current facility-administered medications on file prior to visit.       Exam:    General Exam:  Constitutional - NAD, AAO x 3, conversing appropriately.  HEENT- Normocephalic and atraumatic. EOMI, PERRLA, No scleral icterus. No facial deformities. Hearing grossly normal.  Lungs - Breathing non-labored with normal rate. No accessory muscle use.  CV - Extremities warm and well-perfused, brisk capillary refill present.   Neuro - CN II-XII grossly intact.    LEFT Hip Exam:  No  warmth, erythema or ecchymosis overlying.  Active flexion >90 degrees. ER 40deg, IR 20deg.  NTTP over greater trochanter, glute tendons, proximal ITB  [5]/5 strength of hip flexion, abduction, & adduction  SILT  [ - ]Log roll pain, [ + ]FADIR, [ - ]JOVANA, [ +]Stinchfield      Results:  Xrays of left hip obtained 4/1/2025  demonstrates moderate to advanced degenerative changes with joint space narrowing, osteophyte formation, and subchondral sclerosis.  Slight progression in disease when compared to x-rays from 2023.    Lab Results   Component Value Date    HGBA1C 5.5 05/01/2025    HGBA1C 5.6 12/03/2024    HGBA1C 6.0 07/29/2024    CREATININE 0.57 (L) 05/01/2025    EGFR >90 05/01/2025     Procedure:  Patient ID: Carolina Martinez is a 74 y.o. female.    L Inj/Asp: L hip joint on 7/1/2025 8:15 AM  Indications: pain  Details: 22 G (spinal) needle, ultrasound-guided anterior approach  Medications: 80 mg triamcinolone acetonide 40 mg/mL; 4 mL lidocaine 10 mg/mL (1 %)  Outcome: tolerated well, no immediate complications    Procedure risk factors to include increased pain, infection, bleeding, neurovascular injury particularly given close proximity to femoral vessels, soft tissue injury, progressive cartilage loss, transient elevation of blood glucose and blood pressure, and adverse reaction to medication were discussed with the patient. Patient understands there is a moderate risk of morbidity from undergoing the procedure.      Procedure, treatment alternatives, risks and benefits explained, specific risks discussed. Consent was given by the patient. Immediately prior to procedure a time out was called to verify the correct patient, procedure, equipment, support staff and site/side marked as required. Patient was prepped and draped in the usual sterile fashion.

## 2025-07-02 ENCOUNTER — APPOINTMENT (OUTPATIENT)
Dept: ORTHOPEDIC SURGERY | Facility: CLINIC | Age: 75
End: 2025-07-02
Payer: MEDICARE

## 2025-07-02 DIAGNOSIS — M25.552 GREATER TROCHANTERIC PAIN SYNDROME OF LEFT LOWER EXTREMITY: Primary | ICD-10-CM

## 2025-07-02 DIAGNOSIS — M16.12 PRIMARY OSTEOARTHRITIS OF LEFT HIP: ICD-10-CM

## 2025-07-02 PROCEDURE — 4010F ACE/ARB THERAPY RXD/TAKEN: CPT | Performed by: STUDENT IN AN ORGANIZED HEALTH CARE EDUCATION/TRAINING PROGRAM

## 2025-07-02 PROCEDURE — 3044F HG A1C LEVEL LT 7.0%: CPT | Performed by: STUDENT IN AN ORGANIZED HEALTH CARE EDUCATION/TRAINING PROGRAM

## 2025-07-02 PROCEDURE — 1159F MED LIST DOCD IN RCRD: CPT | Performed by: STUDENT IN AN ORGANIZED HEALTH CARE EDUCATION/TRAINING PROGRAM

## 2025-07-02 PROCEDURE — 3048F LDL-C <100 MG/DL: CPT | Performed by: STUDENT IN AN ORGANIZED HEALTH CARE EDUCATION/TRAINING PROGRAM

## 2025-07-02 PROCEDURE — 99214 OFFICE O/P EST MOD 30 MIN: CPT | Performed by: STUDENT IN AN ORGANIZED HEALTH CARE EDUCATION/TRAINING PROGRAM

## 2025-07-02 NOTE — PROGRESS NOTES
PRIMARY CARE PHYSICIAN: Ashtyn Capps MD  REFERRING PROVIDER: No referring provider defined for this encounter.       SUBJECTIVE  CHIEF COMPLAINT:   Chief Complaint   Patient presents with    Left Hip - New Patient Visit, Pain        HPI: Carolina Martinez is a pleasant 74 y.o. year-old female who is seen today for evaluation of left hip pain. The pain has been present for 4-5 years. The onset of pain was not associated with a traumatic injury.  Carolina Martinez states that the pain is located in the lateral and anterior aspect of the L hip with intermittent radiation to the knee.  Carolina Martinez denies any history of inflammatory arthritis.  The pain is aggravated by prolonged ambulation and alleviated by rest, oral medications. OF note, the patient has had physical therapy and multiple rounds of CSI to the left hip from Dr. Lester, most recently on 7/1, with progressively diminishing relief. She notes that her last injection in April of this year gave minimal relief.     FUNCTIONAL STATUS: occasionally limited.  AMBULATORY STATUS: Occasional use of rollator walker with episodes of prolonged ambulation  PREVIOUS TREATMENTS: physical therapy, activity modification, over the counter medications, prescription medication, and corticosteroid injections   HISTORY OF SURGERY ON AFFECTED HIP(S): No   BACK PAIN REPORTED: Yes   SYMPTOMS INTERFERING WITH SLEEP: Yes   INSTABILITY: No   IMPACTING QUALITY OF LIFE: Yes     REVIEW OF SYSTEMS  The patient denies any fever, chills, chest pain, shortness of breath or difficulty breathing.  Patient denies any numbness, tingling, or radicular symptoms.  Adult patient history sheet was filled out by the patient today in clinic and will be scanned into the EMR.  I personally reviewed this form which will be scanned into the EMR.  This includes Past Medical History, Past Surgical History, Medications, Allergies, Social History, Family History and 12 point review of systems.    Medical  History[1]     Allergies[2]     Surgical History[3]     Family History[4]     Social History     Socioeconomic History    Marital status:      Spouse name: Not on file    Number of children: 2    Years of education: Not on file    Highest education level: Not on file   Occupational History    Not on file   Tobacco Use    Smoking status: Former     Current packs/day: 0.00     Average packs/day: 1 pack/day for 33.0 years (33.0 ttl pk-yrs)     Types: Cigarettes     Start date:      Quit date:      Years since quittin.5    Smokeless tobacco: Never   Vaping Use    Vaping status: Never Used   Substance and Sexual Activity    Alcohol use: Yes     Alcohol/week: 1.0 standard drink of alcohol     Types: 1 Glasses of wine per week     Comment: social    Drug use: Never    Sexual activity: Not Currently     Partners: Male     Birth control/protection: Post-menopausal   Other Topics Concern    Not on file   Social History Narrative    Not on file     Social Drivers of Health     Financial Resource Strain: Low Risk  (2025)    Overall Financial Resource Strain (CARDIA)     Difficulty of Paying Living Expenses: Not hard at all   Food Insecurity: No Food Insecurity (2024)    Hunger Vital Sign     Worried About Running Out of Food in the Last Year: Never true     Ran Out of Food in the Last Year: Never true   Transportation Needs: No Transportation Needs (2025)    PRAPARE - Transportation     Lack of Transportation (Medical): No     Lack of Transportation (Non-Medical): No   Physical Activity: Not on file   Stress: Not on file   Social Connections: Not on file   Intimate Partner Violence: Not At Risk (2025)    Humiliation, Afraid, Rape, and Kick questionnaire     Fear of Current or Ex-Partner: No     Emotionally Abused: No     Physically Abused: No     Sexually Abused: No   Housing Stability: Low Risk  (2025)    Housing Stability Vital Sign     Unable to Pay for Housing in the Last Year:  No     Number of Times Moved in the Last Year: 0     Homeless in the Last Year: No        CURRENT MEDICATIONS:   Current Medications[5]     OBJECTIVE    PHYSICAL EXAM  There is no height or weight on file to calculate BMI.    General: Well-appearing female in no acute distress.  Awake, alert and oriented.  Pleasant and cooperative.  Respiratory: Non-labored breathing  Mood: Euthymic   Gait: normal  Assistive Device: None     Limb Length Discrepancy: none    Affected Left Hip  Range of motion:   Flexion: 110  Extension: 0  Internal Rotation: 15  External Rotation: 40  Abduction: 30  Hip Flexor Strength: 5/5  Abductor Strength: 5/5  Adductor Strength: 5/5  Tenderness: TTP over the greater trochanter  Sensation: Intact to light touch distally  Motor function: Able to fire TA, EHL, G/S  Pulses: Palpable DP pulse    IMAGING:  AP pelvis, AP hip and false profile views: Independent review of left hip and pelvis x-rays was performed. The findings were reviewed with the patient. There are moderate-to-severe degenerative changes of the left hip with associated joint space narrowing, subchondral sclerosis, and osteophyte formation. No evidence of fracture, AVN, dislocation, osteomyelitis.    ASSESSMENT & PLAN    IMPRESSION:  Carolina Martinez is a 74 y.o. female presenting with moderate-to-severe OA of the left hip. Patient has had ongoing pain for years, previously managed with conservative measures including PT, oral medications, multiple CSI to the left hip with diminishing relief. She does also have a prominent component of lateral hip pain with tenderness over the GT.     PLAN:  - We will obtain an MRI of the left hip to assess for GT bursitis/inflammation.   - If MRI is positive for the above, the patient will follow-up for diagnostic/therapeutic GT bursa injection to better elucidate if her current pain is more related to her GT or IA hip pain.   - All questions answered, patient in agreement with above plan.      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    Carotidynia    Other specified soft tissue disorders 09/15/2021    Calf swelling    Personal history of diseases of the blood and blood-forming organs and certain disorders involving the immune mechanism 2021    History of anemia    Personal history of other diseases of the circulatory system 2021    History of intermittent claudication    Personal history of other diseases of the respiratory system 2022    History of chronic obstructive lung disease    Personal history of other specified conditions 2022    History of dizziness    Shortness of breath 2021    SOB (shortness of breath) on exertion   [2]   Allergies  Allergen Reactions    Codeine Hives, Itching and Rash    Ace Inhibitors Swelling and Other    Atorvastatin Other    Penicillin Unknown    Sulfa (Sulfonamide Antibiotics) Other and Unknown   [3]   Past Surgical History:  Procedure Laterality Date     SECTION, LOW TRANSVERSE      SALPINGOOPHORECTOMY Left    [4]   Family History  Problem Relation Name Age of Onset    Heart disease Mother      Heart disease Father      Coronary artery disease Sister          Stent patent    Lung cancer Sister      Coronary artery disease Brother      Heart attack Brother      Suicide Attempts Daughter      Breast cancer Father's Sister  85   [5]   Current Outpatient Medications   Medication Sig Dispense Refill    albuterol (Ventolin HFA) 90 mcg/actuation inhaler Inhale 2 puffs every 4 hours if needed for wheezing or shortness of breath. 8 g 5    albuterol 90 mcg/actuation inhaler Inhale 2 puffs every 4 hours if needed for wheezing. 18 g 0    ascorbic acid (Vitamin C) 1,000 mg tablet Take 1 tablet (1,000 mg) by mouth once daily.      aspirin 81 mg EC tablet Take 1 tablet (81 mg) by mouth once daily.       blood pressure monitor kit Check BP daily 1 kit 0    cholecalciferol (Vitamin D-3) 50 MCG (2000 UT) tablet Take by mouth.      docusate sodium (Colace) 100 mg capsule Take 1 capsule (100 mg) by mouth 2 times a day. 180 capsule 3    escitalopram (Lexapro) 5 mg tablet TAKE ONE TABLET BY MOUTH ONCE DAILY 90 tablet 0    ezetimibe (Zetia) 10 mg tablet Take 1 tablet (10 mg) by mouth once daily. 90 tablet 1    famotidine (Pepcid) 40 mg tablet Take 1 tablet (40 mg) by mouth 2 times a day. 60 tablet 5    fluticasone (Flonase) 50 mcg/actuation nasal spray Administer 2 sprays into each nostril once daily. 16 g 11    gabapentin (Neurontin) 300 mg capsule Take 1 capsule (300 mg) by mouth 3 times a day. 90 capsule 5    hydroCHLOROthiazide (HYDRODiuril) 25 mg tablet TAKE ONE TABLET BY MOUTH ONCE DAILY 90 tablet 0    lancets 33 gauge misc Use As Directed twice daily      lidocaine (Xylocaine) 5 % ointment       losartan (Cozaar) 100 mg tablet TAKE ONE TABLET BY MOUTH ONCE DAILY 90 tablet 0    meloxicam (Mobic) 15 mg tablet Take 1 tablet (15 mg) by mouth once daily as needed for moderate pain (4 - 6). Start after finishing medrol dose pack. Take 14 days daily followed by as needed 30 tablet 1    methocarbamol (Robaxin) 500 mg tablet Take 1 tablet (500 mg) by mouth 3 times a day as needed for muscle spasms. 90 tablet 1    metoprolol tartrate (Lopressor) 25 mg tablet TAKE 1 TABLET BY MOUTH TWICE A  tablet 0    OneTouch Ultra Test strip USE 1 STRIP TO TEST ONCE DAILY 100 strip 0    Ozempic 2 mg/dose (8 mg/3 mL) pen injector Inject 2 mg under the skin 1 (one) time per week.      potassium chloride CR 10 mEq ER tablet TAKE ONE TABLET BY MOUTH ONCE DAILY 90 tablet 1    psyllium husk, aspartame, (Metamucil Sugar-Free, aspart,) 3.4 gram/5.8 gram powder 2 tsp daily in 8 ounces of liquid 1040 g 3    rosuvastatin (Crestor) 20 mg tablet Take 1 tablet (20 mg) by mouth once daily. 90 tablet 3    vitamin-B complex split tablet Take 1  tablet (2 half tablet) by mouth once daily.       No current facility-administered medications for this visit.

## 2025-07-15 ENCOUNTER — HOSPITAL ENCOUNTER (OUTPATIENT)
Dept: RADIOLOGY | Facility: CLINIC | Age: 75
Discharge: HOME | End: 2025-07-15
Payer: MEDICARE

## 2025-07-15 ENCOUNTER — APPOINTMENT (OUTPATIENT)
Dept: ORTHOPEDIC SURGERY | Facility: CLINIC | Age: 75
End: 2025-07-15
Payer: MEDICARE

## 2025-07-15 DIAGNOSIS — M16.12 PRIMARY OSTEOARTHRITIS OF LEFT HIP: ICD-10-CM

## 2025-07-15 PROCEDURE — 73721 MRI JNT OF LWR EXTRE W/O DYE: CPT | Mod: LT

## 2025-07-16 DIAGNOSIS — F32.A ANXIETY AND DEPRESSION: ICD-10-CM

## 2025-07-16 DIAGNOSIS — F41.9 ANXIETY AND DEPRESSION: ICD-10-CM

## 2025-07-17 RX ORDER — ESCITALOPRAM OXALATE 5 MG/1
5 TABLET ORAL DAILY
Qty: 90 TABLET | Refills: 0 | Status: SHIPPED | OUTPATIENT
Start: 2025-07-17

## 2025-07-21 ENCOUNTER — APPOINTMENT (OUTPATIENT)
Dept: ORTHOPEDIC SURGERY | Facility: CLINIC | Age: 75
End: 2025-07-21
Payer: MEDICARE

## 2025-07-23 ENCOUNTER — APPOINTMENT (OUTPATIENT)
Dept: ORTHOPEDIC SURGERY | Facility: CLINIC | Age: 75
End: 2025-07-23
Payer: MEDICARE

## 2025-07-23 DIAGNOSIS — M16.12 PRIMARY LOCALIZED OSTEOARTHRITIS OF LEFT HIP: Primary | ICD-10-CM

## 2025-07-23 PROCEDURE — 1159F MED LIST DOCD IN RCRD: CPT | Performed by: STUDENT IN AN ORGANIZED HEALTH CARE EDUCATION/TRAINING PROGRAM

## 2025-07-23 PROCEDURE — 4010F ACE/ARB THERAPY RXD/TAKEN: CPT | Performed by: STUDENT IN AN ORGANIZED HEALTH CARE EDUCATION/TRAINING PROGRAM

## 2025-07-23 PROCEDURE — 99214 OFFICE O/P EST MOD 30 MIN: CPT | Performed by: STUDENT IN AN ORGANIZED HEALTH CARE EDUCATION/TRAINING PROGRAM

## 2025-07-23 PROCEDURE — G2211 COMPLEX E/M VISIT ADD ON: HCPCS | Performed by: STUDENT IN AN ORGANIZED HEALTH CARE EDUCATION/TRAINING PROGRAM

## 2025-07-23 NOTE — PROGRESS NOTES
PRIMARY CARE PHYSICIAN: Ashtyn Capps MD  REFERRING PROVIDER: No referring provider defined for this encounter.     SUBJECTIVE  CHIEF COMPLAINT:   Chief Complaint   Patient presents with    Left Hip - Results      HPI: Carolina Martinez is a pleasant 74 y.o. year-old female who is seen today for evaluation of left hip pain. The pain has been present for 4-5 years. The onset of pain was not associated with a traumatic injury.  Carolina Martinez states that the pain is located in the lateral and anterior aspect of the L hip with intermittent radiation to the knee.  Carolina Martinez denies any history of inflammatory arthritis.  The pain is aggravated by prolonged ambulation and alleviated by rest, oral medications. Of note, the patient has had physical therapy and multiple rounds of CSI to the left hip from Dr. Lester, most recently on 7/1, with progressively diminishing relief. She notes that her last injection in April of this year gave minimal relief.     Presents today for review of MRI recommended at her last visit on 7/2. Reports no change in the above symptoms.     FUNCTIONAL STATUS: occasionally limited.  AMBULATORY STATUS: Occasional use of rollator walker with episodes of prolonged ambulation  PREVIOUS TREATMENTS: physical therapy, activity modification, over the counter medications, prescription medication, and corticosteroid injections   HISTORY OF SURGERY ON AFFECTED HIP(S): No   BACK PAIN REPORTED: Yes   SYMPTOMS INTERFERING WITH SLEEP: Yes   INSTABILITY: No   IMPACTING QUALITY OF LIFE: Yes     REVIEW OF SYSTEMS  The patient denies any fever, chills, chest pain, shortness of breath or difficulty breathing.  Patient denies any numbness, tingling, or radicular symptoms.  Adult patient history sheet was filled out by the patient today in clinic and will be scanned into the EMR.  I personally reviewed this form which will be scanned into the EMR.  This includes Past Medical History, Past Surgical History,  Medications, Allergies, Social History, Family History and 12 point review of systems.    Medical History[1]     Allergies[2]     Surgical History[3]     Family History[4]     Social History     Socioeconomic History    Marital status:      Spouse name: Not on file    Number of children: 2    Years of education: Not on file    Highest education level: Not on file   Occupational History    Not on file   Tobacco Use    Smoking status: Former     Current packs/day: 0.00     Average packs/day: 1 pack/day for 33.0 years (33.0 ttl pk-yrs)     Types: Cigarettes     Start date:      Quit date: 2006     Years since quittin.5    Smokeless tobacco: Never   Vaping Use    Vaping status: Never Used   Substance and Sexual Activity    Alcohol use: Yes     Alcohol/week: 1.0 standard drink of alcohol     Types: 1 Glasses of wine per week     Comment: social    Drug use: Never    Sexual activity: Not Currently     Partners: Male     Birth control/protection: Post-menopausal   Other Topics Concern    Not on file   Social History Narrative    Not on file     Social Drivers of Health     Financial Resource Strain: Low Risk  (2025)    Overall Financial Resource Strain (CARDIA)     Difficulty of Paying Living Expenses: Not hard at all   Food Insecurity: No Food Insecurity (2024)    Hunger Vital Sign     Worried About Running Out of Food in the Last Year: Never true     Ran Out of Food in the Last Year: Never true   Transportation Needs: No Transportation Needs (2025)    PRAPARE - Transportation     Lack of Transportation (Medical): No     Lack of Transportation (Non-Medical): No   Physical Activity: Not on file   Stress: Not on file   Social Connections: Not on file   Intimate Partner Violence: Not At Risk (2025)    Humiliation, Afraid, Rape, and Kick questionnaire     Fear of Current or Ex-Partner: No     Emotionally Abused: No     Physically Abused: No     Sexually Abused: No   Housing Stability: Low  Risk  (2/24/2025)    Housing Stability Vital Sign     Unable to Pay for Housing in the Last Year: No     Number of Times Moved in the Last Year: 0     Homeless in the Last Year: No        CURRENT MEDICATIONS:   Current Medications[5]     OBJECTIVE    PHYSICAL EXAM  There is no height or weight on file to calculate BMI.    General: Well-appearing female in no acute distress.  Awake, alert and oriented.  Pleasant and cooperative.  Respiratory: Non-labored breathing  Mood: Euthymic   Gait: normal  Assistive Device: None     Limb Length Discrepancy: none    Affected Left Hip  Range of motion:   Flexion: 110  Extension: 0  Internal Rotation: 15  External Rotation: 40  Abduction: 30  Hip Flexor Strength: 5/5  Abductor Strength: 5/5  Adductor Strength: 5/5  Tenderness: TTP over the greater trochanter  Sensation: Intact to light touch distally  Motor function: Able to fire TA, EHL, G/S  Pulses: Palpable DP pulse    IMAGING:  No new x-rays obtained today.  MRI of the left hip with evidence of cartilage loss and subchondral cysts consistent with advanced osteoarthritis of the left hip.  No significant component of greater trochanteric bursitis or abductor tendon pathology.     ASSESSMENT & PLAN    IMPRESSION:  Carolina Martinez is a 74 y.o. female presenting with moderate-to-severe OA of the left hip. Patient has had ongoing pain for years, previously managed with conservative measures including PT, oral medications, multiple CSI to the left hip with diminishing relief. She does also have a prominent component of lateral hip pain with tenderness over the GT.     Presents today for review of MRI ordered at her last visit on 7/2/2025.  MRI consistent with history of advanced left hip osteoarthritis and no significant evidence of greater trochanteric bursitis or abductor tendon pathology.    PLAN:  Carolina Martinez for more than six months has had limited function as well as persistent and severe pain which has negatively  impacted the quality of life and interfered with activities of daily living. Under my care or the care of other providers, for greater than the three months, conservative treatment including activity modification, over the counter pain medications, physical therapy and/or recommended home exercise program, have provided only minimal relief. The patient has not had an intra-articular injection in the past 3 months. The option to continue with conservative measures in lieu of arthroplasty was discussed and offered. However, given the failure of these conservative measures and the clinical and radiographic evidence of end-stage arthritis, the patient is a good candidate for an elective total hip arthroplasty. The stated potential benefits include pain relief, a feeling of improved joint motion and improved function were discussed but no guarantees were offered.    Patient most recently had an intra-articular hip injection with Dr. Lester on 7/1/2025.  Accordingly, we will wait 4 months prior to scheduling surgery.    All questions answered, patient in agreement with above plan.     Giacomo Issa MD  Orthopaedic Surgery PGY2  [facility]  EPIC Chat Preferred      The patient was seen and examined with my resident physician Dr. Issa, who assisted with documentation. I saw and evaluated the patient. I personally obtained the key and critical portions of the history and physical exam. I reviewed the documentation and formulated the plan of care. I agree with the medical decision making as documented in the note.    Carolina Martinez returns to the office to follow-up her MRI results.  Her MRI does not demonstrate significant pathology in her gluteal tendons nor any evidence of significant trochanteric bursitis.  It does demonstrate advanced osteoarthritis with subchondral cyst formation and edema in the femoral head.    Accordingly, I think it would be reasonable at this juncture to move forward total  joint replacement.  Through shared decision making after discussions of the risks and benefits, the patient agreed.    We will move forward with total joint replacement.  CPT 92854.  DePuy Emphasys and Actis.  Overnight admission.    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    Carotidynia    Other specified soft tissue disorders 09/15/2021    Calf swelling    Personal history of diseases of the blood and blood-forming organs and certain disorders involving the immune mechanism 2021    History of anemia    Personal history of other diseases of the circulatory system 2021    History of intermittent claudication    Personal history of other diseases of the respiratory system 2022    History of chronic obstructive lung disease    Personal history of other specified conditions 2022    History of dizziness    Shortness of breath 2021    SOB (shortness of breath) on exertion   [2]   Allergies  Allergen Reactions    Codeine Hives, Itching and Rash    Ace Inhibitors Swelling and Other    Atorvastatin Other    Penicillin Unknown    Sulfa (Sulfonamide Antibiotics) Other and Unknown   [3]   Past Surgical History:  Procedure Laterality Date     SECTION, LOW TRANSVERSE      SALPINGOOPHORECTOMY Left    [4]   Family History  Problem Relation Name Age of Onset    Heart disease Mother      Heart disease Father      Coronary artery disease Sister          Stent patent    Lung cancer Sister      Coronary artery disease Brother      Heart attack Brother      Suicide Attempts Daughter      Breast cancer Father's Sister  85   [5]   Current Outpatient Medications   Medication Sig Dispense Refill    albuterol (Ventolin HFA) 90 mcg/actuation inhaler Inhale 2 puffs every 4 hours if needed for wheezing or shortness of breath. 8 g 5    albuterol  90 mcg/actuation inhaler Inhale 2 puffs every 4 hours if needed for wheezing. 18 g 0    ascorbic acid (Vitamin C) 1,000 mg tablet Take 1 tablet (1,000 mg) by mouth once daily.      aspirin 81 mg EC tablet Take 1 tablet (81 mg) by mouth once daily.      blood pressure monitor kit Check BP daily 1 kit 0    cholecalciferol (Vitamin D-3) 50 MCG (2000 UT) tablet Take by mouth.      docusate sodium (Colace) 100 mg capsule Take 1 capsule (100 mg) by mouth 2 times a day. 180 capsule 3    escitalopram (Lexapro) 5 mg tablet TAKE ONE TABLET BY MOUTH ONCE DAILY 90 tablet 0    ezetimibe (Zetia) 10 mg tablet Take 1 tablet (10 mg) by mouth once daily. 90 tablet 1    famotidine (Pepcid) 40 mg tablet Take 1 tablet (40 mg) by mouth 2 times a day. 60 tablet 5    fluticasone (Flonase) 50 mcg/actuation nasal spray Administer 2 sprays into each nostril once daily. 16 g 11    gabapentin (Neurontin) 300 mg capsule Take 1 capsule (300 mg) by mouth 3 times a day. 90 capsule 5    hydroCHLOROthiazide (HYDRODiuril) 25 mg tablet TAKE ONE TABLET BY MOUTH ONCE DAILY 90 tablet 0    lancets 33 gauge misc Use As Directed twice daily      lidocaine (Xylocaine) 5 % ointment       losartan (Cozaar) 100 mg tablet TAKE ONE TABLET BY MOUTH ONCE DAILY 90 tablet 0    meloxicam (Mobic) 15 mg tablet Take 1 tablet (15 mg) by mouth once daily as needed for moderate pain (4 - 6). Start after finishing medrol dose pack. Take 14 days daily followed by as needed 30 tablet 1    methocarbamol (Robaxin) 500 mg tablet Take 1 tablet (500 mg) by mouth 3 times a day as needed for muscle spasms. 90 tablet 1    metoprolol tartrate (Lopressor) 25 mg tablet TAKE 1 TABLET BY MOUTH TWICE A  tablet 0    OneTouch Ultra Test strip USE 1 STRIP TO TEST ONCE DAILY 100 strip 0    Ozempic 2 mg/dose (8 mg/3 mL) pen injector Inject 2 mg under the skin 1 (one) time per week.      potassium chloride CR 10 mEq ER tablet TAKE ONE TABLET BY MOUTH ONCE DAILY 90 tablet 1    psyllium husk,  aspartame, (Metamucil Sugar-Free, aspart,) 3.4 gram/5.8 gram powder 2 tsp daily in 8 ounces of liquid 1040 g 3    rosuvastatin (Crestor) 20 mg tablet Take 1 tablet (20 mg) by mouth once daily. 90 tablet 3    vitamin-B complex split tablet Take 1 tablet (2 half tablet) by mouth once daily.       No current facility-administered medications for this visit.

## 2025-07-28 ENCOUNTER — TELEPHONE (OUTPATIENT)
Dept: PRIMARY CARE | Facility: CLINIC | Age: 75
End: 2025-07-28
Payer: MEDICARE

## 2025-07-28 NOTE — TELEPHONE ENCOUNTER
Dr. Capps Pt. left a voicemail message stating to call her regarding her hip replacement with Dr. Patricio Galarza Pt. contact number is [phone].

## 2025-07-29 DIAGNOSIS — I10 HYPERTENSION, UNSPECIFIED TYPE: ICD-10-CM

## 2025-07-30 ENCOUNTER — TELEPHONE (OUTPATIENT)
Dept: PRIMARY CARE | Facility: CLINIC | Age: 75
End: 2025-07-30
Payer: MEDICARE

## 2025-07-30 RX ORDER — LOSARTAN POTASSIUM 100 MG/1
100 TABLET ORAL DAILY
Qty: 90 TABLET | Refills: 0 | Status: SHIPPED | OUTPATIENT
Start: 2025-07-30

## 2025-07-30 NOTE — TELEPHONE ENCOUNTER
Patient is requesting a call to discuss her concerns with her hip surgery she can be reached at [phone].

## 2025-08-05 ENCOUNTER — OFFICE VISIT (OUTPATIENT)
Dept: ORTHOPEDIC SURGERY | Facility: HOSPITAL | Age: 75
End: 2025-08-05
Payer: MEDICARE

## 2025-08-05 DIAGNOSIS — G56.02 CARPAL TUNNEL SYNDROME OF LEFT WRIST: ICD-10-CM

## 2025-08-05 DIAGNOSIS — R41.3 MEMORY CHANGES: Primary | ICD-10-CM

## 2025-08-05 DIAGNOSIS — M47.812 CERVICAL SPONDYLOSIS: ICD-10-CM

## 2025-08-05 PROCEDURE — 99212 OFFICE O/P EST SF 10 MIN: CPT | Performed by: STUDENT IN AN ORGANIZED HEALTH CARE EDUCATION/TRAINING PROGRAM

## 2025-08-05 PROCEDURE — 1159F MED LIST DOCD IN RCRD: CPT | Performed by: STUDENT IN AN ORGANIZED HEALTH CARE EDUCATION/TRAINING PROGRAM

## 2025-08-05 PROCEDURE — 99214 OFFICE O/P EST MOD 30 MIN: CPT | Performed by: STUDENT IN AN ORGANIZED HEALTH CARE EDUCATION/TRAINING PROGRAM

## 2025-08-05 PROCEDURE — 1125F AMNT PAIN NOTED PAIN PRSNT: CPT | Performed by: STUDENT IN AN ORGANIZED HEALTH CARE EDUCATION/TRAINING PROGRAM

## 2025-08-05 PROCEDURE — 4010F ACE/ARB THERAPY RXD/TAKEN: CPT | Performed by: STUDENT IN AN ORGANIZED HEALTH CARE EDUCATION/TRAINING PROGRAM

## 2025-08-05 RX ORDER — METHOCARBAMOL 500 MG/1
500 TABLET, FILM COATED ORAL DAILY PRN
Qty: 90 TABLET | Refills: 0 | Status: SHIPPED | OUTPATIENT
Start: 2025-08-05 | End: 2025-11-03

## 2025-08-05 RX ORDER — MELOXICAM 15 MG/1
15 TABLET ORAL DAILY PRN
Qty: 90 TABLET | Refills: 0 | Status: SHIPPED | OUTPATIENT
Start: 2025-08-05 | End: 2025-11-03

## 2025-08-05 ASSESSMENT — PAIN SCALES - GENERAL: PAINLEVEL_OUTOF10: 9

## 2025-08-05 ASSESSMENT — PAIN - FUNCTIONAL ASSESSMENT: PAIN_FUNCTIONAL_ASSESSMENT: 0-10

## 2025-08-05 ASSESSMENT — PAIN DESCRIPTION - DESCRIPTORS: DESCRIPTORS: ACHING;DISCOMFORT;PATIENT UNABLE TO DESCRIBE

## 2025-08-05 NOTE — PROGRESS NOTES
Assessment     Carolina is a 74 y.o. female with significant past medical history of DM2 (HbA1c: 5.5% (05/2025), obesity, EMERY, HTN, HLD, who presents with neck pain and left leg pain.  The patient's symptoms, clinical exam and imaging studies are suggestive of cervical spondylosis, left carpal tunnel syndrome.  The other possible differential diagnosis(es) include(s):  cervical radiculitis     Her symptoms have worsened since the last visit. Home exercise, patches and medication helped .    Injections Hx:    Date/Injection Type/Location/%relief/ Lasting  - 07/1/25, 04/01/25, 12/31/24, 09/24/24, 06/18/24  left hip CSI; initially helpful no longer beneficial     Surgery:  Date/Type/Location/%relief/ Lasting  - no hx of orthopedics surgeries     Plan     At this time, I would like to make the following recommendation/plan:  -  Physical Therapy: on going home exercise program   -  Medication: continue with lidocaine patches. Refill meloxicam and methocarbamol.   - Injections: may benefit from MBB/RFA of cervical spine.  -  Imaging:   -- CT of C spine from 04/02/24: multilevel spondylosis most severe at C5-C6 with foraminal stenosis R>L   - reviewed Dr. Patricio Galarza's note; plan for hip replacement late October.   - Referrals: new memory and worsening cognitive decline referral to neurology placed.   -  Patient would like to hold off on additional testing or treatment until after her hip replacement     Follow up:  Follow up in 3 months post surgery       Subjective    Chief Complaint: neck pain    HPI:  Carolina Martinez is a 74 y.o. female, retired, with pertinent PMH of DM2 (HbA1c: 5.5% (05/2025), obesity, EMERY, HTN, HLD who is following up today for neck pain (L>R), long covid. Pain first started on 2019, associated with covid infection.  She was last seen on 6/17/25. Plan at the time was meloxicam, methocarbamol, and home exercise program.  Since her last visit symptoms have worsened by. Today, pain location fluctuates  and is now b/l in the mastoid process to the shoulder , described as aching no longer radiating down the lateral arm, and left body to the anterior left thigh, 0/10 with lidocaine patches but 10/10 at worst.  Patient complaining of memory issues. She reports occasional numbness and tingling in left hand-- resolving on its own.    Since last visit:  - Physical Therapy: Last PT was on 03/2025 . Patient has completed 10 sessions with good benefit.   - Medication: Tried meloxicam, methocarbamol, mild side effects--with mild benefit. gabapentin 300mg nightly (can't tolerate increase); unclear benefit. Better with lidocaine patches.   - hasn't been using the wrist splint     ROS:  - Sleep: Sleep is occasionally disrupted secondary to pain  - Bowel/Bladder: Denies bowel/bladder dysfunction  - Falls: Denies fall  - Weight changes:  reports 55 lbs wet loss with ozempic  - Mood/Psych: occasional feelings of anxiety or depression    12-point review of systems was completed and is otherwise negative except as noted in the HPI.       Social Hx:  - Home: Lives with  in an apartment.   - ADLs: Independent in ADLs and ambulation without assistive device.   - Hobbies: tv, reading, walking  - Work:  retired [employer]  - Smoking/Alcohol/Drugs: No/occasionally/No    Objective     Physical Exam  General:  Appears state age, in NAD, and obese  Psychological:  Normal mood and affect  Pulm:  Breathing comfortably on RA    Gait:   - normal  - Without assistive device     Inspection:   - No erythema, swelling/edema, ecchymosis or deformity noted  - normal muscle bulk of bilateral upper extremity      Sensation:  - Intact to light touch in bilateral upper extremity     Palpation:  -  no tenderness to palpation of bilateral trapezius and rhombroids  -  tenderness to palpation of left cervical paraspinals     Range of Motion:  - Full Cervico-thoracic  flexion/extension (painful)/rotation(b/l painful L>R)/sidebending (b/l  painful (L>R)  - Full painless bilateral shoulders     Strength:  Side Shoulder Abduction  (C5) Elbow Flexion (C5) Elbow Extension (C7) Wrist Extension (C6) Finger Flexion (DIP)  (C8) Finger Abduction  (T1)   Right 5 5 5 5 5 5   Left 5 5 5 5 5 5     Special tests:  - Acute radiculopathy (Spurling test/ Bakody sign): neg b/l   - Cervical Myelopathy (Serna's test): neg b/l  - Carpal tunnel (Phalens, tinnels): pos at left wrist    Imaging and Other Studies:    CT CERVICAL SPINE WO IV CONTRAST; ;  4/2/2024 12:02 pm      INDICATION:  Signs/Symptoms:neck pain radiating down right arm.      COMPARISON:  None.      ACCESSION NUMBER(S):  [id number]      ORDERING CLINICIAN:  OSCAR LUNA      TECHNIQUE:  Contiguous axial CT sections are performed through the cervical spine  is supplemented with coronal and sagittal reformatted images.      FINDINGS:  There is straightening of the cervical lordosis and slight dextro  convexity of the cervical spine. There is minimal posterior  subluxation of C5 measuring 2 mm. The facet joints align normally.      The cervical vertebral body heights are maintained. The C1 ring is  intact. There is no evidence of acute cervical spine fracture. There  is no bone destruction or aggressive periosteal reaction. There is  small lobular lucency at the base of the dens anteriorly reaching 6  mm and lucency in the right C2 pedicle. There is no bony expansion.  These findings are unchanged from 10/06/2021 favoring benign etiology.      There is lower cervical spondylosis with disc space narrowing and  small marginal osteophytes from C5 through C7. There is mild cervical  spondylosis at C4-5 with disc space narrowing.      The C1-2 relationship is within normal limits.      The C2-3 disc space level demonstrates mild facet arthrosis. There is  no central canal or neural foraminal stenosis.      The C3-4 disc space level demonstrates mild facet arthrosis greater  on the right. There is mild narrowing  of the right neural foramen.  There is no central canal narrowing.      The C4-5 disc space level demonstrates moderate facet arthrosis on  the left and mild facet arthrosis on the right. There is mild bulging  disc and uncovertebral arthrosis with mild bilateral neural foraminal  narrowing, greater on the left.      The C5-6 disc space level demonstrates mild bilateral facet  arthrosis, greater on the left. There is bulging disc and marginal  osteophyte with uncovertebral arthrosis on the right. There is  moderate narrowing of the right neural foramen and mild narrowing of  the left neural foramen.      The C6-7 disc space level demonstrates mild facet arthrosis on the  right. There is bulging disc and marginal osteophyte with mild  uncovertebral arthrosis. There is mild narrowing of the right neural  foramen.      The C7-T1 disc space level demonstrates mild facet arthrosis on the  right. There is no central canal or neural foraminal stenosis.      The surrounding soft tissues are unremarkable. There is no  prevertebral soft tissue swelling or retropharyngeal air.      IMPRESSION:  Mild multilevel cervical spondylosis.      Straightening of cervical lordosis and mild dextro convexity of the  cervical spine.      No sign of acute fracture or subluxation. Mild multilevel bulging  disc with facet and uncovertebral arthrosis. There is mild to  moderate multilevel bilateral neural foraminal narrowing as detailed  above. There is no significant central canal stenosis.

## 2025-08-27 ENCOUNTER — TELEPHONE (OUTPATIENT)
Dept: PRIMARY CARE | Facility: CLINIC | Age: 75
End: 2025-08-27
Payer: MEDICARE

## 2025-09-04 ENCOUNTER — APPOINTMENT (OUTPATIENT)
Dept: PRIMARY CARE | Facility: CLINIC | Age: 75
End: 2025-09-04
Payer: MEDICARE

## 2025-09-04 PROBLEM — Z86.19 HISTORY OF HERPES GENITALIS: Status: RESOLVED | Noted: 2023-02-09 | Resolved: 2025-09-04

## 2025-09-04 ASSESSMENT — PAIN SCALES - GENERAL: PAINLEVEL_OUTOF10: 10-WORST PAIN EVER

## 2025-09-06 PROBLEM — E66.811 CLASS 1 OBESITY DUE TO EXCESS CALORIES WITH SERIOUS COMORBIDITY AND BODY MASS INDEX (BMI) OF 30.0 TO 30.9 IN ADULT: Status: RESOLVED | Noted: 2025-05-01 | Resolved: 2025-09-06

## 2025-09-06 PROBLEM — E66.09 CLASS 1 OBESITY DUE TO EXCESS CALORIES WITH SERIOUS COMORBIDITY AND BODY MASS INDEX (BMI) OF 30.0 TO 30.9 IN ADULT: Status: RESOLVED | Noted: 2025-05-01 | Resolved: 2025-09-06

## 2025-11-03 ENCOUNTER — APPOINTMENT (OUTPATIENT)
Dept: PRIMARY CARE | Facility: CLINIC | Age: 75
End: 2025-11-03
Payer: MEDICARE

## 2025-11-11 ENCOUNTER — APPOINTMENT (OUTPATIENT)
Dept: OTOLARYNGOLOGY | Facility: CLINIC | Age: 75
End: 2025-11-11
Payer: COMMERCIAL

## 2025-11-14 ENCOUNTER — APPOINTMENT (OUTPATIENT)
Dept: GASTROENTEROLOGY | Facility: CLINIC | Age: 75
End: 2025-11-14
Payer: MEDICARE

## 2025-12-04 ENCOUNTER — APPOINTMENT (OUTPATIENT)
Dept: PRIMARY CARE | Facility: CLINIC | Age: 75
End: 2025-12-04
Payer: MEDICARE

## 2025-12-16 ENCOUNTER — APPOINTMENT (OUTPATIENT)
Dept: ENDOCRINOLOGY | Facility: CLINIC | Age: 75
End: 2025-12-16
Payer: MEDICARE